# Patient Record
Sex: MALE | Race: WHITE | NOT HISPANIC OR LATINO
[De-identification: names, ages, dates, MRNs, and addresses within clinical notes are randomized per-mention and may not be internally consistent; named-entity substitution may affect disease eponyms.]

---

## 2022-08-10 PROBLEM — Z00.00 ENCOUNTER FOR PREVENTIVE HEALTH EXAMINATION: Status: ACTIVE | Noted: 2022-08-10

## 2022-08-11 ENCOUNTER — NON-APPOINTMENT (OUTPATIENT)
Age: 87
End: 2022-08-11

## 2022-08-29 ENCOUNTER — APPOINTMENT (OUTPATIENT)
Dept: CARDIOLOGY | Facility: CLINIC | Age: 87
End: 2022-08-29

## 2022-08-29 VITALS
WEIGHT: 163 LBS | OXYGEN SATURATION: 97 % | HEART RATE: 76 BPM | BODY MASS INDEX: 26.2 KG/M2 | SYSTOLIC BLOOD PRESSURE: 150 MMHG | DIASTOLIC BLOOD PRESSURE: 70 MMHG | HEIGHT: 66 IN

## 2022-08-29 DIAGNOSIS — R06.02 SHORTNESS OF BREATH: ICD-10-CM

## 2022-08-29 PROCEDURE — 93000 ELECTROCARDIOGRAM COMPLETE: CPT

## 2022-08-29 PROCEDURE — 99204 OFFICE O/P NEW MOD 45 MIN: CPT

## 2022-08-30 NOTE — DISCUSSION/SUMMARY
[EKG obtained to assist in diagnosis and management of assessed problem(s)] : EKG obtained to assist in diagnosis and management of assessed problem(s) [FreeTextEntry1] : EKG performed today revealed NSR, (+) RBBB. \par Elevated BP: BP evaluation today reveals a BP of 150/70 mmHg. At home, his blood pressures range anywhere from 140-150s/80-90s mmHg. Due to the patient's advanced age and mildly elevated BP, permissive BP of 140/50 is acceptable. Recommend salt reduction and dietary restriction. If BPs continue to be elevated, will consider a mild dose of an antihypertensive. \par Continue conservative therapy. \par Instructed to follow up in 6 months. \par Plan discussed with the patient.

## 2022-08-30 NOTE — HISTORY OF PRESENT ILLNESS
[FreeTextEntry1] : TOÑO HORNER is a 89-year-old male who presents today for new patient evaluation. The patient was noted to have elevated BP at his PCP's office and was referred for further evaluation. Denies chest pain or SOB. Otherwise: (-) visual symptoms, (-) focal weakness.\par \par

## 2022-08-31 PROBLEM — R06.02 SHORTNESS OF BREATH ON EXERTION: Status: ACTIVE | Noted: 2022-08-31

## 2022-09-01 ENCOUNTER — APPOINTMENT (OUTPATIENT)
Dept: CARDIOLOGY | Facility: CLINIC | Age: 87
End: 2022-09-01

## 2022-09-01 ENCOUNTER — RX RENEWAL (OUTPATIENT)
Age: 87
End: 2022-09-01

## 2022-09-01 PROCEDURE — 93306 TTE W/DOPPLER COMPLETE: CPT

## 2022-11-20 ENCOUNTER — EMERGENCY (EMERGENCY)
Facility: HOSPITAL | Age: 87
LOS: 0 days | Discharge: HOME | End: 2022-11-20
Attending: EMERGENCY MEDICINE | Admitting: EMERGENCY MEDICINE

## 2022-11-20 VITALS
SYSTOLIC BLOOD PRESSURE: 178 MMHG | WEIGHT: 164.91 LBS | TEMPERATURE: 96 F | DIASTOLIC BLOOD PRESSURE: 84 MMHG | OXYGEN SATURATION: 97 % | RESPIRATION RATE: 17 BRPM | HEART RATE: 77 BPM

## 2022-11-20 DIAGNOSIS — I10 ESSENTIAL (PRIMARY) HYPERTENSION: ICD-10-CM

## 2022-11-20 DIAGNOSIS — R31.9 HEMATURIA, UNSPECIFIED: ICD-10-CM

## 2022-11-20 DIAGNOSIS — K76.89 OTHER SPECIFIED DISEASES OF LIVER: ICD-10-CM

## 2022-11-20 LAB
ALBUMIN SERPL ELPH-MCNC: 4.1 G/DL — SIGNIFICANT CHANGE UP (ref 3.5–5.2)
ALP SERPL-CCNC: 62 U/L — SIGNIFICANT CHANGE UP (ref 30–115)
ALT FLD-CCNC: 11 U/L — SIGNIFICANT CHANGE UP (ref 0–41)
ANION GAP SERPL CALC-SCNC: 6 MMOL/L — LOW (ref 7–14)
APPEARANCE UR: CLEAR — SIGNIFICANT CHANGE UP
AST SERPL-CCNC: 20 U/L — SIGNIFICANT CHANGE UP (ref 0–41)
BACTERIA # UR AUTO: NEGATIVE — SIGNIFICANT CHANGE UP
BASOPHILS # BLD AUTO: 0.05 K/UL — SIGNIFICANT CHANGE UP (ref 0–0.2)
BASOPHILS NFR BLD AUTO: 0.7 % — SIGNIFICANT CHANGE UP (ref 0–1)
BILIRUB SERPL-MCNC: 0.7 MG/DL — SIGNIFICANT CHANGE UP (ref 0.2–1.2)
BILIRUB UR-MCNC: NEGATIVE — SIGNIFICANT CHANGE UP
BUN SERPL-MCNC: 14 MG/DL — SIGNIFICANT CHANGE UP (ref 10–20)
CALCIUM SERPL-MCNC: 8.4 MG/DL — SIGNIFICANT CHANGE UP (ref 8.4–10.5)
CHLORIDE SERPL-SCNC: 108 MMOL/L — SIGNIFICANT CHANGE UP (ref 98–110)
CO2 SERPL-SCNC: 26 MMOL/L — SIGNIFICANT CHANGE UP (ref 17–32)
COLOR SPEC: SIGNIFICANT CHANGE UP
CREAT SERPL-MCNC: 1 MG/DL — SIGNIFICANT CHANGE UP (ref 0.7–1.5)
DIFF PNL FLD: ABNORMAL
EGFR: 72 ML/MIN/1.73M2 — SIGNIFICANT CHANGE UP
EOSINOPHIL # BLD AUTO: 0.14 K/UL — SIGNIFICANT CHANGE UP (ref 0–0.7)
EOSINOPHIL NFR BLD AUTO: 2 % — SIGNIFICANT CHANGE UP (ref 0–8)
EPI CELLS # UR: 0 /HPF — SIGNIFICANT CHANGE UP (ref 0–5)
GLUCOSE SERPL-MCNC: 88 MG/DL — SIGNIFICANT CHANGE UP (ref 70–99)
GLUCOSE UR QL: NEGATIVE — SIGNIFICANT CHANGE UP
HCT VFR BLD CALC: 41.6 % — LOW (ref 42–52)
HGB BLD-MCNC: 13.9 G/DL — LOW (ref 14–18)
HYALINE CASTS # UR AUTO: 0 /LPF — SIGNIFICANT CHANGE UP (ref 0–7)
IMM GRANULOCYTES NFR BLD AUTO: 0.4 % — HIGH (ref 0.1–0.3)
KETONES UR-MCNC: NEGATIVE — SIGNIFICANT CHANGE UP
LEUKOCYTE ESTERASE UR-ACNC: NEGATIVE — SIGNIFICANT CHANGE UP
LYMPHOCYTES # BLD AUTO: 2.23 K/UL — SIGNIFICANT CHANGE UP (ref 1.2–3.4)
LYMPHOCYTES # BLD AUTO: 31.8 % — SIGNIFICANT CHANGE UP (ref 20.5–51.1)
MCHC RBC-ENTMCNC: 31.5 PG — HIGH (ref 27–31)
MCHC RBC-ENTMCNC: 33.4 G/DL — SIGNIFICANT CHANGE UP (ref 32–37)
MCV RBC AUTO: 94.3 FL — HIGH (ref 80–94)
MONOCYTES # BLD AUTO: 0.64 K/UL — HIGH (ref 0.1–0.6)
MONOCYTES NFR BLD AUTO: 9.1 % — SIGNIFICANT CHANGE UP (ref 1.7–9.3)
NEUTROPHILS # BLD AUTO: 3.93 K/UL — SIGNIFICANT CHANGE UP (ref 1.4–6.5)
NEUTROPHILS NFR BLD AUTO: 56 % — SIGNIFICANT CHANGE UP (ref 42.2–75.2)
NITRITE UR-MCNC: NEGATIVE — SIGNIFICANT CHANGE UP
NRBC # BLD: 0 /100 WBCS — SIGNIFICANT CHANGE UP (ref 0–0)
PH UR: 6.5 — SIGNIFICANT CHANGE UP (ref 5–8)
PLATELET # BLD AUTO: 194 K/UL — SIGNIFICANT CHANGE UP (ref 130–400)
POTASSIUM SERPL-MCNC: 4.1 MMOL/L — SIGNIFICANT CHANGE UP (ref 3.5–5)
POTASSIUM SERPL-SCNC: 4.1 MMOL/L — SIGNIFICANT CHANGE UP (ref 3.5–5)
PROT SERPL-MCNC: 6.7 G/DL — SIGNIFICANT CHANGE UP (ref 6–8)
PROT UR-MCNC: SIGNIFICANT CHANGE UP
RBC # BLD: 4.41 M/UL — LOW (ref 4.7–6.1)
RBC # FLD: 13.2 % — SIGNIFICANT CHANGE UP (ref 11.5–14.5)
RBC CASTS # UR COMP ASSIST: 63 /HPF — HIGH (ref 0–4)
SODIUM SERPL-SCNC: 140 MMOL/L — SIGNIFICANT CHANGE UP (ref 135–146)
SP GR SPEC: 1.01 — SIGNIFICANT CHANGE UP (ref 1.01–1.03)
UROBILINOGEN FLD QL: SIGNIFICANT CHANGE UP
WBC # BLD: 7.02 K/UL — SIGNIFICANT CHANGE UP (ref 4.8–10.8)
WBC # FLD AUTO: 7.02 K/UL — SIGNIFICANT CHANGE UP (ref 4.8–10.8)
WBC UR QL: 1 /HPF — SIGNIFICANT CHANGE UP (ref 0–5)

## 2022-11-20 PROCEDURE — 99284 EMERGENCY DEPT VISIT MOD MDM: CPT | Mod: GC

## 2022-11-20 PROCEDURE — 74176 CT ABD & PELVIS W/O CONTRAST: CPT | Mod: 26,MA

## 2022-11-20 RX ORDER — SODIUM CHLORIDE 9 MG/ML
1000 INJECTION INTRAMUSCULAR; INTRAVENOUS; SUBCUTANEOUS ONCE
Refills: 0 | Status: COMPLETED | OUTPATIENT
Start: 2022-11-20 | End: 2022-11-20

## 2022-11-20 RX ADMIN — SODIUM CHLORIDE 1000 MILLILITER(S): 9 INJECTION INTRAMUSCULAR; INTRAVENOUS; SUBCUTANEOUS at 11:41

## 2022-11-20 NOTE — ED PROVIDER NOTE - OBJECTIVE STATEMENT
89 year old male with hypertension presenting with complaints of blood in his urine. Patient reports that he had blood in his urine three times, progressively getting better, since Friday. Denies any pain, fever, changes in urination frequency, or any other symptoms. Son notes that his father was moving heavy furniture before the first episode occurred.

## 2022-11-20 NOTE — ED ADULT NURSE NOTE - NSIMPLEMENTINTERV_GEN_ALL_ED
Implemented All Fall with Harm Risk Interventions:  [place] to call system. Call bell, personal items and telephone within reach. Instruct patient to call for assistance. Room bathroom lighting operational. Non-slip footwear when patient is off stretcher. Physically safe environment: no spills, clutter or unnecessary equipment. Stretcher in lowest position, wheels locked, appropriate side rails in place. Provide visual cue, wrist band, yellow gown, etc. Monitor gait and stability. Monitor for mental status changes and reorient to person, place, and time. Review medications for side effects contributing to fall risk. Reinforce activity limits and safety measures with patient and family. Provide visual clues: red socks.

## 2022-11-20 NOTE — ED PROVIDER NOTE - PHYSICAL EXAMINATION
Gen: Alert, NAD, well appearing  Head: NC, AT  Pulm: Bilateral BS, normal resp effort, no wheeze/stridor/retractions  CV: RRR, no M/R/G, +dist pulses  Abd: soft, NT/ND, +BS, no hepatosplenomegaly  Mskel: no edema/erythema/cyanosis  Skin: no rash, warm/dry  Neuro: AAOx3, no sensory/motor deficits, CN 2-12 intact

## 2022-11-20 NOTE — ED PROVIDER NOTE - NSFOLLOWUPINSTRUCTIONS_ED_ALL_ED_FT
Please follow-up with PCP in 1 to 3 days for hypodense liver mass. Return precautions explained in full to patient/family.                                                                                                 Hematuria, Adult       Hematuria is blood in the urine. Blood may be visible in the urine, or it may be identified with a test. This condition can be caused by infections of the bladder, urethra, kidney, or prostate. Other possible causes include:  •Kidney stones.      •Cancer of the urinary tract.      •Too much calcium in the urine.      •Conditions that are passed from parent to child (inherited conditions).       •Exercise that requires a lot of energy.      Infections can usually be treated with medicine, and a kidney stone usually will pass through your urine. If neither of these is the cause of your hematuria, more tests may be needed to identify the cause of your symptoms.    It is very important to tell your health care provider about any blood in your urine, even if it is painless or the blood stops without treatment. Blood in the urine, when it happens and then stops and then happens again, can be a symptom of a very serious condition, including cancer. There is no pain in the initial stages of many urinary cancers.      Follow these instructions at home:    Medicines     •Take over-the-counter and prescription medicines only as told by your health care provider.      •If you were prescribed an antibiotic medicine, take it as told by your health care provider. Do not stop taking the antibiotic even if you start to feel better.      Eating and drinking     •Drink enough fluid to keep your urine pale yellow. It is recommended that you drink 3–4 quarts (2.8–3.8 L) a day. If you have been diagnosed with an infection, drinking cranberry juice in addition to large amounts of water is recommended.      •Avoid caffeine, tea, and carbonated beverages. These tend to irritate the bladder.      •Avoid alcohol because it may irritate the prostate (in males).      General instructions     •If you have been diagnosed with a kidney stone, follow your health care provider's instructions about straining your urine to catch the stone.      •Empty your bladder often. Avoid holding urine for long periods of time.    •If you are female:  •After a bowel movement, wipe from front to back and use each piece of toilet paper only once.      •Empty your bladder before and after sex.        •Pay attention to any changes in your symptoms. Tell your health care provider about any changes or any new symptoms.      •It is up to you to get the results of any tests. Ask your health care provider, or the department that is doing the test, when your results will be ready.      •Keep all follow-up visits. This is important.        Contact a health care provider if:    •You develop back pain.      •You have a fever or chills.      •You have nausea or vomiting.      •Your symptoms do not improve after 3 days.      •Your symptoms get worse.        Get help right away if:    •You develop severe vomiting and are unable to take medicine without vomiting.      •You develop severe pain in your back or abdomen even though you are taking medicine.      •You pass a large amount of blood in your urine.      •You pass blood clots in your urine.      •You feel very weak or like you might faint.      •You faint.        Summary    •Hematuria is blood in the urine. It has many possible causes.      •It is very important that you tell your health care provider about any blood in your urine, even if it is painless or the blood stops without treatment.      •Take over-the-counter and prescription medicines only as told by your health care provider.      •Drink enough fluid to keep your urine pale yellow.      This information is not intended to replace advice given to you by your health care provider. Make sure you discuss any questions you have with your health care provider.      Document Revised: 08/18/2021 Document Reviewed: 08/18/2021    Elsevier Patient Education © 2022 [employer]

## 2022-11-20 NOTE — ED PROVIDER NOTE - PATIENT PORTAL LINK FT
You can access the FollowMyHealth Patient Portal offered by [facility] by registering at the following website: [email or web address]. By joining [employer]’s FollowMyHealth portal, you will also be able to view your health information using other applications (apps) compatible with our system.

## 2022-11-20 NOTE — ED PROVIDER NOTE - CARE PROVIDER_API CALL
Subhash Zamora)  Urology  [address] [place] [address], [address]  [place] [address]  Phone: [phone]  Fax: [phone]  Follow Up Time: 1-3 Days

## 2022-11-20 NOTE — ED PROVIDER NOTE - PROGRESS NOTE DETAILS
SG: due to pt age and unclear etiology; basic labs, UA, Ucx, Imaging. Will continue to monitor and reassess.

## 2022-11-20 NOTE — ED PROVIDER NOTE - NS ED ROS FT
Constitutional:  No fever, chills, lethargy, or abnormal weight loss  Eyes:  No eye pain or visual changes  ENMT: No nasal discharge, no toothache, no sore throat. No neck pain or stiffness  Cardiac:  No chest pain or palpitations  Respiratory:  No cough or respiratory distress.   GI:  No nausea, vomiting, diarrhea or abdominal pain.  :  see HPI  MS:  No back or joint pain.  Neuro:  No headache. No numbness, weakness, or tingling.   Skin:  No skin rash  Except as documented in the HPI,  all other systems are negative

## 2022-11-20 NOTE — ED PROVIDER NOTE - ATTENDING CONTRIBUTION TO CARE
89-year-old male past medical history hypertension presents with 2 days of hematuria.  Has had intermittent episodes with small clots over the last 2 days.  No dysuria or frequency.  No abdominal pain or flank pain.  No fever nausea vomiting diarrhea.  No chest pain shortness of breath.    On exam, AFVSS, Well appearing, No acute distress, NCAT, EOMI, PERRLA, MMM, Neck supple, LCTAB, RRR nl s1s2 No mrg, Abdomen Soft NTND, no CVA tenderness, AAOx3, No Focal Deficits, No LE edema or calf TTP,    A/P; hematuria, painless, UA negative for UTI, hemoglobin stable, creatinine within normal limits, no acute surgical pathology on CT abdomen pelvis, will DC home with urology rapid follow-up as outpatient within 1 week, strict return precautions provided.  Patient informed of large renal cysts and liver lesion.

## 2022-11-21 LAB
CULTURE RESULTS: SIGNIFICANT CHANGE UP
SPECIMEN SOURCE: SIGNIFICANT CHANGE UP

## 2022-12-05 ENCOUNTER — APPOINTMENT (OUTPATIENT)
Dept: UROLOGY | Facility: CLINIC | Age: 87
End: 2022-12-05

## 2022-12-05 ENCOUNTER — LABORATORY RESULT (OUTPATIENT)
Age: 87
End: 2022-12-05

## 2022-12-05 VITALS
SYSTOLIC BLOOD PRESSURE: 172 MMHG | TEMPERATURE: 97.3 F | WEIGHT: 168 LBS | HEIGHT: 70 IN | DIASTOLIC BLOOD PRESSURE: 83 MMHG | BODY MASS INDEX: 24.05 KG/M2 | HEART RATE: 64 BPM | RESPIRATION RATE: 18 BRPM | OXYGEN SATURATION: 98 %

## 2022-12-05 DIAGNOSIS — R31.0 GROSS HEMATURIA: ICD-10-CM

## 2022-12-05 DIAGNOSIS — Z86.79 PERSONAL HISTORY OF OTHER DISEASES OF THE CIRCULATORY SYSTEM: ICD-10-CM

## 2022-12-05 DIAGNOSIS — N28.1 CYST OF KIDNEY, ACQUIRED: ICD-10-CM

## 2022-12-05 DIAGNOSIS — Z87.891 PERSONAL HISTORY OF NICOTINE DEPENDENCE: ICD-10-CM

## 2022-12-05 PROCEDURE — 99204 OFFICE O/P NEW MOD 45 MIN: CPT

## 2022-12-06 PROBLEM — R31.0 GROSS HEMATURIA: Status: ACTIVE | Noted: 2022-12-05

## 2022-12-06 PROBLEM — N28.1 SIMPLE CYST OF KIDNEY: Status: ACTIVE | Noted: 2022-12-06

## 2022-12-06 NOTE — LETTER BODY
[Dear  ___] : Dear  [unfilled], [Consult Letter:] : I had the pleasure of evaluating your patient, [unfilled]. [Please see my note below.] : Please see my note below. [Sincerely,] : Sincerely, [FreeTextEntry3] : Seema Amos MD, FACS\par

## 2022-12-06 NOTE — ASSESSMENT
[FreeTextEntry1] : 89-year-old with painless gross hematuria.\par \par CT scan was reviewed with patient and family members.  They are aware of large renal cysts, kidney stone, and hepatic lesion.\par Patient will follow-up with medical doctor for hepatic lesion.\par \par Patient is currently asymptomatic and denies any gross hematuria.\par \par Discussed possible causes of gross hematuria with patient and family.\par \par Plan\par -Follow-up for in office cystoscopy\par -UA U culture and U cytology

## 2022-12-06 NOTE — ADDENDUM
[FreeTextEntry1] : Documented by CHERRY Romo acting as a scribe for Dr. Seema Amos \par \par All medical record entries made by the Scribe were at my, Dr. Amos direction and\par personally dictated by me.  I have reviewed the chart and agree that the record\par accurately reflects my personal performance of the history, physical exam, procedure and imaging.  \par  \par \par

## 2022-12-06 NOTE — HISTORY OF PRESENT ILLNESS
[FreeTextEntry1] : Terrell is a 89-year-old male presents for initial consultation for painless gross hematuria.  He was seen in the emergency department on November 20, 2022.\par \par In emergency room patient had CT abdomen and pelvis which revealed no hydroureteronephrosis or CT evidence of obstructing renal calculus.  Patient did have 4 mm nonobstructing right renal calculus.  He had a large bilateral renal cyst measuring up to 15.5 cm at the right upper pole.  Patient also had ill-defined indeterminate right hepatic lobe hypodensity measuring up to at least 1.4 cm.  Distal right ureter and UVJ cannot be evaluated due to obscuring streak artifact secondary to right hip hardware.\par \par Urinalysis done November 20, 2022 was negative for nitrites and negative for leukocytes.  Large blood.  63 RBCs.  1 WBC.  Urine culture grew greater than 3 organisms possible contamination.\par \par CMP done November 20, 2022 revealed an EGFR 72 and a creatinine of 1.0\par \par Currently patient reports feeling well.  He reports gross hematuria has resolved.  He denies any dysuria.  Denies pain.\par \par Patient son reports that patient did have a cystoscopy years ago.  Unknown urologist and unknown indication.

## 2022-12-07 LAB
APPEARANCE: CLEAR
BILIRUBIN URINE: NEGATIVE
BLOOD URINE: NEGATIVE
COLOR: YELLOW
GLUCOSE QUALITATIVE U: NEGATIVE
KETONES URINE: NEGATIVE
LEUKOCYTE ESTERASE URINE: NEGATIVE
NITRITE URINE: NEGATIVE
PH URINE: 6
PROTEIN URINE: ABNORMAL
SPECIFIC GRAVITY URINE: 1.03
UROBILINOGEN URINE: NORMAL

## 2022-12-08 LAB — BACTERIA UR CULT: NORMAL

## 2022-12-12 LAB — URINE CYTOLOGY: NORMAL

## 2023-01-12 ENCOUNTER — APPOINTMENT (OUTPATIENT)
Dept: UROLOGY | Facility: CLINIC | Age: 88
End: 2023-01-12

## 2023-02-24 ENCOUNTER — RX RENEWAL (OUTPATIENT)
Age: 88
End: 2023-02-24

## 2023-02-27 ENCOUNTER — APPOINTMENT (OUTPATIENT)
Dept: CARDIOLOGY | Facility: CLINIC | Age: 88
End: 2023-02-27
Payer: MEDICARE

## 2023-02-27 VITALS
WEIGHT: 167 LBS | OXYGEN SATURATION: 96 % | HEIGHT: 66 IN | RESPIRATION RATE: 16 BRPM | DIASTOLIC BLOOD PRESSURE: 76 MMHG | SYSTOLIC BLOOD PRESSURE: 155 MMHG | HEART RATE: 60 BPM | BODY MASS INDEX: 26.84 KG/M2

## 2023-02-27 PROCEDURE — 99213 OFFICE O/P EST LOW 20 MIN: CPT

## 2023-02-28 NOTE — DISCUSSION/SUMMARY
[FreeTextEntry1] : HTN: The impression is hypertension. Due to advanced age, recommend conservative management. There are no changes in medication management. Continue Coreg 3.125 mg BID. Other planned treatments include an exercise regimen, weight loss, low sodium diet, and alcohol moderation.\par \par Plan was discussed with the patient.\par \par

## 2023-02-28 NOTE — HISTORY OF PRESENT ILLNESS
[FreeTextEntry1] : TOÑO HORNER is a 89-year-old male, with a PMHx significant for HTN, who presents today for a follow-up visit. BP today measures 155/76 mmHg. Patient is currently on Coreg 3.125 mg BID. Denies any other complaints. States he feels well. EF was 45-50% on last echocardiogram performed in 9/2022. Otherwise: (-) chest pain, (-) SOB.\par

## 2023-08-28 ENCOUNTER — APPOINTMENT (OUTPATIENT)
Dept: CARDIOLOGY | Facility: CLINIC | Age: 88
End: 2023-08-28

## 2023-12-11 ENCOUNTER — APPOINTMENT (OUTPATIENT)
Dept: CARDIOLOGY | Facility: CLINIC | Age: 88
End: 2023-12-11
Payer: MEDICARE

## 2023-12-11 VITALS
RESPIRATION RATE: 16 BRPM | SYSTOLIC BLOOD PRESSURE: 136 MMHG | HEART RATE: 60 BPM | OXYGEN SATURATION: 97 % | BODY MASS INDEX: 27.32 KG/M2 | DIASTOLIC BLOOD PRESSURE: 80 MMHG | HEIGHT: 66 IN | WEIGHT: 170 LBS

## 2023-12-11 PROCEDURE — 99214 OFFICE O/P EST MOD 30 MIN: CPT

## 2023-12-11 PROCEDURE — 93000 ELECTROCARDIOGRAM COMPLETE: CPT

## 2024-02-21 ENCOUNTER — RX RENEWAL (OUTPATIENT)
Age: 89
End: 2024-02-21

## 2024-02-26 RX ORDER — CARVEDILOL 3.12 MG/1
3.12 TABLET, FILM COATED ORAL
Qty: 180 | Refills: 3 | Status: ACTIVE | COMMUNITY
Start: 2022-09-01 | End: 1900-01-01

## 2024-06-10 ENCOUNTER — APPOINTMENT (OUTPATIENT)
Dept: CARDIOLOGY | Facility: CLINIC | Age: 89
End: 2024-06-10
Payer: MEDICARE

## 2024-06-10 VITALS
HEIGHT: 66 IN | OXYGEN SATURATION: 97 % | DIASTOLIC BLOOD PRESSURE: 80 MMHG | SYSTOLIC BLOOD PRESSURE: 140 MMHG | HEART RATE: 72 BPM | BODY MASS INDEX: 27.16 KG/M2 | WEIGHT: 169 LBS

## 2024-06-10 DIAGNOSIS — I10 ESSENTIAL (PRIMARY) HYPERTENSION: ICD-10-CM

## 2024-06-10 DIAGNOSIS — E66.3 OVERWEIGHT: ICD-10-CM

## 2024-06-10 DIAGNOSIS — I50.9 HEART FAILURE, UNSPECIFIED: ICD-10-CM

## 2024-06-10 PROCEDURE — 99213 OFFICE O/P EST LOW 20 MIN: CPT

## 2024-06-10 PROCEDURE — 93000 ELECTROCARDIOGRAM COMPLETE: CPT

## 2024-06-13 NOTE — REVIEW OF SYSTEMS
[Negative] : Heme/Lymph [Numbness (Hypoesthesia)] : no numbness [Tingling (Paresthesia)] : no tingling [Weakness] : no weakness [Speech Disturbance] : no speech disturbance [FreeTextEntry2] : (+) Dizziness

## 2024-06-13 NOTE — CARDIOLOGY SUMMARY
[de-identified] : 12/11/2023: SR, (+) PVC.  ======================================================== [de-identified] : TTE - 09/01/2022: SUMMARY 1. Left ventricular calculated ejection fraction is estimated at 45 to 50%. 2. Normal right ventricular size and systolic function. 3. The left atrium is normal in size. 4. The right atrium is normal in size. 5. No evidence of a patent foramen ovale by color flow Doppler. 6. Interatrial septum is aneurysmal. 7. Mild aortic regurgitation. 8. Normal mitral valve structure and function. No mitral stenosis or significant regurgitation. =======================================================

## 2024-06-13 NOTE — DISCUSSION/SUMMARY
[EKG obtained to assist in diagnosis and management of assessed problem(s)] : EKG obtained to assist in diagnosis and management of assessed problem(s) [FreeTextEntry1] : EKG performed today revealed SR, (+) PVC.  HTN: The impression is hypertension. Currently, the condition is controlled. There are no changes in medication management. Continue current medical therapy. Other planned treatments include an exercise regimen, weight loss, low sodium diet, and alcohol moderation.  Overweight: Discussed risks of being overweight with the patient. Counselled on weight loss with dietary modification and increased physical activity/exercise.  Instructed to follow up in 6 months.  Plan was discussed with the patient.

## 2024-06-13 NOTE — HISTORY OF PRESENT ILLNESS
[FreeTextEntry1] : TOÑO HORNER is a 89-year-old male, with a PMHx significant for HTN, who presents today for a follow-up visit. Denies any new complaints. Reports he is feeling well. Otherwise: (-) chest pain, (-) SOB.

## 2024-06-28 ENCOUNTER — APPOINTMENT (OUTPATIENT)
Dept: ORTHOPEDIC SURGERY | Facility: CLINIC | Age: 89
End: 2024-06-28
Payer: MEDICARE

## 2024-06-28 DIAGNOSIS — M17.0 BILATERAL PRIMARY OSTEOARTHRITIS OF KNEE: ICD-10-CM

## 2024-06-28 PROCEDURE — 99203 OFFICE O/P NEW LOW 30 MIN: CPT

## 2024-06-28 PROCEDURE — 73562 X-RAY EXAM OF KNEE 3: CPT | Mod: 50

## 2024-11-20 NOTE — ED ADULT NURSE NOTE - NSFALLRSKHARMRISK_ED_ALL_ED
"Please see \"Imaging\" tab under \"Chart Review\" for details of today's visit.    Darryl Navarrete      " Normal vision: sees adequately in most situations; can see medication labels, newsprint yes

## 2024-12-09 ENCOUNTER — APPOINTMENT (OUTPATIENT)
Dept: CARDIOLOGY | Facility: CLINIC | Age: 88
End: 2024-12-09
Payer: MEDICARE

## 2024-12-09 ENCOUNTER — NON-APPOINTMENT (OUTPATIENT)
Age: 88
End: 2024-12-09

## 2024-12-09 VITALS
HEART RATE: 82 BPM | WEIGHT: 165 LBS | BODY MASS INDEX: 26.52 KG/M2 | DIASTOLIC BLOOD PRESSURE: 90 MMHG | OXYGEN SATURATION: 97 % | SYSTOLIC BLOOD PRESSURE: 160 MMHG | HEIGHT: 66 IN

## 2024-12-09 DIAGNOSIS — E66.3 OVERWEIGHT: ICD-10-CM

## 2024-12-09 DIAGNOSIS — I10 ESSENTIAL (PRIMARY) HYPERTENSION: ICD-10-CM

## 2024-12-09 DIAGNOSIS — I50.9 HEART FAILURE, UNSPECIFIED: ICD-10-CM

## 2024-12-09 PROCEDURE — 99214 OFFICE O/P EST MOD 30 MIN: CPT

## 2024-12-09 RX ORDER — NIFEDIPINE 30 MG/1
30 TABLET, EXTENDED RELEASE ORAL
Qty: 90 | Refills: 0 | Status: ACTIVE | COMMUNITY
Start: 2024-12-09 | End: 1900-01-01

## 2025-01-01 ENCOUNTER — INPATIENT (INPATIENT)
Facility: HOSPITAL | Age: 89
LOS: 23 days | DRG: 948 | End: 2025-05-12
Attending: STUDENT IN AN ORGANIZED HEALTH CARE EDUCATION/TRAINING PROGRAM | Admitting: INTERNAL MEDICINE
Payer: MEDICARE

## 2025-01-01 VITALS
OXYGEN SATURATION: 96 % | TEMPERATURE: 98 F | SYSTOLIC BLOOD PRESSURE: 125 MMHG | HEART RATE: 101 BPM | RESPIRATION RATE: 18 BRPM | DIASTOLIC BLOOD PRESSURE: 73 MMHG

## 2025-01-01 VITALS — RESPIRATION RATE: 22 BRPM | OXYGEN SATURATION: 93 %

## 2025-01-01 DIAGNOSIS — R31.9 HEMATURIA, UNSPECIFIED: ICD-10-CM

## 2025-01-01 DIAGNOSIS — R41.0 DISORIENTATION, UNSPECIFIED: ICD-10-CM

## 2025-01-01 DIAGNOSIS — I50.9 HEART FAILURE, UNSPECIFIED: ICD-10-CM

## 2025-01-01 DIAGNOSIS — Z71.89 OTHER SPECIFIED COUNSELING: ICD-10-CM

## 2025-01-01 DIAGNOSIS — I26.99 OTHER PULMONARY EMBOLISM WITHOUT ACUTE COR PULMONALE: ICD-10-CM

## 2025-01-01 DIAGNOSIS — Z51.5 ENCOUNTER FOR PALLIATIVE CARE: ICD-10-CM

## 2025-01-01 DIAGNOSIS — J96.01 ACUTE RESPIRATORY FAILURE WITH HYPOXIA: ICD-10-CM

## 2025-01-01 LAB
ALBUMIN SERPL ELPH-MCNC: 3 G/DL — LOW (ref 3.5–5.2)
ALBUMIN SERPL ELPH-MCNC: 3.2 G/DL — LOW (ref 3.5–5.2)
ALBUMIN SERPL ELPH-MCNC: 3.3 G/DL — LOW (ref 3.5–5.2)
ALBUMIN SERPL ELPH-MCNC: 3.4 G/DL — LOW (ref 3.5–5.2)
ALBUMIN SERPL ELPH-MCNC: 3.5 G/DL — SIGNIFICANT CHANGE UP (ref 3.5–5.2)
ALBUMIN SERPL ELPH-MCNC: 3.6 G/DL — SIGNIFICANT CHANGE UP (ref 3.5–5.2)
ALBUMIN SERPL ELPH-MCNC: 3.6 G/DL — SIGNIFICANT CHANGE UP (ref 3.5–5.2)
ALBUMIN SERPL ELPH-MCNC: 3.7 G/DL — SIGNIFICANT CHANGE UP (ref 3.5–5.2)
ALBUMIN SERPL ELPH-MCNC: 3.8 G/DL — SIGNIFICANT CHANGE UP (ref 3.5–5.2)
ALP SERPL-CCNC: 75 U/L — SIGNIFICANT CHANGE UP (ref 30–115)
ALP SERPL-CCNC: 77 U/L — SIGNIFICANT CHANGE UP (ref 30–115)
ALP SERPL-CCNC: 79 U/L — SIGNIFICANT CHANGE UP (ref 30–115)
ALP SERPL-CCNC: 80 U/L — SIGNIFICANT CHANGE UP (ref 30–115)
ALP SERPL-CCNC: 84 U/L — SIGNIFICANT CHANGE UP (ref 30–115)
ALP SERPL-CCNC: 84 U/L — SIGNIFICANT CHANGE UP (ref 30–115)
ALP SERPL-CCNC: 85 U/L — SIGNIFICANT CHANGE UP (ref 30–115)
ALP SERPL-CCNC: 86 U/L — SIGNIFICANT CHANGE UP (ref 30–115)
ALP SERPL-CCNC: 92 U/L — SIGNIFICANT CHANGE UP (ref 30–115)
ALP SERPL-CCNC: 93 U/L — SIGNIFICANT CHANGE UP (ref 30–115)
ALP SERPL-CCNC: 93 U/L — SIGNIFICANT CHANGE UP (ref 30–115)
ALP SERPL-CCNC: 99 U/L — SIGNIFICANT CHANGE UP (ref 30–115)
ALT FLD-CCNC: 10 U/L — SIGNIFICANT CHANGE UP (ref 0–41)
ALT FLD-CCNC: 13 U/L — SIGNIFICANT CHANGE UP (ref 0–41)
ALT FLD-CCNC: 15 U/L — SIGNIFICANT CHANGE UP (ref 0–41)
ALT FLD-CCNC: 16 U/L — SIGNIFICANT CHANGE UP (ref 0–41)
ALT FLD-CCNC: 17 U/L — SIGNIFICANT CHANGE UP (ref 0–41)
ALT FLD-CCNC: 17 U/L — SIGNIFICANT CHANGE UP (ref 0–41)
ALT FLD-CCNC: 24 U/L — SIGNIFICANT CHANGE UP (ref 0–41)
ALT FLD-CCNC: 25 U/L — SIGNIFICANT CHANGE UP (ref 0–41)
ALT FLD-CCNC: 30 U/L — SIGNIFICANT CHANGE UP (ref 0–41)
ALT FLD-CCNC: 34 U/L — SIGNIFICANT CHANGE UP (ref 0–41)
ALT FLD-CCNC: 39 U/L — SIGNIFICANT CHANGE UP (ref 0–41)
ALT FLD-CCNC: 44 U/L — HIGH (ref 0–41)
ALT FLD-CCNC: 48 U/L — HIGH (ref 0–41)
ALT FLD-CCNC: 51 U/L — HIGH (ref 0–41)
ALT FLD-CCNC: 61 U/L — HIGH (ref 0–41)
ALT FLD-CCNC: 63 U/L — HIGH (ref 0–41)
ANION GAP SERPL CALC-SCNC: 11 MMOL/L — SIGNIFICANT CHANGE UP (ref 7–14)
ANION GAP SERPL CALC-SCNC: 11 MMOL/L — SIGNIFICANT CHANGE UP (ref 7–14)
ANION GAP SERPL CALC-SCNC: 12 MMOL/L — SIGNIFICANT CHANGE UP (ref 7–14)
ANION GAP SERPL CALC-SCNC: 13 MMOL/L — SIGNIFICANT CHANGE UP (ref 7–14)
ANION GAP SERPL CALC-SCNC: 14 MMOL/L — SIGNIFICANT CHANGE UP (ref 7–14)
ANION GAP SERPL CALC-SCNC: 15 MMOL/L — HIGH (ref 7–14)
ANION GAP SERPL CALC-SCNC: 16 MMOL/L — HIGH (ref 7–14)
ANION GAP SERPL CALC-SCNC: 16 MMOL/L — HIGH (ref 7–14)
ANION GAP SERPL CALC-SCNC: 17 MMOL/L — HIGH (ref 7–14)
ANION GAP SERPL CALC-SCNC: 8 MMOL/L — SIGNIFICANT CHANGE UP (ref 7–14)
APPEARANCE UR: ABNORMAL
APTT BLD: 26.7 SEC — LOW (ref 27–39.2)
AST SERPL-CCNC: 18 U/L — SIGNIFICANT CHANGE UP (ref 0–41)
AST SERPL-CCNC: 23 U/L — SIGNIFICANT CHANGE UP (ref 0–41)
AST SERPL-CCNC: 24 U/L — SIGNIFICANT CHANGE UP (ref 0–41)
AST SERPL-CCNC: 26 U/L — SIGNIFICANT CHANGE UP (ref 0–41)
AST SERPL-CCNC: 26 U/L — SIGNIFICANT CHANGE UP (ref 0–41)
AST SERPL-CCNC: 28 U/L — SIGNIFICANT CHANGE UP (ref 0–41)
AST SERPL-CCNC: 31 U/L — SIGNIFICANT CHANGE UP (ref 0–41)
AST SERPL-CCNC: 37 U/L — SIGNIFICANT CHANGE UP (ref 0–41)
AST SERPL-CCNC: 40 U/L — SIGNIFICANT CHANGE UP (ref 0–41)
AST SERPL-CCNC: 41 U/L — SIGNIFICANT CHANGE UP (ref 0–41)
AST SERPL-CCNC: 43 U/L — HIGH (ref 0–41)
AST SERPL-CCNC: 45 U/L — HIGH (ref 0–41)
AST SERPL-CCNC: 50 U/L — HIGH (ref 0–41)
AST SERPL-CCNC: 67 U/L — HIGH (ref 0–41)
AST SERPL-CCNC: 79 U/L — HIGH (ref 0–41)
AST SERPL-CCNC: 85 U/L — HIGH (ref 0–41)
BASE EXCESS BLDA CALC-SCNC: -0.3 MMOL/L — SIGNIFICANT CHANGE UP (ref -2–3)
BASE EXCESS BLDA CALC-SCNC: 0.3 MMOL/L — SIGNIFICANT CHANGE UP (ref -2–3)
BASE EXCESS BLDA CALC-SCNC: 2.4 MMOL/L — SIGNIFICANT CHANGE UP (ref -2–3)
BASOPHILS # BLD AUTO: 0.03 K/UL — SIGNIFICANT CHANGE UP (ref 0–0.2)
BASOPHILS # BLD AUTO: 0.04 K/UL — SIGNIFICANT CHANGE UP (ref 0–0.2)
BASOPHILS # BLD AUTO: 0.05 K/UL — SIGNIFICANT CHANGE UP (ref 0–0.2)
BASOPHILS # BLD AUTO: 0.06 K/UL — SIGNIFICANT CHANGE UP (ref 0–0.2)
BASOPHILS # BLD AUTO: 0.08 K/UL — SIGNIFICANT CHANGE UP (ref 0–0.2)
BASOPHILS NFR BLD AUTO: 0.3 % — SIGNIFICANT CHANGE UP (ref 0–1)
BASOPHILS NFR BLD AUTO: 0.4 % — SIGNIFICANT CHANGE UP (ref 0–1)
BASOPHILS NFR BLD AUTO: 0.4 % — SIGNIFICANT CHANGE UP (ref 0–1)
BASOPHILS NFR BLD AUTO: 0.5 % — SIGNIFICANT CHANGE UP (ref 0–1)
BASOPHILS NFR BLD AUTO: 0.6 % — SIGNIFICANT CHANGE UP (ref 0–1)
BASOPHILS NFR BLD AUTO: 0.7 % — SIGNIFICANT CHANGE UP (ref 0–1)
BASOPHILS NFR BLD AUTO: 0.8 % — SIGNIFICANT CHANGE UP (ref 0–1)
BASOPHILS NFR BLD AUTO: 0.9 % — SIGNIFICANT CHANGE UP (ref 0–1)
BASOPHILS NFR BLD AUTO: 1 % — SIGNIFICANT CHANGE UP (ref 0–1)
BASOPHILS NFR BLD AUTO: 1.1 % — HIGH (ref 0–1)
BILIRUB SERPL-MCNC: 0.6 MG/DL — SIGNIFICANT CHANGE UP (ref 0.2–1.2)
BILIRUB SERPL-MCNC: 0.6 MG/DL — SIGNIFICANT CHANGE UP (ref 0.2–1.2)
BILIRUB SERPL-MCNC: 0.7 MG/DL — SIGNIFICANT CHANGE UP (ref 0.2–1.2)
BILIRUB SERPL-MCNC: 0.7 MG/DL — SIGNIFICANT CHANGE UP (ref 0.2–1.2)
BILIRUB SERPL-MCNC: 0.8 MG/DL — SIGNIFICANT CHANGE UP (ref 0.2–1.2)
BILIRUB SERPL-MCNC: 0.8 MG/DL — SIGNIFICANT CHANGE UP (ref 0.2–1.2)
BILIRUB SERPL-MCNC: 0.9 MG/DL — SIGNIFICANT CHANGE UP (ref 0.2–1.2)
BILIRUB SERPL-MCNC: 1 MG/DL — SIGNIFICANT CHANGE UP (ref 0.2–1.2)
BILIRUB SERPL-MCNC: 1.1 MG/DL — SIGNIFICANT CHANGE UP (ref 0.2–1.2)
BILIRUB SERPL-MCNC: 1.2 MG/DL — SIGNIFICANT CHANGE UP (ref 0.2–1.2)
BILIRUB SERPL-MCNC: 1.5 MG/DL — HIGH (ref 0.2–1.2)
BILIRUB SERPL-MCNC: 1.5 MG/DL — HIGH (ref 0.2–1.2)
BILIRUB UR-MCNC: NEGATIVE — SIGNIFICANT CHANGE UP
BUN SERPL-MCNC: 10 MG/DL — SIGNIFICANT CHANGE UP (ref 10–20)
BUN SERPL-MCNC: 11 MG/DL — SIGNIFICANT CHANGE UP (ref 10–20)
BUN SERPL-MCNC: 11 MG/DL — SIGNIFICANT CHANGE UP (ref 10–20)
BUN SERPL-MCNC: 12 MG/DL — SIGNIFICANT CHANGE UP (ref 10–20)
BUN SERPL-MCNC: 13 MG/DL — SIGNIFICANT CHANGE UP (ref 10–20)
BUN SERPL-MCNC: 15 MG/DL — SIGNIFICANT CHANGE UP (ref 10–20)
BUN SERPL-MCNC: 15 MG/DL — SIGNIFICANT CHANGE UP (ref 10–20)
BUN SERPL-MCNC: 16 MG/DL — SIGNIFICANT CHANGE UP (ref 10–20)
BUN SERPL-MCNC: 16 MG/DL — SIGNIFICANT CHANGE UP (ref 10–20)
BUN SERPL-MCNC: 17 MG/DL — SIGNIFICANT CHANGE UP (ref 10–20)
BUN SERPL-MCNC: 17 MG/DL — SIGNIFICANT CHANGE UP (ref 10–20)
BUN SERPL-MCNC: 19 MG/DL — SIGNIFICANT CHANGE UP (ref 10–20)
BUN SERPL-MCNC: 22 MG/DL — HIGH (ref 10–20)
BUN SERPL-MCNC: 9 MG/DL — LOW (ref 10–20)
CALCIUM SERPL-MCNC: 8.4 MG/DL — SIGNIFICANT CHANGE UP (ref 8.4–10.5)
CALCIUM SERPL-MCNC: 8.5 MG/DL — SIGNIFICANT CHANGE UP (ref 8.4–10.5)
CALCIUM SERPL-MCNC: 8.5 MG/DL — SIGNIFICANT CHANGE UP (ref 8.4–10.5)
CALCIUM SERPL-MCNC: 8.6 MG/DL — SIGNIFICANT CHANGE UP (ref 8.4–10.5)
CALCIUM SERPL-MCNC: 8.6 MG/DL — SIGNIFICANT CHANGE UP (ref 8.4–10.5)
CALCIUM SERPL-MCNC: 8.7 MG/DL — SIGNIFICANT CHANGE UP (ref 8.4–10.5)
CALCIUM SERPL-MCNC: 8.7 MG/DL — SIGNIFICANT CHANGE UP (ref 8.4–10.5)
CALCIUM SERPL-MCNC: 8.8 MG/DL — SIGNIFICANT CHANGE UP (ref 8.4–10.5)
CALCIUM SERPL-MCNC: 8.9 MG/DL — SIGNIFICANT CHANGE UP (ref 8.4–10.5)
CALCIUM SERPL-MCNC: 9 MG/DL — SIGNIFICANT CHANGE UP (ref 8.4–10.5)
CALCIUM SERPL-MCNC: 9.2 MG/DL — SIGNIFICANT CHANGE UP (ref 8.4–10.5)
CALCIUM SERPL-MCNC: 9.3 MG/DL — SIGNIFICANT CHANGE UP (ref 8.4–10.5)
CHLORIDE SERPL-SCNC: 104 MMOL/L — SIGNIFICANT CHANGE UP (ref 98–110)
CHLORIDE SERPL-SCNC: 105 MMOL/L — SIGNIFICANT CHANGE UP (ref 98–110)
CHLORIDE SERPL-SCNC: 105 MMOL/L — SIGNIFICANT CHANGE UP (ref 98–110)
CHLORIDE SERPL-SCNC: 106 MMOL/L — SIGNIFICANT CHANGE UP (ref 98–110)
CHLORIDE SERPL-SCNC: 107 MMOL/L — SIGNIFICANT CHANGE UP (ref 98–110)
CHLORIDE SERPL-SCNC: 108 MMOL/L — SIGNIFICANT CHANGE UP (ref 98–110)
CHLORIDE SERPL-SCNC: 109 MMOL/L — SIGNIFICANT CHANGE UP (ref 98–110)
CHLORIDE SERPL-SCNC: 109 MMOL/L — SIGNIFICANT CHANGE UP (ref 98–110)
CHLORIDE SERPL-SCNC: 110 MMOL/L — SIGNIFICANT CHANGE UP (ref 98–110)
CHLORIDE SERPL-SCNC: 112 MMOL/L — HIGH (ref 98–110)
CO2 SERPL-SCNC: 19 MMOL/L — SIGNIFICANT CHANGE UP (ref 17–32)
CO2 SERPL-SCNC: 20 MMOL/L — SIGNIFICANT CHANGE UP (ref 17–32)
CO2 SERPL-SCNC: 21 MMOL/L — SIGNIFICANT CHANGE UP (ref 17–32)
CO2 SERPL-SCNC: 22 MMOL/L — SIGNIFICANT CHANGE UP (ref 17–32)
CO2 SERPL-SCNC: 26 MMOL/L — SIGNIFICANT CHANGE UP (ref 17–32)
COLOR SPEC: ABNORMAL
CREAT SERPL-MCNC: 0.8 MG/DL — SIGNIFICANT CHANGE UP (ref 0.7–1.5)
CREAT SERPL-MCNC: 0.9 MG/DL — SIGNIFICANT CHANGE UP (ref 0.7–1.5)
CREAT SERPL-MCNC: 1 MG/DL — SIGNIFICANT CHANGE UP (ref 0.7–1.5)
CREAT SERPL-MCNC: 1.1 MG/DL — SIGNIFICANT CHANGE UP (ref 0.7–1.5)
CREAT SERPL-MCNC: 1.2 MG/DL — SIGNIFICANT CHANGE UP (ref 0.7–1.5)
D DIMER BLD IA.RAPID-MCNC: 1012 NG/ML DDU — HIGH
DIFF PNL FLD: ABNORMAL
EGFR: 57 ML/MIN/1.73M2 — LOW
EGFR: 57 ML/MIN/1.73M2 — LOW
EGFR: 63 ML/MIN/1.73M2 — SIGNIFICANT CHANGE UP
EGFR: 71 ML/MIN/1.73M2 — SIGNIFICANT CHANGE UP
EGFR: 80 ML/MIN/1.73M2 — SIGNIFICANT CHANGE UP
EGFR: 83 ML/MIN/1.73M2 — SIGNIFICANT CHANGE UP
EGFR: 83 ML/MIN/1.73M2 — SIGNIFICANT CHANGE UP
EOSINOPHIL # BLD AUTO: 0.01 K/UL — SIGNIFICANT CHANGE UP (ref 0–0.7)
EOSINOPHIL # BLD AUTO: 0.04 K/UL — SIGNIFICANT CHANGE UP (ref 0–0.7)
EOSINOPHIL # BLD AUTO: 0.09 K/UL — SIGNIFICANT CHANGE UP (ref 0–0.7)
EOSINOPHIL # BLD AUTO: 0.12 K/UL — SIGNIFICANT CHANGE UP (ref 0–0.7)
EOSINOPHIL # BLD AUTO: 0.13 K/UL — SIGNIFICANT CHANGE UP (ref 0–0.7)
EOSINOPHIL # BLD AUTO: 0.24 K/UL — SIGNIFICANT CHANGE UP (ref 0–0.7)
EOSINOPHIL # BLD AUTO: 0.26 K/UL — SIGNIFICANT CHANGE UP (ref 0–0.7)
EOSINOPHIL # BLD AUTO: 0.28 K/UL — SIGNIFICANT CHANGE UP (ref 0–0.7)
EOSINOPHIL # BLD AUTO: 0.29 K/UL — SIGNIFICANT CHANGE UP (ref 0–0.7)
EOSINOPHIL # BLD AUTO: 0.31 K/UL — SIGNIFICANT CHANGE UP (ref 0–0.7)
EOSINOPHIL # BLD AUTO: 0.31 K/UL — SIGNIFICANT CHANGE UP (ref 0–0.7)
EOSINOPHIL # BLD AUTO: 0.34 K/UL — SIGNIFICANT CHANGE UP (ref 0–0.7)
EOSINOPHIL # BLD AUTO: 0.35 K/UL — SIGNIFICANT CHANGE UP (ref 0–0.7)
EOSINOPHIL # BLD AUTO: 0.39 K/UL — SIGNIFICANT CHANGE UP (ref 0–0.7)
EOSINOPHIL # BLD AUTO: 0.42 K/UL — SIGNIFICANT CHANGE UP (ref 0–0.7)
EOSINOPHIL # BLD AUTO: 0.53 K/UL — SIGNIFICANT CHANGE UP (ref 0–0.7)
EOSINOPHIL NFR BLD AUTO: 0.1 % — SIGNIFICANT CHANGE UP (ref 0–8)
EOSINOPHIL NFR BLD AUTO: 0.3 % — SIGNIFICANT CHANGE UP (ref 0–8)
EOSINOPHIL NFR BLD AUTO: 0.8 % — SIGNIFICANT CHANGE UP (ref 0–8)
EOSINOPHIL NFR BLD AUTO: 1 % — SIGNIFICANT CHANGE UP (ref 0–8)
EOSINOPHIL NFR BLD AUTO: 2.1 % — SIGNIFICANT CHANGE UP (ref 0–8)
EOSINOPHIL NFR BLD AUTO: 2.5 % — SIGNIFICANT CHANGE UP (ref 0–8)
EOSINOPHIL NFR BLD AUTO: 2.6 % — SIGNIFICANT CHANGE UP (ref 0–8)
EOSINOPHIL NFR BLD AUTO: 3.1 % — SIGNIFICANT CHANGE UP (ref 0–8)
EOSINOPHIL NFR BLD AUTO: 3.8 % — SIGNIFICANT CHANGE UP (ref 0–8)
EOSINOPHIL NFR BLD AUTO: 3.8 % — SIGNIFICANT CHANGE UP (ref 0–8)
EOSINOPHIL NFR BLD AUTO: 4.8 % — SIGNIFICANT CHANGE UP (ref 0–8)
EOSINOPHIL NFR BLD AUTO: 5 % — SIGNIFICANT CHANGE UP (ref 0–8)
EOSINOPHIL NFR BLD AUTO: 5.1 % — SIGNIFICANT CHANGE UP (ref 0–8)
EOSINOPHIL NFR BLD AUTO: 5.4 % — SIGNIFICANT CHANGE UP (ref 0–8)
EOSINOPHIL NFR BLD AUTO: 5.7 % — SIGNIFICANT CHANGE UP (ref 0–8)
EOSINOPHIL NFR BLD AUTO: 6.6 % — SIGNIFICANT CHANGE UP (ref 0–8)
ETHANOL SERPL-MCNC: <10 MG/DL — SIGNIFICANT CHANGE UP
GLUCOSE SERPL-MCNC: 102 MG/DL — HIGH (ref 70–99)
GLUCOSE SERPL-MCNC: 103 MG/DL — HIGH (ref 70–99)
GLUCOSE SERPL-MCNC: 105 MG/DL — HIGH (ref 70–99)
GLUCOSE SERPL-MCNC: 108 MG/DL — HIGH (ref 70–99)
GLUCOSE SERPL-MCNC: 74 MG/DL — SIGNIFICANT CHANGE UP (ref 70–99)
GLUCOSE SERPL-MCNC: 75 MG/DL — SIGNIFICANT CHANGE UP (ref 70–99)
GLUCOSE SERPL-MCNC: 79 MG/DL — SIGNIFICANT CHANGE UP (ref 70–99)
GLUCOSE SERPL-MCNC: 81 MG/DL — SIGNIFICANT CHANGE UP (ref 70–99)
GLUCOSE SERPL-MCNC: 81 MG/DL — SIGNIFICANT CHANGE UP (ref 70–99)
GLUCOSE SERPL-MCNC: 82 MG/DL — SIGNIFICANT CHANGE UP (ref 70–99)
GLUCOSE SERPL-MCNC: 84 MG/DL — SIGNIFICANT CHANGE UP (ref 70–99)
GLUCOSE SERPL-MCNC: 84 MG/DL — SIGNIFICANT CHANGE UP (ref 70–99)
GLUCOSE SERPL-MCNC: 87 MG/DL — SIGNIFICANT CHANGE UP (ref 70–99)
GLUCOSE SERPL-MCNC: 91 MG/DL — SIGNIFICANT CHANGE UP (ref 70–99)
GLUCOSE SERPL-MCNC: 95 MG/DL — SIGNIFICANT CHANGE UP (ref 70–99)
GLUCOSE SERPL-MCNC: 97 MG/DL — SIGNIFICANT CHANGE UP (ref 70–99)
GLUCOSE SERPL-MCNC: 99 MG/DL — SIGNIFICANT CHANGE UP (ref 70–99)
GLUCOSE UR QL: NEGATIVE MG/DL — SIGNIFICANT CHANGE UP
HCO3 BLDA-SCNC: 21 MMOL/L — SIGNIFICANT CHANGE UP (ref 21–28)
HCO3 BLDA-SCNC: 21 MMOL/L — SIGNIFICANT CHANGE UP (ref 21–28)
HCO3 BLDA-SCNC: 24 MMOL/L — SIGNIFICANT CHANGE UP (ref 21–28)
HCT VFR BLD CALC: 38 % — LOW (ref 42–52)
HCT VFR BLD CALC: 38.3 % — LOW (ref 42–52)
HCT VFR BLD CALC: 38.6 % — LOW (ref 42–52)
HCT VFR BLD CALC: 39 % — LOW (ref 42–52)
HCT VFR BLD CALC: 39.8 % — LOW (ref 42–52)
HCT VFR BLD CALC: 39.9 % — LOW (ref 42–52)
HCT VFR BLD CALC: 40.5 % — LOW (ref 42–52)
HCT VFR BLD CALC: 40.6 % — LOW (ref 42–52)
HCT VFR BLD CALC: 40.9 % — LOW (ref 42–52)
HCT VFR BLD CALC: 40.9 % — LOW (ref 42–52)
HCT VFR BLD CALC: 41 % — LOW (ref 42–52)
HCT VFR BLD CALC: 41.8 % — LOW (ref 42–52)
HCT VFR BLD CALC: 42.4 % — SIGNIFICANT CHANGE UP (ref 42–52)
HCT VFR BLD CALC: 42.8 % — SIGNIFICANT CHANGE UP (ref 42–52)
HCT VFR BLD CALC: 44.5 % — SIGNIFICANT CHANGE UP (ref 42–52)
HCT VFR BLD CALC: 44.8 % — SIGNIFICANT CHANGE UP (ref 42–52)
HGB BLD-MCNC: 13.1 G/DL — LOW (ref 14–18)
HGB BLD-MCNC: 13.2 G/DL — LOW (ref 14–18)
HGB BLD-MCNC: 13.3 G/DL — LOW (ref 14–18)
HGB BLD-MCNC: 13.3 G/DL — LOW (ref 14–18)
HGB BLD-MCNC: 13.4 G/DL — LOW (ref 14–18)
HGB BLD-MCNC: 13.6 G/DL — LOW (ref 14–18)
HGB BLD-MCNC: 13.8 G/DL — LOW (ref 14–18)
HGB BLD-MCNC: 13.9 G/DL — LOW (ref 14–18)
HGB BLD-MCNC: 13.9 G/DL — LOW (ref 14–18)
HGB BLD-MCNC: 14 G/DL — SIGNIFICANT CHANGE UP (ref 14–18)
HGB BLD-MCNC: 14 G/DL — SIGNIFICANT CHANGE UP (ref 14–18)
HGB BLD-MCNC: 14.4 G/DL — SIGNIFICANT CHANGE UP (ref 14–18)
HGB BLD-MCNC: 14.7 G/DL — SIGNIFICANT CHANGE UP (ref 14–18)
HGB BLD-MCNC: 14.9 G/DL — SIGNIFICANT CHANGE UP (ref 14–18)
HGB BLD-MCNC: 15.1 G/DL — SIGNIFICANT CHANGE UP (ref 14–18)
HGB BLD-MCNC: 15.6 G/DL — SIGNIFICANT CHANGE UP (ref 14–18)
HIV 1+2 AB+HIV1 P24 AG SERPL QL IA: SIGNIFICANT CHANGE UP
HOROWITZ INDEX BLDA+IHG-RTO: 100 — SIGNIFICANT CHANGE UP
HOROWITZ INDEX BLDA+IHG-RTO: 40 — SIGNIFICANT CHANGE UP
IMM GRANULOCYTES NFR BLD AUTO: 0.2 % — SIGNIFICANT CHANGE UP (ref 0.1–0.3)
IMM GRANULOCYTES NFR BLD AUTO: 0.5 % — HIGH (ref 0.1–0.3)
IMM GRANULOCYTES NFR BLD AUTO: 0.6 % — HIGH (ref 0.1–0.3)
IMM GRANULOCYTES NFR BLD AUTO: 0.7 % — HIGH (ref 0.1–0.3)
IMM GRANULOCYTES NFR BLD AUTO: 0.8 % — HIGH (ref 0.1–0.3)
IMM GRANULOCYTES NFR BLD AUTO: 0.8 % — HIGH (ref 0.1–0.3)
IMM GRANULOCYTES NFR BLD AUTO: 0.9 % — HIGH (ref 0.1–0.3)
IMM GRANULOCYTES NFR BLD AUTO: 1 % — HIGH (ref 0.1–0.3)
IMM GRANULOCYTES NFR BLD AUTO: 1.1 % — HIGH (ref 0.1–0.3)
INR BLD: 1.13 RATIO — SIGNIFICANT CHANGE UP (ref 0.65–1.3)
KETONES UR-MCNC: NEGATIVE MG/DL — SIGNIFICANT CHANGE UP
LACTATE SERPL-SCNC: 1.7 MMOL/L — SIGNIFICANT CHANGE UP (ref 0.7–2)
LEUKOCYTE ESTERASE UR-ACNC: ABNORMAL
LIDOCAIN IGE QN: 32 U/L — SIGNIFICANT CHANGE UP (ref 7–60)
LYMPHOCYTES # BLD AUTO: 1.4 K/UL — SIGNIFICANT CHANGE UP (ref 1.2–3.4)
LYMPHOCYTES # BLD AUTO: 1.53 K/UL — SIGNIFICANT CHANGE UP (ref 1.2–3.4)
LYMPHOCYTES # BLD AUTO: 1.56 K/UL — SIGNIFICANT CHANGE UP (ref 1.2–3.4)
LYMPHOCYTES # BLD AUTO: 1.6 K/UL — SIGNIFICANT CHANGE UP (ref 1.2–3.4)
LYMPHOCYTES # BLD AUTO: 1.68 K/UL — SIGNIFICANT CHANGE UP (ref 1.2–3.4)
LYMPHOCYTES # BLD AUTO: 1.86 K/UL — SIGNIFICANT CHANGE UP (ref 1.2–3.4)
LYMPHOCYTES # BLD AUTO: 1.87 K/UL — SIGNIFICANT CHANGE UP (ref 1.2–3.4)
LYMPHOCYTES # BLD AUTO: 1.94 K/UL — SIGNIFICANT CHANGE UP (ref 1.2–3.4)
LYMPHOCYTES # BLD AUTO: 1.94 K/UL — SIGNIFICANT CHANGE UP (ref 1.2–3.4)
LYMPHOCYTES # BLD AUTO: 1.95 K/UL — SIGNIFICANT CHANGE UP (ref 1.2–3.4)
LYMPHOCYTES # BLD AUTO: 1.97 K/UL — SIGNIFICANT CHANGE UP (ref 1.2–3.4)
LYMPHOCYTES # BLD AUTO: 1.97 K/UL — SIGNIFICANT CHANGE UP (ref 1.2–3.4)
LYMPHOCYTES # BLD AUTO: 11 % — LOW (ref 20.5–51.1)
LYMPHOCYTES # BLD AUTO: 14 % — LOW (ref 20.5–51.1)
LYMPHOCYTES # BLD AUTO: 15.4 % — LOW (ref 20.5–51.1)
LYMPHOCYTES # BLD AUTO: 16.2 % — LOW (ref 20.5–51.1)
LYMPHOCYTES # BLD AUTO: 16.2 % — LOW (ref 20.5–51.1)
LYMPHOCYTES # BLD AUTO: 17.1 % — LOW (ref 20.5–51.1)
LYMPHOCYTES # BLD AUTO: 2.02 K/UL — SIGNIFICANT CHANGE UP (ref 1.2–3.4)
LYMPHOCYTES # BLD AUTO: 2.1 K/UL — SIGNIFICANT CHANGE UP (ref 1.2–3.4)
LYMPHOCYTES # BLD AUTO: 2.21 K/UL — SIGNIFICANT CHANGE UP (ref 1.2–3.4)
LYMPHOCYTES # BLD AUTO: 2.29 K/UL — SIGNIFICANT CHANGE UP (ref 1.2–3.4)
LYMPHOCYTES # BLD AUTO: 20.8 % — SIGNIFICANT CHANGE UP (ref 20.5–51.1)
LYMPHOCYTES # BLD AUTO: 22.5 % — SIGNIFICANT CHANGE UP (ref 20.5–51.1)
LYMPHOCYTES # BLD AUTO: 25.3 % — SIGNIFICANT CHANGE UP (ref 20.5–51.1)
LYMPHOCYTES # BLD AUTO: 25.6 % — SIGNIFICANT CHANGE UP (ref 20.5–51.1)
LYMPHOCYTES # BLD AUTO: 26.8 % — SIGNIFICANT CHANGE UP (ref 20.5–51.1)
LYMPHOCYTES # BLD AUTO: 27.6 % — SIGNIFICANT CHANGE UP (ref 20.5–51.1)
LYMPHOCYTES # BLD AUTO: 29.2 % — SIGNIFICANT CHANGE UP (ref 20.5–51.1)
LYMPHOCYTES # BLD AUTO: 32.1 % — SIGNIFICANT CHANGE UP (ref 20.5–51.1)
LYMPHOCYTES # BLD AUTO: 34.1 % — SIGNIFICANT CHANGE UP (ref 20.5–51.1)
LYMPHOCYTES # BLD AUTO: 35.5 % — SIGNIFICANT CHANGE UP (ref 20.5–51.1)
MAGNESIUM SERPL-MCNC: 2 MG/DL — SIGNIFICANT CHANGE UP (ref 1.8–2.4)
MAGNESIUM SERPL-MCNC: 2 MG/DL — SIGNIFICANT CHANGE UP (ref 1.8–2.4)
MAGNESIUM SERPL-MCNC: 2.1 MG/DL — SIGNIFICANT CHANGE UP (ref 1.8–2.4)
MAGNESIUM SERPL-MCNC: 2.2 MG/DL — SIGNIFICANT CHANGE UP (ref 1.8–2.4)
MAGNESIUM SERPL-MCNC: 2.2 MG/DL — SIGNIFICANT CHANGE UP (ref 1.8–2.4)
MAGNESIUM SERPL-MCNC: 2.3 MG/DL — SIGNIFICANT CHANGE UP (ref 1.8–2.4)
MCHC RBC-ENTMCNC: 31.4 PG — HIGH (ref 27–31)
MCHC RBC-ENTMCNC: 31.5 PG — HIGH (ref 27–31)
MCHC RBC-ENTMCNC: 31.6 PG — HIGH (ref 27–31)
MCHC RBC-ENTMCNC: 31.7 PG — HIGH (ref 27–31)
MCHC RBC-ENTMCNC: 31.8 PG — HIGH (ref 27–31)
MCHC RBC-ENTMCNC: 31.9 PG — HIGH (ref 27–31)
MCHC RBC-ENTMCNC: 31.9 PG — HIGH (ref 27–31)
MCHC RBC-ENTMCNC: 32 PG — HIGH (ref 27–31)
MCHC RBC-ENTMCNC: 32.1 PG — HIGH (ref 27–31)
MCHC RBC-ENTMCNC: 32.3 PG — HIGH (ref 27–31)
MCHC RBC-ENTMCNC: 33.3 G/DL — SIGNIFICANT CHANGE UP (ref 32–37)
MCHC RBC-ENTMCNC: 33.9 G/DL — SIGNIFICANT CHANGE UP (ref 32–37)
MCHC RBC-ENTMCNC: 33.9 G/DL — SIGNIFICANT CHANGE UP (ref 32–37)
MCHC RBC-ENTMCNC: 34 G/DL — SIGNIFICANT CHANGE UP (ref 32–37)
MCHC RBC-ENTMCNC: 34 G/DL — SIGNIFICANT CHANGE UP (ref 32–37)
MCHC RBC-ENTMCNC: 34.1 G/DL — SIGNIFICANT CHANGE UP (ref 32–37)
MCHC RBC-ENTMCNC: 34.2 G/DL — SIGNIFICANT CHANGE UP (ref 32–37)
MCHC RBC-ENTMCNC: 34.2 G/DL — SIGNIFICANT CHANGE UP (ref 32–37)
MCHC RBC-ENTMCNC: 34.4 G/DL — SIGNIFICANT CHANGE UP (ref 32–37)
MCHC RBC-ENTMCNC: 34.5 G/DL — SIGNIFICANT CHANGE UP (ref 32–37)
MCHC RBC-ENTMCNC: 34.5 G/DL — SIGNIFICANT CHANGE UP (ref 32–37)
MCHC RBC-ENTMCNC: 34.6 G/DL — SIGNIFICANT CHANGE UP (ref 32–37)
MCHC RBC-ENTMCNC: 34.7 G/DL — SIGNIFICANT CHANGE UP (ref 32–37)
MCHC RBC-ENTMCNC: 34.7 G/DL — SIGNIFICANT CHANGE UP (ref 32–37)
MCHC RBC-ENTMCNC: 34.8 G/DL — SIGNIFICANT CHANGE UP (ref 32–37)
MCHC RBC-ENTMCNC: 34.8 G/DL — SIGNIFICANT CHANGE UP (ref 32–37)
MCV RBC AUTO: 90.3 FL — SIGNIFICANT CHANGE UP (ref 80–94)
MCV RBC AUTO: 90.9 FL — SIGNIFICANT CHANGE UP (ref 80–94)
MCV RBC AUTO: 91.4 FL — SIGNIFICANT CHANGE UP (ref 80–94)
MCV RBC AUTO: 91.4 FL — SIGNIFICANT CHANGE UP (ref 80–94)
MCV RBC AUTO: 92 FL — SIGNIFICANT CHANGE UP (ref 80–94)
MCV RBC AUTO: 92.1 FL — SIGNIFICANT CHANGE UP (ref 80–94)
MCV RBC AUTO: 92.3 FL — SIGNIFICANT CHANGE UP (ref 80–94)
MCV RBC AUTO: 92.3 FL — SIGNIFICANT CHANGE UP (ref 80–94)
MCV RBC AUTO: 92.9 FL — SIGNIFICANT CHANGE UP (ref 80–94)
MCV RBC AUTO: 93 FL — SIGNIFICANT CHANGE UP (ref 80–94)
MCV RBC AUTO: 93.1 FL — SIGNIFICANT CHANGE UP (ref 80–94)
MCV RBC AUTO: 93.1 FL — SIGNIFICANT CHANGE UP (ref 80–94)
MCV RBC AUTO: 93.4 FL — SIGNIFICANT CHANGE UP (ref 80–94)
MCV RBC AUTO: 94 FL — SIGNIFICANT CHANGE UP (ref 80–94)
MCV RBC AUTO: 94.8 FL — HIGH (ref 80–94)
MCV RBC AUTO: 95.3 FL — HIGH (ref 80–94)
MONOCYTES # BLD AUTO: 0.56 K/UL — SIGNIFICANT CHANGE UP (ref 0.1–0.6)
MONOCYTES # BLD AUTO: 0.62 K/UL — HIGH (ref 0.1–0.6)
MONOCYTES # BLD AUTO: 0.64 K/UL — HIGH (ref 0.1–0.6)
MONOCYTES # BLD AUTO: 0.64 K/UL — HIGH (ref 0.1–0.6)
MONOCYTES # BLD AUTO: 0.65 K/UL — HIGH (ref 0.1–0.6)
MONOCYTES # BLD AUTO: 0.74 K/UL — HIGH (ref 0.1–0.6)
MONOCYTES # BLD AUTO: 0.74 K/UL — HIGH (ref 0.1–0.6)
MONOCYTES # BLD AUTO: 0.75 K/UL — HIGH (ref 0.1–0.6)
MONOCYTES # BLD AUTO: 0.81 K/UL — HIGH (ref 0.1–0.6)
MONOCYTES # BLD AUTO: 0.86 K/UL — HIGH (ref 0.1–0.6)
MONOCYTES # BLD AUTO: 0.96 K/UL — HIGH (ref 0.1–0.6)
MONOCYTES # BLD AUTO: 1.14 K/UL — HIGH (ref 0.1–0.6)
MONOCYTES # BLD AUTO: 1.17 K/UL — HIGH (ref 0.1–0.6)
MONOCYTES # BLD AUTO: 1.2 K/UL — HIGH (ref 0.1–0.6)
MONOCYTES # BLD AUTO: 1.21 K/UL — HIGH (ref 0.1–0.6)
MONOCYTES # BLD AUTO: 1.41 K/UL — HIGH (ref 0.1–0.6)
MONOCYTES NFR BLD AUTO: 10.1 % — HIGH (ref 1.7–9.3)
MONOCYTES NFR BLD AUTO: 10.3 % — HIGH (ref 1.7–9.3)
MONOCYTES NFR BLD AUTO: 10.3 % — HIGH (ref 1.7–9.3)
MONOCYTES NFR BLD AUTO: 10.5 % — HIGH (ref 1.7–9.3)
MONOCYTES NFR BLD AUTO: 10.6 % — HIGH (ref 1.7–9.3)
MONOCYTES NFR BLD AUTO: 11.3 % — HIGH (ref 1.7–9.3)
MONOCYTES NFR BLD AUTO: 12.4 % — HIGH (ref 1.7–9.3)
MONOCYTES NFR BLD AUTO: 8.3 % — SIGNIFICANT CHANGE UP (ref 1.7–9.3)
MONOCYTES NFR BLD AUTO: 8.7 % — SIGNIFICANT CHANGE UP (ref 1.7–9.3)
MONOCYTES NFR BLD AUTO: 8.7 % — SIGNIFICANT CHANGE UP (ref 1.7–9.3)
MONOCYTES NFR BLD AUTO: 9.2 % — SIGNIFICANT CHANGE UP (ref 1.7–9.3)
MONOCYTES NFR BLD AUTO: 9.5 % — HIGH (ref 1.7–9.3)
MONOCYTES NFR BLD AUTO: 9.6 % — HIGH (ref 1.7–9.3)
MONOCYTES NFR BLD AUTO: 9.9 % — HIGH (ref 1.7–9.3)
NEUTROPHILS # BLD AUTO: 10.96 K/UL — HIGH (ref 1.4–6.5)
NEUTROPHILS # BLD AUTO: 2.38 K/UL — SIGNIFICANT CHANGE UP (ref 1.4–6.5)
NEUTROPHILS # BLD AUTO: 2.76 K/UL — SIGNIFICANT CHANGE UP (ref 1.4–6.5)
NEUTROPHILS # BLD AUTO: 3.07 K/UL — SIGNIFICANT CHANGE UP (ref 1.4–6.5)
NEUTROPHILS # BLD AUTO: 3.69 K/UL — SIGNIFICANT CHANGE UP (ref 1.4–6.5)
NEUTROPHILS # BLD AUTO: 3.7 K/UL — SIGNIFICANT CHANGE UP (ref 1.4–6.5)
NEUTROPHILS # BLD AUTO: 4.05 K/UL — SIGNIFICANT CHANGE UP (ref 1.4–6.5)
NEUTROPHILS # BLD AUTO: 4.58 K/UL — SIGNIFICANT CHANGE UP (ref 1.4–6.5)
NEUTROPHILS # BLD AUTO: 4.61 K/UL — SIGNIFICANT CHANGE UP (ref 1.4–6.5)
NEUTROPHILS # BLD AUTO: 6.39 K/UL — SIGNIFICANT CHANGE UP (ref 1.4–6.5)
NEUTROPHILS # BLD AUTO: 6.87 K/UL — HIGH (ref 1.4–6.5)
NEUTROPHILS # BLD AUTO: 6.89 K/UL — HIGH (ref 1.4–6.5)
NEUTROPHILS # BLD AUTO: 7.26 K/UL — HIGH (ref 1.4–6.5)
NEUTROPHILS # BLD AUTO: 7.5 K/UL — HIGH (ref 1.4–6.5)
NEUTROPHILS # BLD AUTO: 8.22 K/UL — HIGH (ref 1.4–6.5)
NEUTROPHILS # BLD AUTO: 8.42 K/UL — HIGH (ref 1.4–6.5)
NEUTROPHILS NFR BLD AUTO: 45.3 % — SIGNIFICANT CHANGE UP (ref 42.2–75.2)
NEUTROPHILS NFR BLD AUTO: 47.8 % — SIGNIFICANT CHANGE UP (ref 42.2–75.2)
NEUTROPHILS NFR BLD AUTO: 50.8 % — SIGNIFICANT CHANGE UP (ref 42.2–75.2)
NEUTROPHILS NFR BLD AUTO: 54.7 % — SIGNIFICANT CHANGE UP (ref 42.2–75.2)
NEUTROPHILS NFR BLD AUTO: 55.2 % — SIGNIFICANT CHANGE UP (ref 42.2–75.2)
NEUTROPHILS NFR BLD AUTO: 58.2 % — SIGNIFICANT CHANGE UP (ref 42.2–75.2)
NEUTROPHILS NFR BLD AUTO: 59.8 % — SIGNIFICANT CHANGE UP (ref 42.2–75.2)
NEUTROPHILS NFR BLD AUTO: 60.6 % — SIGNIFICANT CHANGE UP (ref 42.2–75.2)
NEUTROPHILS NFR BLD AUTO: 62.7 % — SIGNIFICANT CHANGE UP (ref 42.2–75.2)
NEUTROPHILS NFR BLD AUTO: 65.1 % — SIGNIFICANT CHANGE UP (ref 42.2–75.2)
NEUTROPHILS NFR BLD AUTO: 66.2 % — SIGNIFICANT CHANGE UP (ref 42.2–75.2)
NEUTROPHILS NFR BLD AUTO: 69.7 % — SIGNIFICANT CHANGE UP (ref 42.2–75.2)
NEUTROPHILS NFR BLD AUTO: 71.8 % — SIGNIFICANT CHANGE UP (ref 42.2–75.2)
NEUTROPHILS NFR BLD AUTO: 73.5 % — SIGNIFICANT CHANGE UP (ref 42.2–75.2)
NEUTROPHILS NFR BLD AUTO: 75.7 % — HIGH (ref 42.2–75.2)
NEUTROPHILS NFR BLD AUTO: 79 % — HIGH (ref 42.2–75.2)
NITRITE UR-MCNC: NEGATIVE — SIGNIFICANT CHANGE UP
NRBC BLD AUTO-RTO: 0 /100 WBCS — SIGNIFICANT CHANGE UP (ref 0–0)
PCO2 BLDA: 25 MMHG — LOW (ref 35–48)
PCO2 BLDA: 26 MMHG — LOW (ref 35–48)
PCO2 BLDA: 29 MMHG — LOW (ref 35–48)
PH BLDA: 7.52 — HIGH (ref 7.35–7.45)
PH BLDA: 7.53 — HIGH (ref 7.35–7.45)
PH BLDA: 7.54 — HIGH (ref 7.35–7.45)
PH UR: 7 — SIGNIFICANT CHANGE UP (ref 5–8)
PLATELET # BLD AUTO: 203 K/UL — SIGNIFICANT CHANGE UP (ref 130–400)
PLATELET # BLD AUTO: 213 K/UL — SIGNIFICANT CHANGE UP (ref 130–400)
PLATELET # BLD AUTO: 222 K/UL — SIGNIFICANT CHANGE UP (ref 130–400)
PLATELET # BLD AUTO: 222 K/UL — SIGNIFICANT CHANGE UP (ref 130–400)
PLATELET # BLD AUTO: 232 K/UL — SIGNIFICANT CHANGE UP (ref 130–400)
PLATELET # BLD AUTO: 242 K/UL — SIGNIFICANT CHANGE UP (ref 130–400)
PLATELET # BLD AUTO: 242 K/UL — SIGNIFICANT CHANGE UP (ref 130–400)
PLATELET # BLD AUTO: 245 K/UL — SIGNIFICANT CHANGE UP (ref 130–400)
PLATELET # BLD AUTO: 247 K/UL — SIGNIFICANT CHANGE UP (ref 130–400)
PLATELET # BLD AUTO: 261 K/UL — SIGNIFICANT CHANGE UP (ref 130–400)
PLATELET # BLD AUTO: 267 K/UL — SIGNIFICANT CHANGE UP (ref 130–400)
PLATELET # BLD AUTO: 302 K/UL — SIGNIFICANT CHANGE UP (ref 130–400)
PLATELET # BLD AUTO: 304 K/UL — SIGNIFICANT CHANGE UP (ref 130–400)
PLATELET # BLD AUTO: 313 K/UL — SIGNIFICANT CHANGE UP (ref 130–400)
PMV BLD: 10.2 FL — SIGNIFICANT CHANGE UP (ref 7.4–10.4)
PMV BLD: 10.3 FL — SIGNIFICANT CHANGE UP (ref 7.4–10.4)
PMV BLD: 10.4 FL — SIGNIFICANT CHANGE UP (ref 7.4–10.4)
PMV BLD: 10.5 FL — HIGH (ref 7.4–10.4)
PMV BLD: 10.5 FL — HIGH (ref 7.4–10.4)
PMV BLD: 10.6 FL — HIGH (ref 7.4–10.4)
PMV BLD: 9.9 FL — SIGNIFICANT CHANGE UP (ref 7.4–10.4)
PO2 BLDA: 42 MMHG — CRITICAL LOW (ref 83–108)
PO2 BLDA: 48 MMHG — CRITICAL LOW (ref 83–108)
PO2 BLDA: 54 MMHG — LOW (ref 83–108)
POTASSIUM SERPL-MCNC: 3.1 MMOL/L — LOW (ref 3.5–5)
POTASSIUM SERPL-MCNC: 3.4 MMOL/L — LOW (ref 3.5–5)
POTASSIUM SERPL-MCNC: 3.4 MMOL/L — LOW (ref 3.5–5)
POTASSIUM SERPL-MCNC: 3.5 MMOL/L — SIGNIFICANT CHANGE UP (ref 3.5–5)
POTASSIUM SERPL-MCNC: 3.6 MMOL/L — SIGNIFICANT CHANGE UP (ref 3.5–5)
POTASSIUM SERPL-MCNC: 3.7 MMOL/L — SIGNIFICANT CHANGE UP (ref 3.5–5)
POTASSIUM SERPL-MCNC: 3.8 MMOL/L — SIGNIFICANT CHANGE UP (ref 3.5–5)
POTASSIUM SERPL-MCNC: 3.9 MMOL/L — SIGNIFICANT CHANGE UP (ref 3.5–5)
POTASSIUM SERPL-MCNC: 4 MMOL/L — SIGNIFICANT CHANGE UP (ref 3.5–5)
POTASSIUM SERPL-MCNC: 4.1 MMOL/L — SIGNIFICANT CHANGE UP (ref 3.5–5)
POTASSIUM SERPL-SCNC: 3.1 MMOL/L — LOW (ref 3.5–5)
POTASSIUM SERPL-SCNC: 3.4 MMOL/L — LOW (ref 3.5–5)
POTASSIUM SERPL-SCNC: 3.4 MMOL/L — LOW (ref 3.5–5)
POTASSIUM SERPL-SCNC: 3.5 MMOL/L — SIGNIFICANT CHANGE UP (ref 3.5–5)
POTASSIUM SERPL-SCNC: 3.6 MMOL/L — SIGNIFICANT CHANGE UP (ref 3.5–5)
POTASSIUM SERPL-SCNC: 3.7 MMOL/L — SIGNIFICANT CHANGE UP (ref 3.5–5)
POTASSIUM SERPL-SCNC: 3.8 MMOL/L — SIGNIFICANT CHANGE UP (ref 3.5–5)
POTASSIUM SERPL-SCNC: 3.9 MMOL/L — SIGNIFICANT CHANGE UP (ref 3.5–5)
POTASSIUM SERPL-SCNC: 4 MMOL/L — SIGNIFICANT CHANGE UP (ref 3.5–5)
POTASSIUM SERPL-SCNC: 4.1 MMOL/L — SIGNIFICANT CHANGE UP (ref 3.5–5)
PROT SERPL-MCNC: 6 G/DL — SIGNIFICANT CHANGE UP (ref 6–8)
PROT SERPL-MCNC: 6 G/DL — SIGNIFICANT CHANGE UP (ref 6–8)
PROT SERPL-MCNC: 6.1 G/DL — SIGNIFICANT CHANGE UP (ref 6–8)
PROT SERPL-MCNC: 6.2 G/DL — SIGNIFICANT CHANGE UP (ref 6–8)
PROT SERPL-MCNC: 6.2 G/DL — SIGNIFICANT CHANGE UP (ref 6–8)
PROT SERPL-MCNC: 6.3 G/DL — SIGNIFICANT CHANGE UP (ref 6–8)
PROT SERPL-MCNC: 6.4 G/DL — SIGNIFICANT CHANGE UP (ref 6–8)
PROT SERPL-MCNC: 6.4 G/DL — SIGNIFICANT CHANGE UP (ref 6–8)
PROT SERPL-MCNC: 6.5 G/DL — SIGNIFICANT CHANGE UP (ref 6–8)
PROT SERPL-MCNC: 6.5 G/DL — SIGNIFICANT CHANGE UP (ref 6–8)
PROT SERPL-MCNC: 6.6 G/DL — SIGNIFICANT CHANGE UP (ref 6–8)
PROT SERPL-MCNC: 6.8 G/DL — SIGNIFICANT CHANGE UP (ref 6–8)
PROT SERPL-MCNC: 7.3 G/DL — SIGNIFICANT CHANGE UP (ref 6–8)
PROT UR-MCNC: 100 MG/DL
PROTHROM AB SERPL-ACNC: 13.4 SEC — HIGH (ref 9.95–12.87)
RBC # BLD: 4.13 M/UL — LOW (ref 4.7–6.1)
RBC # BLD: 4.19 M/UL — LOW (ref 4.7–6.1)
RBC # BLD: 4.19 M/UL — LOW (ref 4.7–6.1)
RBC # BLD: 4.2 M/UL — LOW (ref 4.7–6.1)
RBC # BLD: 4.21 M/UL — LOW (ref 4.7–6.1)
RBC # BLD: 4.31 M/UL — LOW (ref 4.7–6.1)
RBC # BLD: 4.36 M/UL — LOW (ref 4.7–6.1)
RBC # BLD: 4.36 M/UL — LOW (ref 4.7–6.1)
RBC # BLD: 4.38 M/UL — LOW (ref 4.7–6.1)
RBC # BLD: 4.39 M/UL — LOW (ref 4.7–6.1)
RBC # BLD: 4.4 M/UL — LOW (ref 4.7–6.1)
RBC # BLD: 4.49 M/UL — LOW (ref 4.7–6.1)
RBC # BLD: 4.64 M/UL — LOW (ref 4.7–6.1)
RBC # BLD: 4.67 M/UL — LOW (ref 4.7–6.1)
RBC # BLD: 4.74 M/UL — SIGNIFICANT CHANGE UP (ref 4.7–6.1)
RBC # BLD: 4.93 M/UL — SIGNIFICANT CHANGE UP (ref 4.7–6.1)
RBC # FLD: 13.5 % — SIGNIFICANT CHANGE UP (ref 11.5–14.5)
RBC # FLD: 13.6 % — SIGNIFICANT CHANGE UP (ref 11.5–14.5)
RBC # FLD: 13.7 % — SIGNIFICANT CHANGE UP (ref 11.5–14.5)
RBC # FLD: 13.8 % — SIGNIFICANT CHANGE UP (ref 11.5–14.5)
RBC # FLD: 13.8 % — SIGNIFICANT CHANGE UP (ref 11.5–14.5)
RBC # FLD: 13.9 % — SIGNIFICANT CHANGE UP (ref 11.5–14.5)
RBC # FLD: 14 % — SIGNIFICANT CHANGE UP (ref 11.5–14.5)
RBC # FLD: 14.2 % — SIGNIFICANT CHANGE UP (ref 11.5–14.5)
RBC # FLD: 14.3 % — SIGNIFICANT CHANGE UP (ref 11.5–14.5)
RBC # FLD: 14.4 % — SIGNIFICANT CHANGE UP (ref 11.5–14.5)
SAO2 % BLDA: 77.2 % — LOW (ref 94–98)
SAO2 % BLDA: 83.7 % — LOW (ref 94–98)
SAO2 % BLDA: 89.4 % — LOW (ref 94–98)
SODIUM SERPL-SCNC: 138 MMOL/L — SIGNIFICANT CHANGE UP (ref 135–146)
SODIUM SERPL-SCNC: 139 MMOL/L — SIGNIFICANT CHANGE UP (ref 135–146)
SODIUM SERPL-SCNC: 140 MMOL/L — SIGNIFICANT CHANGE UP (ref 135–146)
SODIUM SERPL-SCNC: 141 MMOL/L — SIGNIFICANT CHANGE UP (ref 135–146)
SODIUM SERPL-SCNC: 141 MMOL/L — SIGNIFICANT CHANGE UP (ref 135–146)
SODIUM SERPL-SCNC: 142 MMOL/L — SIGNIFICANT CHANGE UP (ref 135–146)
SODIUM SERPL-SCNC: 143 MMOL/L — SIGNIFICANT CHANGE UP (ref 135–146)
SODIUM SERPL-SCNC: 143 MMOL/L — SIGNIFICANT CHANGE UP (ref 135–146)
SODIUM SERPL-SCNC: 144 MMOL/L — SIGNIFICANT CHANGE UP (ref 135–146)
SODIUM SERPL-SCNC: 148 MMOL/L — HIGH (ref 135–146)
SP GR SPEC: >1.03 — HIGH (ref 1–1.03)
T PALLIDUM AB TITR SER: NEGATIVE — SIGNIFICANT CHANGE UP
TROPONIN T, HIGH SENSITIVITY RESULT: 50 NG/L — HIGH (ref 6–21)
TROPONIN T, HIGH SENSITIVITY RESULT: 56 NG/L — CRITICAL HIGH (ref 6–21)
TSH SERPL-MCNC: 0.69 UIU/ML — SIGNIFICANT CHANGE UP (ref 0.27–4.2)
UROBILINOGEN FLD QL: 1 MG/DL — SIGNIFICANT CHANGE UP (ref 0.2–1)
VALPROATE SERPL-MCNC: 72 UG/ML — SIGNIFICANT CHANGE UP (ref 50–100)
VIT B1 SERPL-MCNC: 288.2 NMOL/L — HIGH (ref 66.5–200)
VIT B12 SERPL-MCNC: 1174 PG/ML — SIGNIFICANT CHANGE UP (ref 232–1245)
WBC # BLD: 10.4 K/UL — SIGNIFICANT CHANGE UP (ref 4.8–10.8)
WBC # BLD: 11.01 K/UL — HIGH (ref 4.8–10.8)
WBC # BLD: 11.33 K/UL — HIGH (ref 4.8–10.8)
WBC # BLD: 11.46 K/UL — HIGH (ref 4.8–10.8)
WBC # BLD: 11.46 K/UL — HIGH (ref 4.8–10.8)
WBC # BLD: 13.86 K/UL — HIGH (ref 4.8–10.8)
WBC # BLD: 5.27 K/UL — SIGNIFICANT CHANGE UP (ref 4.8–10.8)
WBC # BLD: 5.77 K/UL — SIGNIFICANT CHANGE UP (ref 4.8–10.8)
WBC # BLD: 6.04 K/UL — SIGNIFICANT CHANGE UP (ref 4.8–10.8)
WBC # BLD: 6.1 K/UL — SIGNIFICANT CHANGE UP (ref 4.8–10.8)
WBC # BLD: 6.75 K/UL — SIGNIFICANT CHANGE UP (ref 4.8–10.8)
WBC # BLD: 7.33 K/UL — SIGNIFICANT CHANGE UP (ref 4.8–10.8)
WBC # BLD: 7.71 K/UL — SIGNIFICANT CHANGE UP (ref 4.8–10.8)
WBC # BLD: 7.85 K/UL — SIGNIFICANT CHANGE UP (ref 4.8–10.8)
WBC # BLD: 9.08 K/UL — SIGNIFICANT CHANGE UP (ref 4.8–10.8)
WBC # BLD: 9.83 K/UL — SIGNIFICANT CHANGE UP (ref 4.8–10.8)
WBC # FLD AUTO: 10.4 K/UL — SIGNIFICANT CHANGE UP (ref 4.8–10.8)
WBC # FLD AUTO: 11.01 K/UL — HIGH (ref 4.8–10.8)
WBC # FLD AUTO: 11.33 K/UL — HIGH (ref 4.8–10.8)
WBC # FLD AUTO: 11.46 K/UL — HIGH (ref 4.8–10.8)
WBC # FLD AUTO: 11.46 K/UL — HIGH (ref 4.8–10.8)
WBC # FLD AUTO: 13.86 K/UL — HIGH (ref 4.8–10.8)
WBC # FLD AUTO: 5.27 K/UL — SIGNIFICANT CHANGE UP (ref 4.8–10.8)
WBC # FLD AUTO: 5.77 K/UL — SIGNIFICANT CHANGE UP (ref 4.8–10.8)
WBC # FLD AUTO: 6.04 K/UL — SIGNIFICANT CHANGE UP (ref 4.8–10.8)
WBC # FLD AUTO: 6.1 K/UL — SIGNIFICANT CHANGE UP (ref 4.8–10.8)
WBC # FLD AUTO: 6.75 K/UL — SIGNIFICANT CHANGE UP (ref 4.8–10.8)
WBC # FLD AUTO: 7.33 K/UL — SIGNIFICANT CHANGE UP (ref 4.8–10.8)
WBC # FLD AUTO: 7.71 K/UL — SIGNIFICANT CHANGE UP (ref 4.8–10.8)
WBC # FLD AUTO: 7.85 K/UL — SIGNIFICANT CHANGE UP (ref 4.8–10.8)
WBC # FLD AUTO: 9.08 K/UL — SIGNIFICANT CHANGE UP (ref 4.8–10.8)
WBC # FLD AUTO: 9.83 K/UL — SIGNIFICANT CHANGE UP (ref 4.8–10.8)

## 2025-01-01 PROCEDURE — 99232 SBSQ HOSP IP/OBS MODERATE 35: CPT | Mod: FS

## 2025-01-01 PROCEDURE — 71045 X-RAY EXAM CHEST 1 VIEW: CPT

## 2025-01-01 PROCEDURE — 99232 SBSQ HOSP IP/OBS MODERATE 35: CPT

## 2025-01-01 PROCEDURE — 99222 1ST HOSP IP/OBS MODERATE 55: CPT

## 2025-01-01 PROCEDURE — 82803 BLOOD GASES ANY COMBINATION: CPT

## 2025-01-01 PROCEDURE — 97162 PT EVAL MOD COMPLEX 30 MIN: CPT | Mod: GP

## 2025-01-01 PROCEDURE — 83605 ASSAY OF LACTIC ACID: CPT

## 2025-01-01 PROCEDURE — 71045 X-RAY EXAM CHEST 1 VIEW: CPT | Mod: 26,77

## 2025-01-01 PROCEDURE — 86780 TREPONEMA PALLIDUM: CPT

## 2025-01-01 PROCEDURE — 84443 ASSAY THYROID STIM HORMONE: CPT

## 2025-01-01 PROCEDURE — 84484 ASSAY OF TROPONIN QUANT: CPT

## 2025-01-01 PROCEDURE — 83735 ASSAY OF MAGNESIUM: CPT

## 2025-01-01 PROCEDURE — 80048 BASIC METABOLIC PNL TOTAL CA: CPT

## 2025-01-01 PROCEDURE — 85379 FIBRIN DEGRADATION QUANT: CPT

## 2025-01-01 PROCEDURE — 70450 CT HEAD/BRAIN W/O DYE: CPT | Mod: 26

## 2025-01-01 PROCEDURE — 87389 HIV-1 AG W/HIV-1&-2 AB AG IA: CPT

## 2025-01-01 PROCEDURE — 76770 US EXAM ABDO BACK WALL COMP: CPT

## 2025-01-01 PROCEDURE — 72170 X-RAY EXAM OF PELVIS: CPT | Mod: 26

## 2025-01-01 PROCEDURE — 90792 PSYCH DIAG EVAL W/MED SRVCS: CPT

## 2025-01-01 PROCEDURE — 97110 THERAPEUTIC EXERCISES: CPT | Mod: GP

## 2025-01-01 PROCEDURE — 71045 X-RAY EXAM CHEST 1 VIEW: CPT | Mod: 26

## 2025-01-01 PROCEDURE — 74018 RADEX ABDOMEN 1 VIEW: CPT

## 2025-01-01 PROCEDURE — 99233 SBSQ HOSP IP/OBS HIGH 50: CPT

## 2025-01-01 PROCEDURE — 99233 SBSQ HOSP IP/OBS HIGH 50: CPT | Mod: GC

## 2025-01-01 PROCEDURE — 93005 ELECTROCARDIOGRAM TRACING: CPT

## 2025-01-01 PROCEDURE — 74018 RADEX ABDOMEN 1 VIEW: CPT | Mod: 26

## 2025-01-01 PROCEDURE — 71275 CT ANGIOGRAPHY CHEST: CPT | Mod: 26

## 2025-01-01 PROCEDURE — 95816 EEG AWAKE AND DROWSY: CPT | Mod: 26

## 2025-01-01 PROCEDURE — 93010 ELECTROCARDIOGRAM REPORT: CPT

## 2025-01-01 PROCEDURE — 82607 VITAMIN B-12: CPT

## 2025-01-01 PROCEDURE — 85025 COMPLETE CBC W/AUTO DIFF WBC: CPT

## 2025-01-01 PROCEDURE — 80053 COMPREHEN METABOLIC PANEL: CPT

## 2025-01-01 PROCEDURE — 76770 US EXAM ABDO BACK WALL COMP: CPT | Mod: 26

## 2025-01-01 PROCEDURE — 99231 SBSQ HOSP IP/OBS SF/LOW 25: CPT

## 2025-01-01 PROCEDURE — 93306 TTE W/DOPPLER COMPLETE: CPT

## 2025-01-01 PROCEDURE — 71275 CT ANGIOGRAPHY CHEST: CPT | Mod: MC

## 2025-01-01 PROCEDURE — 71260 CT THORAX DX C+: CPT | Mod: 26

## 2025-01-01 PROCEDURE — 97530 THERAPEUTIC ACTIVITIES: CPT | Mod: GP

## 2025-01-01 PROCEDURE — 94760 N-INVAS EAR/PLS OXIMETRY 1: CPT

## 2025-01-01 PROCEDURE — 82962 GLUCOSE BLOOD TEST: CPT

## 2025-01-01 PROCEDURE — 74177 CT ABD & PELVIS W/CONTRAST: CPT | Mod: 26

## 2025-01-01 PROCEDURE — 99497 ADVNCD CARE PLAN 30 MIN: CPT | Mod: 25

## 2025-01-01 PROCEDURE — 93970 EXTREMITY STUDY: CPT

## 2025-01-01 PROCEDURE — 99232 SBSQ HOSP IP/OBS MODERATE 35: CPT | Mod: GC

## 2025-01-01 PROCEDURE — 84425 ASSAY OF VITAMIN B-1: CPT

## 2025-01-01 PROCEDURE — 99285 EMERGENCY DEPT VISIT HI MDM: CPT | Mod: FS

## 2025-01-01 PROCEDURE — 72125 CT NECK SPINE W/O DYE: CPT | Mod: 26

## 2025-01-01 PROCEDURE — 99239 HOSP IP/OBS DSCHRG MGMT >30: CPT

## 2025-01-01 PROCEDURE — 93970 EXTREMITY STUDY: CPT | Mod: 26

## 2025-01-01 PROCEDURE — 99223 1ST HOSP IP/OBS HIGH 75: CPT

## 2025-01-01 PROCEDURE — 80164 ASSAY DIPROPYLACETIC ACD TOT: CPT

## 2025-01-01 PROCEDURE — 93306 TTE W/DOPPLER COMPLETE: CPT | Mod: 26

## 2025-01-01 PROCEDURE — 97116 GAIT TRAINING THERAPY: CPT | Mod: GP

## 2025-01-01 PROCEDURE — 95819 EEG AWAKE AND ASLEEP: CPT

## 2025-01-01 PROCEDURE — 99231 SBSQ HOSP IP/OBS SF/LOW 25: CPT | Mod: FS

## 2025-01-01 PROCEDURE — 36415 COLL VENOUS BLD VENIPUNCTURE: CPT

## 2025-01-01 RX ORDER — QUETIAPINE FUMARATE 25 MG/1
25 TABLET ORAL ONCE
Refills: 0 | Status: COMPLETED | OUTPATIENT
Start: 2025-01-01 | End: 2025-01-01

## 2025-01-01 RX ORDER — TAMSULOSIN HYDROCHLORIDE 0.4 MG/1
1 CAPSULE ORAL
Qty: 0 | Refills: 0 | DISCHARGE
Start: 2025-01-01

## 2025-01-01 RX ORDER — LACTULOSE 10 G/15ML
20 SOLUTION ORAL
Refills: 0 | Status: COMPLETED | OUTPATIENT
Start: 2025-01-01 | End: 2025-01-01

## 2025-01-01 RX ORDER — QUETIAPINE FUMARATE 25 MG/1
25 TABLET ORAL
Refills: 0 | Status: DISCONTINUED | OUTPATIENT
Start: 2025-01-01 | End: 2025-01-01

## 2025-01-01 RX ORDER — LORAZEPAM 4 MG/ML
0.5 VIAL (ML) INJECTION ONCE
Refills: 0 | Status: DISCONTINUED | OUTPATIENT
Start: 2025-01-01 | End: 2025-01-01

## 2025-01-01 RX ORDER — TIMOLOL MALEATE 6.8 MG/ML
1 SOLUTION OPHTHALMIC
Qty: 0 | Refills: 0 | DISCHARGE
Start: 2025-01-01

## 2025-01-01 RX ORDER — HALOPERIDOL 10 MG/1
0.5 TABLET ORAL ONCE
Refills: 0 | Status: COMPLETED | OUTPATIENT
Start: 2025-01-01 | End: 2025-01-01

## 2025-01-01 RX ORDER — LACTULOSE 10 G/15ML
10 SOLUTION ORAL
Refills: 0 | Status: DISCONTINUED | OUTPATIENT
Start: 2025-01-01 | End: 2025-01-01

## 2025-01-01 RX ORDER — ONDANSETRON HCL/PF 4 MG/2 ML
4 VIAL (ML) INJECTION EVERY 8 HOURS
Refills: 0 | Status: DISCONTINUED | OUTPATIENT
Start: 2025-01-01 | End: 2025-01-01

## 2025-01-01 RX ORDER — NIFEDIPINE 30 MG
1 TABLET, EXTENDED RELEASE 24 HR ORAL
Qty: 0 | Refills: 0 | DISCHARGE
Start: 2025-01-01

## 2025-01-01 RX ORDER — DRONABINOL 10 MG/1
2.5 CAPSULE ORAL EVERY 8 HOURS
Refills: 0 | Status: DISCONTINUED | OUTPATIENT
Start: 2025-01-01 | End: 2025-01-01

## 2025-01-01 RX ORDER — OLANZAPINE 10 MG/1
2.5 TABLET ORAL
Refills: 0 | Status: DISCONTINUED | OUTPATIENT
Start: 2025-01-01 | End: 2025-01-01

## 2025-01-01 RX ORDER — MAGNESIUM, ALUMINUM HYDROXIDE 200-200 MG
30 TABLET,CHEWABLE ORAL EVERY 4 HOURS
Refills: 0 | Status: DISCONTINUED | OUTPATIENT
Start: 2025-01-01 | End: 2025-01-01

## 2025-01-01 RX ORDER — HALOPERIDOL 10 MG/1
0.5 TABLET ORAL AT BEDTIME
Refills: 0 | Status: DISCONTINUED | OUTPATIENT
Start: 2025-01-01 | End: 2025-01-01

## 2025-01-01 RX ORDER — ENOXAPARIN SODIUM 100 MG/ML
40 INJECTION SUBCUTANEOUS
Qty: 0 | Refills: 0 | DISCHARGE
Start: 2025-01-01

## 2025-01-01 RX ORDER — NIFEDIPINE 30 MG
1 TABLET, EXTENDED RELEASE 24 HR ORAL
Refills: 0 | DISCHARGE

## 2025-01-01 RX ORDER — LORAZEPAM 4 MG/ML
1 VIAL (ML) INJECTION ONCE
Refills: 0 | Status: DISCONTINUED | OUTPATIENT
Start: 2025-01-01 | End: 2025-01-01

## 2025-01-01 RX ORDER — ENOXAPARIN SODIUM 100 MG/ML
80 INJECTION SUBCUTANEOUS EVERY 12 HOURS
Refills: 0 | Status: DISCONTINUED | OUTPATIENT
Start: 2025-01-01 | End: 2025-01-01

## 2025-01-01 RX ORDER — QUETIAPINE FUMARATE 25 MG/1
25 TABLET ORAL AT BEDTIME
Refills: 0 | Status: DISCONTINUED | OUTPATIENT
Start: 2025-01-01 | End: 2025-01-01

## 2025-01-01 RX ORDER — ENOXAPARIN SODIUM 100 MG/ML
40 INJECTION SUBCUTANEOUS EVERY 24 HOURS
Refills: 0 | Status: DISCONTINUED | OUTPATIENT
Start: 2025-01-01 | End: 2025-01-01

## 2025-01-01 RX ORDER — CEFTRIAXONE 500 MG/1
1000 INJECTION, POWDER, FOR SOLUTION INTRAMUSCULAR; INTRAVENOUS ONCE
Refills: 0 | Status: COMPLETED | OUTPATIENT
Start: 2025-01-01 | End: 2025-01-01

## 2025-01-01 RX ORDER — NIFEDIPINE 30 MG
30 TABLET, EXTENDED RELEASE 24 HR ORAL DAILY
Refills: 0 | Status: DISCONTINUED | OUTPATIENT
Start: 2025-01-01 | End: 2025-01-01

## 2025-01-01 RX ORDER — LORAZEPAM 4 MG/ML
0.5 VIAL (ML) INJECTION EVERY 8 HOURS
Refills: 0 | Status: DISCONTINUED | OUTPATIENT
Start: 2025-01-01 | End: 2025-01-01

## 2025-01-01 RX ORDER — SENNA 187 MG
2 TABLET ORAL AT BEDTIME
Refills: 0 | Status: DISCONTINUED | OUTPATIENT
Start: 2025-01-01 | End: 2025-01-01

## 2025-01-01 RX ORDER — FUROSEMIDE 10 MG/ML
40 INJECTION INTRAMUSCULAR; INTRAVENOUS ONCE
Refills: 0 | Status: DISCONTINUED | OUTPATIENT
Start: 2025-01-01 | End: 2025-01-01

## 2025-01-01 RX ORDER — LATANOPROST PF 0.05 MG/ML
1 SOLUTION/ DROPS OPHTHALMIC
Qty: 0 | Refills: 0 | DISCHARGE
Start: 2025-01-01

## 2025-01-01 RX ORDER — LISINOPRIL 5 MG/1
10 TABLET ORAL DAILY
Refills: 0 | Status: DISCONTINUED | OUTPATIENT
Start: 2025-01-01 | End: 2025-01-01

## 2025-01-01 RX ORDER — DRONABINOL 10 MG/1
2.5 CAPSULE ORAL
Refills: 0 | Status: DISCONTINUED | OUTPATIENT
Start: 2025-01-01 | End: 2025-01-01

## 2025-01-01 RX ORDER — GABAPENTIN 400 MG/1
100 CAPSULE ORAL THREE TIMES A DAY
Refills: 0 | Status: DISCONTINUED | OUTPATIENT
Start: 2025-01-01 | End: 2025-01-01

## 2025-01-01 RX ORDER — POLYETHYLENE GLYCOL 3350 17 G/17G
17 POWDER, FOR SOLUTION ORAL
Refills: 0 | Status: DISCONTINUED | OUTPATIENT
Start: 2025-01-01 | End: 2025-01-01

## 2025-01-01 RX ORDER — TAMSULOSIN HYDROCHLORIDE 0.4 MG/1
0.8 CAPSULE ORAL AT BEDTIME
Refills: 0 | Status: DISCONTINUED | OUTPATIENT
Start: 2025-01-01 | End: 2025-01-01

## 2025-01-01 RX ORDER — METOPROLOL SUCCINATE 50 MG/1
50 TABLET, EXTENDED RELEASE ORAL DAILY
Refills: 0 | Status: DISCONTINUED | OUTPATIENT
Start: 2025-01-01 | End: 2025-01-01

## 2025-01-01 RX ORDER — LACTULOSE 10 G/15ML
20 SOLUTION ORAL ONCE
Refills: 0 | Status: COMPLETED | OUTPATIENT
Start: 2025-01-01 | End: 2025-01-01

## 2025-01-01 RX ORDER — METOPROLOL SUCCINATE 50 MG/1
25 TABLET, EXTENDED RELEASE ORAL DAILY
Refills: 0 | Status: DISCONTINUED | OUTPATIENT
Start: 2025-01-01 | End: 2025-01-01

## 2025-01-01 RX ORDER — LORAZEPAM 4 MG/ML
1 VIAL (ML) INJECTION EVERY 4 HOURS
Refills: 0 | Status: DISCONTINUED | OUTPATIENT
Start: 2025-01-01 | End: 2025-01-01

## 2025-01-01 RX ORDER — MELATONIN 5 MG
10 TABLET ORAL AT BEDTIME
Refills: 0 | Status: DISCONTINUED | OUTPATIENT
Start: 2025-01-01 | End: 2025-01-01

## 2025-01-01 RX ORDER — BRIMONIDINE TARTRATE 1.5 MG/ML
1 SOLUTION/ DROPS OPHTHALMIC
Qty: 0 | Refills: 0 | DISCHARGE
Start: 2025-01-01

## 2025-01-01 RX ORDER — LATANOPROST PF 0.05 MG/ML
1 SOLUTION/ DROPS OPHTHALMIC AT BEDTIME
Refills: 0 | Status: DISCONTINUED | OUTPATIENT
Start: 2025-01-01 | End: 2025-01-01

## 2025-01-01 RX ORDER — ACETAMINOPHEN 500 MG/5ML
650 LIQUID (ML) ORAL EVERY 6 HOURS
Refills: 0 | Status: DISCONTINUED | OUTPATIENT
Start: 2025-01-01 | End: 2025-01-01

## 2025-01-01 RX ORDER — TAMSULOSIN HYDROCHLORIDE 0.4 MG/1
0.4 CAPSULE ORAL ONCE
Refills: 0 | Status: COMPLETED | OUTPATIENT
Start: 2025-01-01 | End: 2025-01-01

## 2025-01-01 RX ORDER — B1/B2/B3/B5/B6/B12/VIT C/FOLIC 500-0.5 MG
1 TABLET ORAL DAILY
Refills: 0 | Status: COMPLETED | OUTPATIENT
Start: 2025-01-01 | End: 2025-01-01

## 2025-01-01 RX ORDER — FUROSEMIDE 10 MG/ML
40 INJECTION INTRAMUSCULAR; INTRAVENOUS ONCE
Refills: 0 | Status: COMPLETED | OUTPATIENT
Start: 2025-01-01 | End: 2025-01-01

## 2025-01-01 RX ORDER — MIDAZOLAM IN 0.9 % SOD.CHLORID 1 MG/ML
1 PLASTIC BAG, INJECTION (ML) INTRAVENOUS EVERY 8 HOURS
Refills: 0 | Status: DISCONTINUED | OUTPATIENT
Start: 2025-01-01 | End: 2025-01-01

## 2025-01-01 RX ORDER — BRIMONIDINE TARTRATE 1.5 MG/ML
1 SOLUTION/ DROPS OPHTHALMIC
Refills: 0 | Status: DISCONTINUED | OUTPATIENT
Start: 2025-01-01 | End: 2025-01-01

## 2025-01-01 RX ORDER — TIMOLOL MALEATE 6.8 MG/ML
1 SOLUTION OPHTHALMIC
Refills: 0 | Status: DISCONTINUED | OUTPATIENT
Start: 2025-01-01 | End: 2025-01-01

## 2025-01-01 RX ORDER — ARIPIPRAZOLE 2 MG/1
2.5 TABLET ORAL
Refills: 0 | Status: DISCONTINUED | OUTPATIENT
Start: 2025-01-01 | End: 2025-01-01

## 2025-01-01 RX ORDER — GLYCOPYRROLATE 0.2 MG/ML
0.2 INJECTION INTRAMUSCULAR; INTRAVENOUS EVERY 6 HOURS
Refills: 0 | Status: DISCONTINUED | OUTPATIENT
Start: 2025-01-01 | End: 2025-01-01

## 2025-01-01 RX ORDER — TAMSULOSIN HYDROCHLORIDE 0.4 MG/1
0.4 CAPSULE ORAL AT BEDTIME
Refills: 0 | Status: DISCONTINUED | OUTPATIENT
Start: 2025-01-01 | End: 2025-01-01

## 2025-01-01 RX ORDER — LORAZEPAM 4 MG/ML
2 VIAL (ML) INJECTION EVERY 4 HOURS
Refills: 0 | Status: DISCONTINUED | OUTPATIENT
Start: 2025-01-01 | End: 2025-01-01

## 2025-01-01 RX ORDER — FUROSEMIDE 10 MG/ML
10 INJECTION INTRAMUSCULAR; INTRAVENOUS DAILY
Refills: 0 | Status: DISCONTINUED | OUTPATIENT
Start: 2025-01-01 | End: 2025-01-01

## 2025-01-01 RX ADMIN — Medication 500 MILLIGRAM(S): at 17:31

## 2025-01-01 RX ADMIN — TAMSULOSIN HYDROCHLORIDE 0.4 MILLIGRAM(S): 0.4 CAPSULE ORAL at 21:23

## 2025-01-01 RX ADMIN — TIMOLOL MALEATE 1 DROP(S): 6.8 SOLUTION OPHTHALMIC at 06:02

## 2025-01-01 RX ADMIN — Medication 1 APPLICATION(S): at 11:19

## 2025-01-01 RX ADMIN — Medication 1 APPLICATION(S): at 12:20

## 2025-01-01 RX ADMIN — ARIPIPRAZOLE 2.5 MILLIGRAM(S): 2 TABLET ORAL at 22:01

## 2025-01-01 RX ADMIN — Medication 4 MILLIGRAM(S): at 18:57

## 2025-01-01 RX ADMIN — POLYETHYLENE GLYCOL 3350 17 GRAM(S): 17 POWDER, FOR SOLUTION ORAL at 18:44

## 2025-01-01 RX ADMIN — Medication 30 MILLIGRAM(S): at 05:33

## 2025-01-01 RX ADMIN — METOPROLOL SUCCINATE 25 MILLIGRAM(S): 50 TABLET, EXTENDED RELEASE ORAL at 05:20

## 2025-01-01 RX ADMIN — POLYETHYLENE GLYCOL 3350 17 GRAM(S): 17 POWDER, FOR SOLUTION ORAL at 06:08

## 2025-01-01 RX ADMIN — Medication 1 APPLICATION(S): at 11:46

## 2025-01-01 RX ADMIN — Medication 4 MILLIGRAM(S): at 05:42

## 2025-01-01 RX ADMIN — QUETIAPINE FUMARATE 25 MILLIGRAM(S): 25 TABLET ORAL at 18:12

## 2025-01-01 RX ADMIN — HALOPERIDOL 0.5 MILLIGRAM(S): 10 TABLET ORAL at 01:20

## 2025-01-01 RX ADMIN — GLYCOPYRROLATE 0.2 MILLIGRAM(S): 0.2 INJECTION INTRAMUSCULAR; INTRAVENOUS at 05:40

## 2025-01-01 RX ADMIN — QUETIAPINE FUMARATE 25 MILLIGRAM(S): 25 TABLET ORAL at 19:04

## 2025-01-01 RX ADMIN — TIMOLOL MALEATE 1 DROP(S): 6.8 SOLUTION OPHTHALMIC at 05:52

## 2025-01-01 RX ADMIN — BRIMONIDINE TARTRATE 1 DROP(S): 1.5 SOLUTION/ DROPS OPHTHALMIC at 18:44

## 2025-01-01 RX ADMIN — Medication 250 MILLIGRAM(S): at 05:16

## 2025-01-01 RX ADMIN — Medication 2 TABLET(S): at 21:36

## 2025-01-01 RX ADMIN — TAMSULOSIN HYDROCHLORIDE 0.8 MILLIGRAM(S): 0.4 CAPSULE ORAL at 21:36

## 2025-01-01 RX ADMIN — Medication 30 MILLIGRAM(S): at 05:39

## 2025-01-01 RX ADMIN — BRIMONIDINE TARTRATE 1 DROP(S): 1.5 SOLUTION/ DROPS OPHTHALMIC at 17:56

## 2025-01-01 RX ADMIN — QUETIAPINE FUMARATE 25 MILLIGRAM(S): 25 TABLET ORAL at 21:15

## 2025-01-01 RX ADMIN — Medication 30 MILLIGRAM(S): at 05:29

## 2025-01-01 RX ADMIN — LACTULOSE 20 GRAM(S): 10 SOLUTION ORAL at 14:50

## 2025-01-01 RX ADMIN — GLYCOPYRROLATE 0.2 MILLIGRAM(S): 0.2 INJECTION INTRAMUSCULAR; INTRAVENOUS at 18:57

## 2025-01-01 RX ADMIN — POLYETHYLENE GLYCOL 3350 17 GRAM(S): 17 POWDER, FOR SOLUTION ORAL at 05:47

## 2025-01-01 RX ADMIN — LISINOPRIL 10 MILLIGRAM(S): 5 TABLET ORAL at 05:17

## 2025-01-01 RX ADMIN — GLYCOPYRROLATE 0.2 MILLIGRAM(S): 0.2 INJECTION INTRAMUSCULAR; INTRAVENOUS at 17:39

## 2025-01-01 RX ADMIN — Medication 2 TABLET(S): at 21:25

## 2025-01-01 RX ADMIN — Medication 250 MILLIGRAM(S): at 05:51

## 2025-01-01 RX ADMIN — TIMOLOL MALEATE 1 DROP(S): 6.8 SOLUTION OPHTHALMIC at 05:14

## 2025-01-01 RX ADMIN — TIMOLOL MALEATE 1 DROP(S): 6.8 SOLUTION OPHTHALMIC at 05:58

## 2025-01-01 RX ADMIN — METOPROLOL SUCCINATE 25 MILLIGRAM(S): 50 TABLET, EXTENDED RELEASE ORAL at 06:13

## 2025-01-01 RX ADMIN — Medication 4 MILLIGRAM(S): at 22:50

## 2025-01-01 RX ADMIN — Medication 1 TABLET(S): at 11:09

## 2025-01-01 RX ADMIN — BRIMONIDINE TARTRATE 1 DROP(S): 1.5 SOLUTION/ DROPS OPHTHALMIC at 06:01

## 2025-01-01 RX ADMIN — Medication 2 MILLIGRAM(S): at 16:45

## 2025-01-01 RX ADMIN — BRIMONIDINE TARTRATE 1 DROP(S): 1.5 SOLUTION/ DROPS OPHTHALMIC at 17:48

## 2025-01-01 RX ADMIN — Medication 4 MILLIGRAM(S): at 17:38

## 2025-01-01 RX ADMIN — ENOXAPARIN SODIUM 80 MILLIGRAM(S): 100 INJECTION SUBCUTANEOUS at 17:38

## 2025-01-01 RX ADMIN — Medication 250 MILLIGRAM(S): at 05:29

## 2025-01-01 RX ADMIN — QUETIAPINE FUMARATE 25 MILLIGRAM(S): 25 TABLET ORAL at 00:01

## 2025-01-01 RX ADMIN — Medication 1 APPLICATION(S): at 12:31

## 2025-01-01 RX ADMIN — ENOXAPARIN SODIUM 40 MILLIGRAM(S): 100 INJECTION SUBCUTANEOUS at 17:56

## 2025-01-01 RX ADMIN — LACTULOSE 20 GRAM(S): 10 SOLUTION ORAL at 12:31

## 2025-01-01 RX ADMIN — TIMOLOL MALEATE 1 DROP(S): 6.8 SOLUTION OPHTHALMIC at 17:40

## 2025-01-01 RX ADMIN — ENOXAPARIN SODIUM 80 MILLIGRAM(S): 100 INJECTION SUBCUTANEOUS at 05:56

## 2025-01-01 RX ADMIN — ARIPIPRAZOLE 2.5 MILLIGRAM(S): 2 TABLET ORAL at 22:43

## 2025-01-01 RX ADMIN — Medication 1 APPLICATION(S): at 11:59

## 2025-01-01 RX ADMIN — POLYETHYLENE GLYCOL 3350 17 GRAM(S): 17 POWDER, FOR SOLUTION ORAL at 17:30

## 2025-01-01 RX ADMIN — POLYETHYLENE GLYCOL 3350 17 GRAM(S): 17 POWDER, FOR SOLUTION ORAL at 17:56

## 2025-01-01 RX ADMIN — METOPROLOL SUCCINATE 50 MILLIGRAM(S): 50 TABLET, EXTENDED RELEASE ORAL at 22:14

## 2025-01-01 RX ADMIN — Medication 4 MILLIGRAM(S): at 10:38

## 2025-01-01 RX ADMIN — Medication 10 MILLIGRAM(S): at 21:24

## 2025-01-01 RX ADMIN — BRIMONIDINE TARTRATE 1 DROP(S): 1.5 SOLUTION/ DROPS OPHTHALMIC at 05:20

## 2025-01-01 RX ADMIN — LATANOPROST PF 1 DROP(S): 0.05 SOLUTION/ DROPS OPHTHALMIC at 22:35

## 2025-01-01 RX ADMIN — TIMOLOL MALEATE 1 DROP(S): 6.8 SOLUTION OPHTHALMIC at 05:08

## 2025-01-01 RX ADMIN — Medication 2 TABLET(S): at 21:20

## 2025-01-01 RX ADMIN — Medication 500 MILLIGRAM(S): at 17:34

## 2025-01-01 RX ADMIN — TIMOLOL MALEATE 1 DROP(S): 6.8 SOLUTION OPHTHALMIC at 05:48

## 2025-01-01 RX ADMIN — Medication 250 MILLIGRAM(S): at 05:14

## 2025-01-01 RX ADMIN — ENOXAPARIN SODIUM 80 MILLIGRAM(S): 100 INJECTION SUBCUTANEOUS at 05:54

## 2025-01-01 RX ADMIN — Medication 1 APPLICATION(S): at 11:27

## 2025-01-01 RX ADMIN — POLYETHYLENE GLYCOL 3350 17 GRAM(S): 17 POWDER, FOR SOLUTION ORAL at 17:08

## 2025-01-01 RX ADMIN — Medication 1 APPLICATION(S): at 12:12

## 2025-01-01 RX ADMIN — Medication 1 TABLET(S): at 11:24

## 2025-01-01 RX ADMIN — TIMOLOL MALEATE 1 DROP(S): 6.8 SOLUTION OPHTHALMIC at 05:53

## 2025-01-01 RX ADMIN — Medication 4 MILLIGRAM(S): at 02:40

## 2025-01-01 RX ADMIN — Medication 250 MILLIGRAM(S): at 05:19

## 2025-01-01 RX ADMIN — LATANOPROST PF 1 DROP(S): 0.05 SOLUTION/ DROPS OPHTHALMIC at 22:07

## 2025-01-01 RX ADMIN — Medication 30 MILLIGRAM(S): at 05:58

## 2025-01-01 RX ADMIN — Medication 250 MILLIGRAM(S): at 05:31

## 2025-01-01 RX ADMIN — QUETIAPINE FUMARATE 25 MILLIGRAM(S): 25 TABLET ORAL at 17:22

## 2025-01-01 RX ADMIN — Medication 1 TABLET(S): at 11:48

## 2025-01-01 RX ADMIN — TIMOLOL MALEATE 1 DROP(S): 6.8 SOLUTION OPHTHALMIC at 18:05

## 2025-01-01 RX ADMIN — ENOXAPARIN SODIUM 80 MILLIGRAM(S): 100 INJECTION SUBCUTANEOUS at 17:41

## 2025-01-01 RX ADMIN — POLYETHYLENE GLYCOL 3350 17 GRAM(S): 17 POWDER, FOR SOLUTION ORAL at 18:05

## 2025-01-01 RX ADMIN — Medication 500 MILLIGRAM(S): at 17:11

## 2025-01-01 RX ADMIN — TIMOLOL MALEATE 1 DROP(S): 6.8 SOLUTION OPHTHALMIC at 18:12

## 2025-01-01 RX ADMIN — TAMSULOSIN HYDROCHLORIDE 0.8 MILLIGRAM(S): 0.4 CAPSULE ORAL at 21:24

## 2025-01-01 RX ADMIN — LATANOPROST PF 1 DROP(S): 0.05 SOLUTION/ DROPS OPHTHALMIC at 21:37

## 2025-01-01 RX ADMIN — POLYETHYLENE GLYCOL 3350 17 GRAM(S): 17 POWDER, FOR SOLUTION ORAL at 17:40

## 2025-01-01 RX ADMIN — TIMOLOL MALEATE 1 DROP(S): 6.8 SOLUTION OPHTHALMIC at 17:56

## 2025-01-01 RX ADMIN — Medication 200 MILLIGRAM(S): at 11:58

## 2025-01-01 RX ADMIN — POLYETHYLENE GLYCOL 3350 17 GRAM(S): 17 POWDER, FOR SOLUTION ORAL at 05:31

## 2025-01-01 RX ADMIN — Medication 500 MILLIGRAM(S): at 17:09

## 2025-01-01 RX ADMIN — Medication 500 MILLIGRAM(S): at 17:42

## 2025-01-01 RX ADMIN — Medication 4 MILLIGRAM(S): at 06:38

## 2025-01-01 RX ADMIN — TAMSULOSIN HYDROCHLORIDE 0.4 MILLIGRAM(S): 0.4 CAPSULE ORAL at 11:42

## 2025-01-01 RX ADMIN — Medication 200 MILLIGRAM(S): at 11:24

## 2025-01-01 RX ADMIN — LATANOPROST PF 1 DROP(S): 0.05 SOLUTION/ DROPS OPHTHALMIC at 21:14

## 2025-01-01 RX ADMIN — TAMSULOSIN HYDROCHLORIDE 0.8 MILLIGRAM(S): 0.4 CAPSULE ORAL at 21:25

## 2025-01-01 RX ADMIN — GLYCOPYRROLATE 0.2 MILLIGRAM(S): 0.2 INJECTION INTRAMUSCULAR; INTRAVENOUS at 05:34

## 2025-01-01 RX ADMIN — Medication 2 TABLET(S): at 21:08

## 2025-01-01 RX ADMIN — TAMSULOSIN HYDROCHLORIDE 0.8 MILLIGRAM(S): 0.4 CAPSULE ORAL at 21:16

## 2025-01-01 RX ADMIN — TIMOLOL MALEATE 1 DROP(S): 6.8 SOLUTION OPHTHALMIC at 17:44

## 2025-01-01 RX ADMIN — TIMOLOL MALEATE 1 DROP(S): 6.8 SOLUTION OPHTHALMIC at 05:50

## 2025-01-01 RX ADMIN — LISINOPRIL 10 MILLIGRAM(S): 5 TABLET ORAL at 05:30

## 2025-01-01 RX ADMIN — TAMSULOSIN HYDROCHLORIDE 0.8 MILLIGRAM(S): 0.4 CAPSULE ORAL at 22:09

## 2025-01-01 RX ADMIN — QUETIAPINE FUMARATE 25 MILLIGRAM(S): 25 TABLET ORAL at 18:44

## 2025-01-01 RX ADMIN — Medication 500 MILLIGRAM(S): at 17:40

## 2025-01-01 RX ADMIN — TIMOLOL MALEATE 1 DROP(S): 6.8 SOLUTION OPHTHALMIC at 17:48

## 2025-01-01 RX ADMIN — Medication 4 MILLIGRAM(S): at 11:34

## 2025-01-01 RX ADMIN — POLYETHYLENE GLYCOL 3350 17 GRAM(S): 17 POWDER, FOR SOLUTION ORAL at 17:41

## 2025-01-01 RX ADMIN — BRIMONIDINE TARTRATE 1 DROP(S): 1.5 SOLUTION/ DROPS OPHTHALMIC at 05:53

## 2025-01-01 RX ADMIN — BRIMONIDINE TARTRATE 1 DROP(S): 1.5 SOLUTION/ DROPS OPHTHALMIC at 06:00

## 2025-01-01 RX ADMIN — POLYETHYLENE GLYCOL 3350 17 GRAM(S): 17 POWDER, FOR SOLUTION ORAL at 05:17

## 2025-01-01 RX ADMIN — Medication 10 MILLIGRAM(S): at 22:00

## 2025-01-01 RX ADMIN — TIMOLOL MALEATE 1 DROP(S): 6.8 SOLUTION OPHTHALMIC at 17:57

## 2025-01-01 RX ADMIN — METOPROLOL SUCCINATE 25 MILLIGRAM(S): 50 TABLET, EXTENDED RELEASE ORAL at 05:57

## 2025-01-01 RX ADMIN — POLYETHYLENE GLYCOL 3350 17 GRAM(S): 17 POWDER, FOR SOLUTION ORAL at 06:20

## 2025-01-01 RX ADMIN — BRIMONIDINE TARTRATE 1 DROP(S): 1.5 SOLUTION/ DROPS OPHTHALMIC at 06:21

## 2025-01-01 RX ADMIN — LISINOPRIL 10 MILLIGRAM(S): 5 TABLET ORAL at 06:08

## 2025-01-01 RX ADMIN — LATANOPROST PF 1 DROP(S): 0.05 SOLUTION/ DROPS OPHTHALMIC at 05:33

## 2025-01-01 RX ADMIN — TAMSULOSIN HYDROCHLORIDE 0.8 MILLIGRAM(S): 0.4 CAPSULE ORAL at 22:02

## 2025-01-01 RX ADMIN — Medication 1 APPLICATION(S): at 13:02

## 2025-01-01 RX ADMIN — Medication 4 MILLIGRAM(S): at 00:11

## 2025-01-01 RX ADMIN — TAMSULOSIN HYDROCHLORIDE 0.8 MILLIGRAM(S): 0.4 CAPSULE ORAL at 21:08

## 2025-01-01 RX ADMIN — Medication 2 TABLET(S): at 22:09

## 2025-01-01 RX ADMIN — BRIMONIDINE TARTRATE 1 DROP(S): 1.5 SOLUTION/ DROPS OPHTHALMIC at 05:48

## 2025-01-01 RX ADMIN — GLYCOPYRROLATE 0.2 MILLIGRAM(S): 0.2 INJECTION INTRAMUSCULAR; INTRAVENOUS at 00:42

## 2025-01-01 RX ADMIN — Medication 10 MILLIGRAM(S): at 21:36

## 2025-01-01 RX ADMIN — TAMSULOSIN HYDROCHLORIDE 0.8 MILLIGRAM(S): 0.4 CAPSULE ORAL at 21:15

## 2025-01-01 RX ADMIN — GABAPENTIN 100 MILLIGRAM(S): 400 CAPSULE ORAL at 13:52

## 2025-01-01 RX ADMIN — TIMOLOL MALEATE 1 DROP(S): 6.8 SOLUTION OPHTHALMIC at 17:33

## 2025-01-01 RX ADMIN — ENOXAPARIN SODIUM 80 MILLIGRAM(S): 100 INJECTION SUBCUTANEOUS at 05:51

## 2025-01-01 RX ADMIN — ARIPIPRAZOLE 2.5 MILLIGRAM(S): 2 TABLET ORAL at 05:51

## 2025-01-01 RX ADMIN — Medication 1 APPLICATION(S): at 12:44

## 2025-01-01 RX ADMIN — POLYETHYLENE GLYCOL 3350 17 GRAM(S): 17 POWDER, FOR SOLUTION ORAL at 17:48

## 2025-01-01 RX ADMIN — Medication 1 TABLET(S): at 12:18

## 2025-01-01 RX ADMIN — TIMOLOL MALEATE 1 DROP(S): 6.8 SOLUTION OPHTHALMIC at 19:02

## 2025-01-01 RX ADMIN — TIMOLOL MALEATE 1 DROP(S): 6.8 SOLUTION OPHTHALMIC at 17:11

## 2025-01-01 RX ADMIN — POLYETHYLENE GLYCOL 3350 17 GRAM(S): 17 POWDER, FOR SOLUTION ORAL at 13:53

## 2025-01-01 RX ADMIN — LATANOPROST PF 1 DROP(S): 0.05 SOLUTION/ DROPS OPHTHALMIC at 21:02

## 2025-01-01 RX ADMIN — Medication 4 MILLIGRAM(S): at 05:34

## 2025-01-01 RX ADMIN — LISINOPRIL 10 MILLIGRAM(S): 5 TABLET ORAL at 06:20

## 2025-01-01 RX ADMIN — Medication 10 MILLIGRAM(S): at 21:23

## 2025-01-01 RX ADMIN — ARIPIPRAZOLE 2 MILLIGRAM(S): 2 TABLET ORAL at 21:34

## 2025-01-01 RX ADMIN — METOPROLOL SUCCINATE 25 MILLIGRAM(S): 50 TABLET, EXTENDED RELEASE ORAL at 05:16

## 2025-01-01 RX ADMIN — Medication 1 TABLET(S): at 13:06

## 2025-01-01 RX ADMIN — ENOXAPARIN SODIUM 80 MILLIGRAM(S): 100 INJECTION SUBCUTANEOUS at 19:04

## 2025-01-01 RX ADMIN — POLYETHYLENE GLYCOL 3350 17 GRAM(S): 17 POWDER, FOR SOLUTION ORAL at 06:27

## 2025-01-01 RX ADMIN — Medication 1 APPLICATION(S): at 12:17

## 2025-01-01 RX ADMIN — BRIMONIDINE TARTRATE 1 DROP(S): 1.5 SOLUTION/ DROPS OPHTHALMIC at 17:45

## 2025-01-01 RX ADMIN — POLYETHYLENE GLYCOL 3350 17 GRAM(S): 17 POWDER, FOR SOLUTION ORAL at 05:52

## 2025-01-01 RX ADMIN — TIMOLOL MALEATE 1 DROP(S): 6.8 SOLUTION OPHTHALMIC at 05:30

## 2025-01-01 RX ADMIN — ENOXAPARIN SODIUM 80 MILLIGRAM(S): 100 INJECTION SUBCUTANEOUS at 05:59

## 2025-01-01 RX ADMIN — Medication 10 MILLIGRAM(S): at 22:02

## 2025-01-01 RX ADMIN — Medication 250 MILLIGRAM(S): at 06:20

## 2025-01-01 RX ADMIN — METOPROLOL SUCCINATE 25 MILLIGRAM(S): 50 TABLET, EXTENDED RELEASE ORAL at 06:20

## 2025-01-01 RX ADMIN — Medication 250 MILLIGRAM(S): at 14:44

## 2025-01-01 RX ADMIN — BRIMONIDINE TARTRATE 1 DROP(S): 1.5 SOLUTION/ DROPS OPHTHALMIC at 18:05

## 2025-01-01 RX ADMIN — GLYCOPYRROLATE 0.2 MILLIGRAM(S): 0.2 INJECTION INTRAMUSCULAR; INTRAVENOUS at 18:38

## 2025-01-01 RX ADMIN — Medication 30 MILLIGRAM(S): at 05:51

## 2025-01-01 RX ADMIN — BRIMONIDINE TARTRATE 1 DROP(S): 1.5 SOLUTION/ DROPS OPHTHALMIC at 18:00

## 2025-01-01 RX ADMIN — Medication 4 MILLIGRAM(S): at 07:38

## 2025-01-01 RX ADMIN — Medication 4 MILLIGRAM(S): at 18:38

## 2025-01-01 RX ADMIN — Medication 4 MILLIGRAM(S): at 21:22

## 2025-01-01 RX ADMIN — BRIMONIDINE TARTRATE 1 DROP(S): 1.5 SOLUTION/ DROPS OPHTHALMIC at 05:52

## 2025-01-01 RX ADMIN — BRIMONIDINE TARTRATE 1 DROP(S): 1.5 SOLUTION/ DROPS OPHTHALMIC at 05:34

## 2025-01-01 RX ADMIN — Medication 4 MILLIGRAM(S): at 19:07

## 2025-01-01 RX ADMIN — BRIMONIDINE TARTRATE 1 DROP(S): 1.5 SOLUTION/ DROPS OPHTHALMIC at 17:42

## 2025-01-01 RX ADMIN — Medication 1 APPLICATION(S): at 11:29

## 2025-01-01 RX ADMIN — BRIMONIDINE TARTRATE 1 DROP(S): 1.5 SOLUTION/ DROPS OPHTHALMIC at 05:32

## 2025-01-01 RX ADMIN — GLYCOPYRROLATE 0.2 MILLIGRAM(S): 0.2 INJECTION INTRAMUSCULAR; INTRAVENOUS at 06:38

## 2025-01-01 RX ADMIN — BRIMONIDINE TARTRATE 1 DROP(S): 1.5 SOLUTION/ DROPS OPHTHALMIC at 17:33

## 2025-01-01 RX ADMIN — GLYCOPYRROLATE 0.2 MILLIGRAM(S): 0.2 INJECTION INTRAMUSCULAR; INTRAVENOUS at 13:01

## 2025-01-01 RX ADMIN — ENOXAPARIN SODIUM 80 MILLIGRAM(S): 100 INJECTION SUBCUTANEOUS at 17:40

## 2025-01-01 RX ADMIN — BRIMONIDINE TARTRATE 1 DROP(S): 1.5 SOLUTION/ DROPS OPHTHALMIC at 17:31

## 2025-01-01 RX ADMIN — Medication 500 MILLIGRAM(S): at 19:04

## 2025-01-01 RX ADMIN — POLYETHYLENE GLYCOL 3350 17 GRAM(S): 17 POWDER, FOR SOLUTION ORAL at 05:35

## 2025-01-01 RX ADMIN — METOPROLOL SUCCINATE 25 MILLIGRAM(S): 50 TABLET, EXTENDED RELEASE ORAL at 06:08

## 2025-01-01 RX ADMIN — Medication 1 APPLICATION(S): at 12:36

## 2025-01-01 RX ADMIN — TIMOLOL MALEATE 1 DROP(S): 6.8 SOLUTION OPHTHALMIC at 05:18

## 2025-01-01 RX ADMIN — TIMOLOL MALEATE 1 DROP(S): 6.8 SOLUTION OPHTHALMIC at 06:00

## 2025-01-01 RX ADMIN — LATANOPROST PF 1 DROP(S): 0.05 SOLUTION/ DROPS OPHTHALMIC at 21:48

## 2025-01-01 RX ADMIN — Medication 500 MILLIGRAM(S): at 17:41

## 2025-01-01 RX ADMIN — POLYETHYLENE GLYCOL 3350 17 GRAM(S): 17 POWDER, FOR SOLUTION ORAL at 05:29

## 2025-01-01 RX ADMIN — QUETIAPINE FUMARATE 25 MILLIGRAM(S): 25 TABLET ORAL at 17:44

## 2025-01-01 RX ADMIN — GABAPENTIN 100 MILLIGRAM(S): 400 CAPSULE ORAL at 22:06

## 2025-01-01 RX ADMIN — ENOXAPARIN SODIUM 80 MILLIGRAM(S): 100 INJECTION SUBCUTANEOUS at 17:48

## 2025-01-01 RX ADMIN — Medication 1 APPLICATION(S): at 11:44

## 2025-01-01 RX ADMIN — Medication 1 APPLICATION(S): at 11:02

## 2025-01-01 RX ADMIN — BRIMONIDINE TARTRATE 1 DROP(S): 1.5 SOLUTION/ DROPS OPHTHALMIC at 05:30

## 2025-01-01 RX ADMIN — LISINOPRIL 10 MILLIGRAM(S): 5 TABLET ORAL at 05:50

## 2025-01-01 RX ADMIN — TIMOLOL MALEATE 1 DROP(S): 6.8 SOLUTION OPHTHALMIC at 17:42

## 2025-01-01 RX ADMIN — LATANOPROST PF 1 DROP(S): 0.05 SOLUTION/ DROPS OPHTHALMIC at 21:27

## 2025-01-01 RX ADMIN — ENOXAPARIN SODIUM 40 MILLIGRAM(S): 100 INJECTION SUBCUTANEOUS at 17:44

## 2025-01-01 RX ADMIN — QUETIAPINE FUMARATE 25 MILLIGRAM(S): 25 TABLET ORAL at 17:34

## 2025-01-01 RX ADMIN — BRIMONIDINE TARTRATE 1 DROP(S): 1.5 SOLUTION/ DROPS OPHTHALMIC at 17:11

## 2025-01-01 RX ADMIN — Medication 2 TABLET(S): at 21:24

## 2025-01-01 RX ADMIN — Medication 250 MILLIGRAM(S): at 06:10

## 2025-01-01 RX ADMIN — BRIMONIDINE TARTRATE 1 DROP(S): 1.5 SOLUTION/ DROPS OPHTHALMIC at 17:41

## 2025-01-01 RX ADMIN — TIMOLOL MALEATE 1 DROP(S): 6.8 SOLUTION OPHTHALMIC at 18:16

## 2025-01-01 RX ADMIN — ENOXAPARIN SODIUM 80 MILLIGRAM(S): 100 INJECTION SUBCUTANEOUS at 17:09

## 2025-01-01 RX ADMIN — Medication 1 MILLIGRAM(S): at 05:51

## 2025-01-01 RX ADMIN — ENOXAPARIN SODIUM 80 MILLIGRAM(S): 100 INJECTION SUBCUTANEOUS at 17:30

## 2025-01-01 RX ADMIN — BRIMONIDINE TARTRATE 1 DROP(S): 1.5 SOLUTION/ DROPS OPHTHALMIC at 05:28

## 2025-01-01 RX ADMIN — ENOXAPARIN SODIUM 80 MILLIGRAM(S): 100 INJECTION SUBCUTANEOUS at 05:33

## 2025-01-01 RX ADMIN — FUROSEMIDE 10 MILLIGRAM(S): 10 INJECTION INTRAMUSCULAR; INTRAVENOUS at 06:26

## 2025-01-01 RX ADMIN — Medication 40 MILLIEQUIVALENT(S): at 13:21

## 2025-01-01 RX ADMIN — Medication 4 MILLIGRAM(S): at 22:28

## 2025-01-01 RX ADMIN — LATANOPROST PF 1 DROP(S): 0.05 SOLUTION/ DROPS OPHTHALMIC at 22:00

## 2025-01-01 RX ADMIN — ENOXAPARIN SODIUM 80 MILLIGRAM(S): 100 INJECTION SUBCUTANEOUS at 06:08

## 2025-01-01 RX ADMIN — LISINOPRIL 10 MILLIGRAM(S): 5 TABLET ORAL at 05:31

## 2025-01-01 RX ADMIN — Medication 200 MILLIGRAM(S): at 11:09

## 2025-01-01 RX ADMIN — ENOXAPARIN SODIUM 80 MILLIGRAM(S): 100 INJECTION SUBCUTANEOUS at 05:50

## 2025-01-01 RX ADMIN — ENOXAPARIN SODIUM 80 MILLIGRAM(S): 100 INJECTION SUBCUTANEOUS at 06:20

## 2025-01-01 RX ADMIN — ENOXAPARIN SODIUM 80 MILLIGRAM(S): 100 INJECTION SUBCUTANEOUS at 17:34

## 2025-01-01 RX ADMIN — Medication 2 TABLET(S): at 21:16

## 2025-01-01 RX ADMIN — BRIMONIDINE TARTRATE 1 DROP(S): 1.5 SOLUTION/ DROPS OPHTHALMIC at 19:01

## 2025-01-01 RX ADMIN — GABAPENTIN 100 MILLIGRAM(S): 400 CAPSULE ORAL at 05:47

## 2025-01-01 RX ADMIN — Medication 500 MILLIGRAM(S): at 18:12

## 2025-01-01 RX ADMIN — TIMOLOL MALEATE 1 DROP(S): 6.8 SOLUTION OPHTHALMIC at 05:34

## 2025-01-01 RX ADMIN — BRIMONIDINE TARTRATE 1 DROP(S): 1.5 SOLUTION/ DROPS OPHTHALMIC at 06:27

## 2025-01-01 RX ADMIN — Medication 30 MILLIGRAM(S): at 05:14

## 2025-01-01 RX ADMIN — TIMOLOL MALEATE 1 DROP(S): 6.8 SOLUTION OPHTHALMIC at 17:31

## 2025-01-01 RX ADMIN — BRIMONIDINE TARTRATE 1 DROP(S): 1.5 SOLUTION/ DROPS OPHTHALMIC at 18:13

## 2025-01-01 RX ADMIN — Medication 4 MILLIGRAM(S): at 11:29

## 2025-01-01 RX ADMIN — POLYETHYLENE GLYCOL 3350 17 GRAM(S): 17 POWDER, FOR SOLUTION ORAL at 05:49

## 2025-01-01 RX ADMIN — Medication 250 MILLIGRAM(S): at 05:50

## 2025-01-01 RX ADMIN — CEFTRIAXONE 100 MILLIGRAM(S): 500 INJECTION, POWDER, FOR SOLUTION INTRAMUSCULAR; INTRAVENOUS at 02:40

## 2025-01-01 RX ADMIN — BRIMONIDINE TARTRATE 1 DROP(S): 1.5 SOLUTION/ DROPS OPHTHALMIC at 17:57

## 2025-01-01 RX ADMIN — Medication 250 MILLIGRAM(S): at 05:56

## 2025-01-01 RX ADMIN — Medication 500 MILLIGRAM(S): at 17:44

## 2025-01-01 RX ADMIN — LATANOPROST PF 1 DROP(S): 0.05 SOLUTION/ DROPS OPHTHALMIC at 22:19

## 2025-01-01 RX ADMIN — LATANOPROST PF 1 DROP(S): 0.05 SOLUTION/ DROPS OPHTHALMIC at 21:36

## 2025-01-01 RX ADMIN — ENOXAPARIN SODIUM 80 MILLIGRAM(S): 100 INJECTION SUBCUTANEOUS at 05:14

## 2025-01-01 RX ADMIN — Medication 10 MILLIGRAM(S): at 21:09

## 2025-01-01 RX ADMIN — Medication 2 TABLET(S): at 21:15

## 2025-01-01 RX ADMIN — TIMOLOL MALEATE 1 DROP(S): 6.8 SOLUTION OPHTHALMIC at 05:28

## 2025-01-01 RX ADMIN — ENOXAPARIN SODIUM 80 MILLIGRAM(S): 100 INJECTION SUBCUTANEOUS at 17:11

## 2025-01-01 RX ADMIN — Medication 200 MILLIGRAM(S): at 13:06

## 2025-01-01 RX ADMIN — Medication 1 APPLICATION(S): at 11:39

## 2025-01-01 RX ADMIN — TIMOLOL MALEATE 1 DROP(S): 6.8 SOLUTION OPHTHALMIC at 06:21

## 2025-01-01 RX ADMIN — HALOPERIDOL 0.5 MILLIGRAM(S): 10 TABLET ORAL at 21:03

## 2025-01-01 RX ADMIN — Medication 1 APPLICATION(S): at 12:30

## 2025-01-01 RX ADMIN — ENOXAPARIN SODIUM 80 MILLIGRAM(S): 100 INJECTION SUBCUTANEOUS at 18:12

## 2025-01-01 RX ADMIN — ENOXAPARIN SODIUM 80 MILLIGRAM(S): 100 INJECTION SUBCUTANEOUS at 18:05

## 2025-01-01 RX ADMIN — BRIMONIDINE TARTRATE 1 DROP(S): 1.5 SOLUTION/ DROPS OPHTHALMIC at 05:18

## 2025-01-01 RX ADMIN — Medication 4 MILLIGRAM(S): at 02:22

## 2025-01-01 RX ADMIN — POLYETHYLENE GLYCOL 3350 17 GRAM(S): 17 POWDER, FOR SOLUTION ORAL at 19:02

## 2025-01-01 RX ADMIN — LACTULOSE 20 GRAM(S): 10 SOLUTION ORAL at 10:32

## 2025-01-01 RX ADMIN — Medication 4 MILLIGRAM(S): at 02:14

## 2025-01-01 RX ADMIN — METOPROLOL SUCCINATE 25 MILLIGRAM(S): 50 TABLET, EXTENDED RELEASE ORAL at 05:30

## 2025-01-01 RX ADMIN — Medication 2 TABLET(S): at 22:08

## 2025-01-01 RX ADMIN — Medication 1 TABLET(S): at 13:02

## 2025-01-01 RX ADMIN — Medication 500 MILLIGRAM(S): at 17:22

## 2025-01-01 RX ADMIN — Medication 4 MILLIGRAM(S): at 11:15

## 2025-01-01 RX ADMIN — Medication 1 APPLICATION(S): at 12:25

## 2025-01-01 RX ADMIN — Medication 250 MILLIGRAM(S): at 06:28

## 2025-01-01 RX ADMIN — Medication 1 APPLICATION(S): at 12:00

## 2025-01-01 RX ADMIN — ENOXAPARIN SODIUM 80 MILLIGRAM(S): 100 INJECTION SUBCUTANEOUS at 08:20

## 2025-01-01 RX ADMIN — BRIMONIDINE TARTRATE 1 DROP(S): 1.5 SOLUTION/ DROPS OPHTHALMIC at 18:15

## 2025-01-01 RX ADMIN — Medication 200 MILLIGRAM(S): at 11:48

## 2025-01-01 RX ADMIN — Medication 250 MILLIGRAM(S): at 05:28

## 2025-01-01 RX ADMIN — GABAPENTIN 100 MILLIGRAM(S): 400 CAPSULE ORAL at 11:42

## 2025-01-01 RX ADMIN — TIMOLOL MALEATE 1 DROP(S): 6.8 SOLUTION OPHTHALMIC at 05:39

## 2025-01-01 RX ADMIN — BRIMONIDINE TARTRATE 1 DROP(S): 1.5 SOLUTION/ DROPS OPHTHALMIC at 05:09

## 2025-01-01 RX ADMIN — TIMOLOL MALEATE 1 DROP(S): 6.8 SOLUTION OPHTHALMIC at 05:32

## 2025-01-01 RX ADMIN — TIMOLOL MALEATE 1 DROP(S): 6.8 SOLUTION OPHTHALMIC at 18:44

## 2025-01-01 RX ADMIN — BRIMONIDINE TARTRATE 1 DROP(S): 1.5 SOLUTION/ DROPS OPHTHALMIC at 17:40

## 2025-01-01 RX ADMIN — Medication 4 MILLIGRAM(S): at 06:10

## 2025-01-01 RX ADMIN — LISINOPRIL 10 MILLIGRAM(S): 5 TABLET ORAL at 05:59

## 2025-01-01 RX ADMIN — GLYCOPYRROLATE 0.2 MILLIGRAM(S): 0.2 INJECTION INTRAMUSCULAR; INTRAVENOUS at 11:28

## 2025-01-01 RX ADMIN — LATANOPROST PF 1 DROP(S): 0.05 SOLUTION/ DROPS OPHTHALMIC at 21:24

## 2025-01-01 RX ADMIN — Medication 500 MILLIGRAM(S): at 18:04

## 2025-01-01 RX ADMIN — Medication 10 MILLIGRAM(S): at 21:25

## 2025-01-01 RX ADMIN — TAMSULOSIN HYDROCHLORIDE 0.8 MILLIGRAM(S): 0.4 CAPSULE ORAL at 22:08

## 2025-01-01 RX ADMIN — BRIMONIDINE TARTRATE 1 DROP(S): 1.5 SOLUTION/ DROPS OPHTHALMIC at 06:06

## 2025-01-01 RX ADMIN — Medication 1 APPLICATION(S): at 11:33

## 2025-01-01 RX ADMIN — ENOXAPARIN SODIUM 80 MILLIGRAM(S): 100 INJECTION SUBCUTANEOUS at 05:49

## 2025-01-01 RX ADMIN — ARIPIPRAZOLE 2.5 MILLIGRAM(S): 2 TABLET ORAL at 01:31

## 2025-01-01 RX ADMIN — Medication 40 MILLIEQUIVALENT(S): at 13:31

## 2025-01-01 RX ADMIN — ENOXAPARIN SODIUM 80 MILLIGRAM(S): 100 INJECTION SUBCUTANEOUS at 05:08

## 2025-01-01 RX ADMIN — ENOXAPARIN SODIUM 80 MILLIGRAM(S): 100 INJECTION SUBCUTANEOUS at 06:27

## 2025-01-01 RX ADMIN — Medication 2 MILLIGRAM(S): at 12:42

## 2025-01-01 RX ADMIN — LATANOPROST PF 1 DROP(S): 0.05 SOLUTION/ DROPS OPHTHALMIC at 21:16

## 2025-01-01 RX ADMIN — TAMSULOSIN HYDROCHLORIDE 0.8 MILLIGRAM(S): 0.4 CAPSULE ORAL at 21:47

## 2025-01-01 RX ADMIN — QUETIAPINE FUMARATE 25 MILLIGRAM(S): 25 TABLET ORAL at 17:42

## 2025-01-01 RX ADMIN — Medication 10 MILLIGRAM(S): at 22:08

## 2025-01-01 RX ADMIN — Medication 2 TABLET(S): at 22:07

## 2025-01-01 RX ADMIN — POLYETHYLENE GLYCOL 3350 17 GRAM(S): 17 POWDER, FOR SOLUTION ORAL at 17:34

## 2025-01-01 RX ADMIN — TIMOLOL MALEATE 1 DROP(S): 6.8 SOLUTION OPHTHALMIC at 05:20

## 2025-01-01 RX ADMIN — Medication 0.5 MILLIGRAM(S): at 00:23

## 2025-01-01 RX ADMIN — QUETIAPINE FUMARATE 25 MILLIGRAM(S): 25 TABLET ORAL at 17:40

## 2025-01-01 RX ADMIN — METOPROLOL SUCCINATE 25 MILLIGRAM(S): 50 TABLET, EXTENDED RELEASE ORAL at 05:52

## 2025-01-01 RX ADMIN — POLYETHYLENE GLYCOL 3350 17 GRAM(S): 17 POWDER, FOR SOLUTION ORAL at 17:44

## 2025-01-01 RX ADMIN — Medication 500 MILLIGRAM(S): at 17:50

## 2025-01-01 RX ADMIN — Medication 4 MILLIGRAM(S): at 01:11

## 2025-01-01 RX ADMIN — POLYETHYLENE GLYCOL 3350 17 GRAM(S): 17 POWDER, FOR SOLUTION ORAL at 05:19

## 2025-01-01 RX ADMIN — Medication 200 MILLIGRAM(S): at 12:18

## 2025-01-01 RX ADMIN — FUROSEMIDE 40 MILLIGRAM(S): 10 INJECTION INTRAMUSCULAR; INTRAVENOUS at 17:44

## 2025-01-01 RX ADMIN — Medication 10 MILLIGRAM(S): at 21:15

## 2025-01-01 RX ADMIN — LISINOPRIL 10 MILLIGRAM(S): 5 TABLET ORAL at 05:20

## 2025-01-01 RX ADMIN — Medication 250 MILLIGRAM(S): at 05:07

## 2025-01-01 RX ADMIN — BRIMONIDINE TARTRATE 1 DROP(S): 1.5 SOLUTION/ DROPS OPHTHALMIC at 17:09

## 2025-01-01 RX ADMIN — Medication 4 MILLIGRAM(S): at 14:33

## 2025-01-01 RX ADMIN — ENOXAPARIN SODIUM 40 MILLIGRAM(S): 100 INJECTION SUBCUTANEOUS at 18:07

## 2025-01-01 RX ADMIN — POLYETHYLENE GLYCOL 3350 17 GRAM(S): 17 POWDER, FOR SOLUTION ORAL at 17:11

## 2025-01-01 RX ADMIN — POLYETHYLENE GLYCOL 3350 17 GRAM(S): 17 POWDER, FOR SOLUTION ORAL at 17:45

## 2025-01-01 RX ADMIN — GLYCOPYRROLATE 0.2 MILLIGRAM(S): 0.2 INJECTION INTRAMUSCULAR; INTRAVENOUS at 23:17

## 2025-01-01 RX ADMIN — FUROSEMIDE 10 MILLIGRAM(S): 10 INJECTION INTRAMUSCULAR; INTRAVENOUS at 05:56

## 2025-01-01 RX ADMIN — ENOXAPARIN SODIUM 80 MILLIGRAM(S): 100 INJECTION SUBCUTANEOUS at 17:22

## 2025-01-01 RX ADMIN — BRIMONIDINE TARTRATE 1 DROP(S): 1.5 SOLUTION/ DROPS OPHTHALMIC at 05:50

## 2025-01-01 RX ADMIN — TIMOLOL MALEATE 1 DROP(S): 6.8 SOLUTION OPHTHALMIC at 17:45

## 2025-01-01 RX ADMIN — BRIMONIDINE TARTRATE 1 DROP(S): 1.5 SOLUTION/ DROPS OPHTHALMIC at 05:40

## 2025-01-01 RX ADMIN — TAMSULOSIN HYDROCHLORIDE 0.8 MILLIGRAM(S): 0.4 CAPSULE ORAL at 22:07

## 2025-01-01 RX ADMIN — Medication 2 MILLIGRAM(S): at 14:12

## 2025-01-01 RX ADMIN — METOPROLOL SUCCINATE 25 MILLIGRAM(S): 50 TABLET, EXTENDED RELEASE ORAL at 05:31

## 2025-01-01 RX ADMIN — Medication 500 MILLILITER(S): at 01:15

## 2025-01-01 RX ADMIN — LISINOPRIL 10 MILLIGRAM(S): 5 TABLET ORAL at 05:57

## 2025-01-01 RX ADMIN — HALOPERIDOL 0.5 MILLIGRAM(S): 10 TABLET ORAL at 16:55

## 2025-01-01 RX ADMIN — BRIMONIDINE TARTRATE 1 DROP(S): 1.5 SOLUTION/ DROPS OPHTHALMIC at 05:14

## 2025-01-01 RX ADMIN — GLYCOPYRROLATE 0.2 MILLIGRAM(S): 0.2 INJECTION INTRAMUSCULAR; INTRAVENOUS at 12:12

## 2025-01-01 RX ADMIN — GLYCOPYRROLATE 0.2 MILLIGRAM(S): 0.2 INJECTION INTRAMUSCULAR; INTRAVENOUS at 00:09

## 2025-01-01 RX ADMIN — TIMOLOL MALEATE 1 DROP(S): 6.8 SOLUTION OPHTHALMIC at 06:06

## 2025-01-01 RX ADMIN — Medication 1 APPLICATION(S): at 13:09

## 2025-01-01 RX ADMIN — Medication 1 MILLIGRAM(S): at 17:39

## 2025-01-01 RX ADMIN — ENOXAPARIN SODIUM 80 MILLIGRAM(S): 100 INJECTION SUBCUTANEOUS at 05:19

## 2025-01-01 RX ADMIN — TIMOLOL MALEATE 1 DROP(S): 6.8 SOLUTION OPHTHALMIC at 17:10

## 2025-01-01 RX ADMIN — Medication 1 APPLICATION(S): at 13:01

## 2025-01-01 RX ADMIN — TIMOLOL MALEATE 1 DROP(S): 6.8 SOLUTION OPHTHALMIC at 06:28

## 2025-01-01 RX ADMIN — Medication 40 MILLIEQUIVALENT(S): at 14:42

## 2025-01-01 RX ADMIN — QUETIAPINE FUMARATE 25 MILLIGRAM(S): 25 TABLET ORAL at 17:50

## 2025-01-01 RX ADMIN — TIMOLOL MALEATE 1 DROP(S): 6.8 SOLUTION OPHTHALMIC at 17:39

## 2025-01-01 RX ADMIN — Medication 250 MILLIGRAM(S): at 05:59

## 2025-01-01 RX ADMIN — Medication 10 MILLIGRAM(S): at 21:16

## 2025-01-01 RX ADMIN — Medication 500 MILLIGRAM(S): at 18:44

## 2025-01-01 RX ADMIN — Medication 1 TABLET(S): at 11:59

## 2025-01-01 RX ADMIN — Medication 30 MILLIGRAM(S): at 05:47

## 2025-01-01 RX ADMIN — LATANOPROST PF 1 DROP(S): 0.05 SOLUTION/ DROPS OPHTHALMIC at 22:15

## 2025-01-01 RX ADMIN — LATANOPROST PF 1 DROP(S): 0.05 SOLUTION/ DROPS OPHTHALMIC at 22:03

## 2025-01-01 RX ADMIN — Medication 2 TABLET(S): at 22:02

## 2025-01-01 RX ADMIN — Medication 10 MILLIGRAM(S): at 22:10

## 2025-01-01 RX ADMIN — TAMSULOSIN HYDROCHLORIDE 0.4 MILLIGRAM(S): 0.4 CAPSULE ORAL at 22:22

## 2025-04-07 ENCOUNTER — OUTPATIENT (OUTPATIENT)
Dept: OUTPATIENT SERVICES | Facility: HOSPITAL | Age: 89
LOS: 1 days | End: 2025-04-07
Payer: MEDICARE

## 2025-04-07 DIAGNOSIS — R07.82 INTERCOSTAL PAIN: ICD-10-CM

## 2025-04-07 PROCEDURE — 71110 X-RAY EXAM RIBS BIL 3 VIEWS: CPT

## 2025-04-07 PROCEDURE — 71110 X-RAY EXAM RIBS BIL 3 VIEWS: CPT | Mod: 26

## 2025-04-08 DIAGNOSIS — R07.82 INTERCOSTAL PAIN: ICD-10-CM

## 2025-04-18 ENCOUNTER — EMERGENCY (EMERGENCY)
Facility: HOSPITAL | Age: 89
LOS: 0 days | Discharge: HOME | End: 2025-04-18
Attending: STUDENT IN AN ORGANIZED HEALTH CARE EDUCATION/TRAINING PROGRAM
Payer: MEDICARE

## 2025-04-18 VITALS
SYSTOLIC BLOOD PRESSURE: 154 MMHG | HEART RATE: 92 BPM | TEMPERATURE: 98 F | DIASTOLIC BLOOD PRESSURE: 85 MMHG | OXYGEN SATURATION: 99 % | RESPIRATION RATE: 24 BRPM
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
## 2025-04-18 DIAGNOSIS — M19.90 UNSPECIFIED OSTEOARTHRITIS, UNSPECIFIED SITE: ICD-10-CM

## 2025-04-18 DIAGNOSIS — R33.9 RETENTION OF URINE, UNSPECIFIED: ICD-10-CM
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
## 2025-04-18 DIAGNOSIS — Z87.891 PERSONAL HISTORY OF NICOTINE DEPENDENCE: ICD-10-CM

## 2025-04-18 DIAGNOSIS — I45.10 UNSPECIFIED RIGHT BUNDLE-BRANCH BLOCK: ICD-10-CM

## 2025-04-18 DIAGNOSIS — R53.1 WEAKNESS: ICD-10-CM

## 2025-04-18 DIAGNOSIS — R06.89 OTHER ABNORMALITIES OF BREATHING: ICD-10-CM

## 2025-04-18 DIAGNOSIS — I10 ESSENTIAL (PRIMARY) HYPERTENSION: ICD-10-CM

## 2025-04-18 LAB
ALBUMIN SERPL ELPH-MCNC: 3.8 G/DL — SIGNIFICANT CHANGE UP (ref 3.5–5.2)
ALBUMIN SERPL ELPH-MCNC: 3.9 G/DL — SIGNIFICANT CHANGE UP (ref 3.5–5.2)
ALP SERPL-CCNC: 93 U/L — SIGNIFICANT CHANGE UP (ref 30–115)
ALP SERPL-CCNC: 94 U/L — SIGNIFICANT CHANGE UP (ref 30–115)
ALT FLD-CCNC: 10 U/L — SIGNIFICANT CHANGE UP (ref 0–41)
ALT FLD-CCNC: 10 U/L — SIGNIFICANT CHANGE UP (ref 0–41)
ANION GAP SERPL CALC-SCNC: 13 MMOL/L — SIGNIFICANT CHANGE UP (ref 7–14)
ANION GAP SERPL CALC-SCNC: 15 MMOL/L — HIGH (ref 7–14)
APPEARANCE UR: CLEAR — SIGNIFICANT CHANGE UP
AST SERPL-CCNC: 18 U/L — SIGNIFICANT CHANGE UP (ref 0–41)
AST SERPL-CCNC: 18 U/L — SIGNIFICANT CHANGE UP (ref 0–41)
BASOPHILS # BLD AUTO: 0.05 K/UL — SIGNIFICANT CHANGE UP (ref 0–0.2)
BASOPHILS NFR BLD AUTO: 0.4 % — SIGNIFICANT CHANGE UP (ref 0–1)
BILIRUB SERPL-MCNC: 1.1 MG/DL — SIGNIFICANT CHANGE UP (ref 0.2–1.2)
BILIRUB SERPL-MCNC: 1.3 MG/DL — HIGH (ref 0.2–1.2)
BILIRUB UR-MCNC: NEGATIVE — SIGNIFICANT CHANGE UP
BUN SERPL-MCNC: 14 MG/DL — SIGNIFICANT CHANGE UP (ref 10–20)
BUN SERPL-MCNC: 15 MG/DL — SIGNIFICANT CHANGE UP (ref 10–20)
CALCIUM SERPL-MCNC: 9 MG/DL — SIGNIFICANT CHANGE UP (ref 8.4–10.5)
CALCIUM SERPL-MCNC: 9.4 MG/DL — SIGNIFICANT CHANGE UP (ref 8.4–10.5)
CHLORIDE SERPL-SCNC: 108 MMOL/L — SIGNIFICANT CHANGE UP (ref 98–110)
CHLORIDE SERPL-SCNC: 108 MMOL/L — SIGNIFICANT CHANGE UP (ref 98–110)
CO2 SERPL-SCNC: 19 MMOL/L — SIGNIFICANT CHANGE UP (ref 17–32)
CO2 SERPL-SCNC: 21 MMOL/L — SIGNIFICANT CHANGE UP (ref 17–32)
COLOR SPEC: SIGNIFICANT CHANGE UP
CREAT SERPL-MCNC: 1.1 MG/DL — SIGNIFICANT CHANGE UP (ref 0.7–1.5)
CREAT SERPL-MCNC: 1.2 MG/DL — SIGNIFICANT CHANGE UP (ref 0.7–1.5)
DIFF PNL FLD: NEGATIVE — SIGNIFICANT CHANGE UP
EGFR: 57 ML/MIN/1.73M2 — LOW
EGFR: 57 ML/MIN/1.73M2 — LOW
EGFR: 63 ML/MIN/1.73M2 — SIGNIFICANT CHANGE UP
EGFR: 63 ML/MIN/1.73M2 — SIGNIFICANT CHANGE UP
EOSINOPHIL # BLD AUTO: 0.14 K/UL — SIGNIFICANT CHANGE UP (ref 0–0.7)
EOSINOPHIL NFR BLD AUTO: 1.2 % — SIGNIFICANT CHANGE UP (ref 0–8)
FLUAV AG NPH QL: SIGNIFICANT CHANGE UP
FLUBV AG NPH QL: SIGNIFICANT CHANGE UP
GAS PNL BLDV: SIGNIFICANT CHANGE UP
GLUCOSE SERPL-MCNC: 100 MG/DL — HIGH (ref 70–99)
GLUCOSE SERPL-MCNC: 146 MG/DL — HIGH (ref 70–99)
GLUCOSE UR QL: NEGATIVE MG/DL — SIGNIFICANT CHANGE UP
HCT VFR BLD CALC: 44.7 % — SIGNIFICANT CHANGE UP (ref 42–52)
HGB BLD-MCNC: 15.2 G/DL — SIGNIFICANT CHANGE UP (ref 14–18)
IMM GRANULOCYTES NFR BLD AUTO: 0.6 % — HIGH (ref 0.1–0.3)
KETONES UR-MCNC: ABNORMAL MG/DL
LEUKOCYTE ESTERASE UR-ACNC: ABNORMAL
LYMPHOCYTES # BLD AUTO: 1.97 K/UL — SIGNIFICANT CHANGE UP (ref 1.2–3.4)
LYMPHOCYTES # BLD AUTO: 16.2 % — LOW (ref 20.5–51.1)
MAGNESIUM SERPL-MCNC: 2.5 MG/DL — HIGH (ref 1.8–2.4)
MAGNESIUM SERPL-MCNC: 3.1 MG/DL — CRITICAL HIGH (ref 1.8–2.4)
MCHC RBC-ENTMCNC: 31.1 PG — HIGH (ref 27–31)
MCHC RBC-ENTMCNC: 34 G/DL — SIGNIFICANT CHANGE UP (ref 32–37)
MCV RBC AUTO: 91.6 FL — SIGNIFICANT CHANGE UP (ref 80–94)
MONOCYTES # BLD AUTO: 1.06 K/UL — HIGH (ref 0.1–0.6)
MONOCYTES NFR BLD AUTO: 8.7 % — SIGNIFICANT CHANGE UP (ref 1.7–9.3)
NEUTROPHILS # BLD AUTO: 8.86 K/UL — HIGH (ref 1.4–6.5)
NEUTROPHILS NFR BLD AUTO: 72.9 % — SIGNIFICANT CHANGE UP (ref 42.2–75.2)
NITRITE UR-MCNC: NEGATIVE — SIGNIFICANT CHANGE UP
NRBC BLD AUTO-RTO: 0 /100 WBCS — SIGNIFICANT CHANGE UP (ref 0–0)
NT-PROBNP SERPL-SCNC: 708 PG/ML — HIGH (ref 0–300)
PH UR: 6 — SIGNIFICANT CHANGE UP (ref 5–8)
PLATELET # BLD AUTO: 244 K/UL — SIGNIFICANT CHANGE UP (ref 130–400)
PMV BLD: 10 FL — SIGNIFICANT CHANGE UP (ref 7.4–10.4)
POTASSIUM SERPL-MCNC: 3.5 MMOL/L — SIGNIFICANT CHANGE UP (ref 3.5–5)
POTASSIUM SERPL-MCNC: 3.7 MMOL/L — SIGNIFICANT CHANGE UP (ref 3.5–5)
POTASSIUM SERPL-SCNC: 3.5 MMOL/L — SIGNIFICANT CHANGE UP (ref 3.5–5)
POTASSIUM SERPL-SCNC: 3.7 MMOL/L — SIGNIFICANT CHANGE UP (ref 3.5–5)
PROT SERPL-MCNC: 7.4 G/DL — SIGNIFICANT CHANGE UP (ref 6–8)
PROT SERPL-MCNC: 7.7 G/DL — SIGNIFICANT CHANGE UP (ref 6–8)
PROT UR-MCNC: SIGNIFICANT CHANGE UP MG/DL
RBC # BLD: 4.88 M/UL — SIGNIFICANT CHANGE UP (ref 4.7–6.1)
RBC # FLD: 13.7 % — SIGNIFICANT CHANGE UP (ref 11.5–14.5)
RSV RNA NPH QL NAA+NON-PROBE: SIGNIFICANT CHANGE UP
SARS-COV-2 RNA SPEC QL NAA+PROBE: SIGNIFICANT CHANGE UP
SODIUM SERPL-SCNC: 142 MMOL/L — SIGNIFICANT CHANGE UP (ref 135–146)
SODIUM SERPL-SCNC: 142 MMOL/L — SIGNIFICANT CHANGE UP (ref 135–146)
SOURCE RESPIRATORY: SIGNIFICANT CHANGE UP
SP GR SPEC: 1.02 — SIGNIFICANT CHANGE UP (ref 1–1.03)
TROPONIN T, HIGH SENSITIVITY RESULT: 40 NG/L — HIGH (ref 6–21)
TROPONIN T, HIGH SENSITIVITY RESULT: 48 NG/L — HIGH (ref 6–21)
UROBILINOGEN FLD QL: 1 MG/DL — SIGNIFICANT CHANGE UP (ref 0.2–1)
WBC # BLD: 12.15 K/UL — HIGH (ref 4.8–10.8)
WBC # FLD AUTO: 12.15 K/UL — HIGH (ref 4.8–10.8)

## 2025-04-18 PROCEDURE — 81001 URINALYSIS AUTO W/SCOPE: CPT

## 2025-04-18 PROCEDURE — 0241U: CPT

## 2025-04-18 PROCEDURE — 84295 ASSAY OF SERUM SODIUM: CPT

## 2025-04-18 PROCEDURE — 36000 PLACE NEEDLE IN VEIN: CPT

## 2025-04-18 PROCEDURE — 71045 X-RAY EXAM CHEST 1 VIEW: CPT

## 2025-04-18 PROCEDURE — 51702 INSERT TEMP BLADDER CATH: CPT

## 2025-04-18 PROCEDURE — 84132 ASSAY OF SERUM POTASSIUM: CPT

## 2025-04-18 PROCEDURE — 84484 ASSAY OF TROPONIN QUANT: CPT

## 2025-04-18 PROCEDURE — 85014 HEMATOCRIT: CPT

## 2025-04-18 PROCEDURE — 85025 COMPLETE CBC W/AUTO DIFF WBC: CPT

## 2025-04-18 PROCEDURE — 36415 COLL VENOUS BLD VENIPUNCTURE: CPT

## 2025-04-18 PROCEDURE — 93308 TTE F-UP OR LMTD: CPT | Mod: 26

## 2025-04-18 PROCEDURE — 93010 ELECTROCARDIOGRAM REPORT: CPT

## 2025-04-18 PROCEDURE — 71045 X-RAY EXAM CHEST 1 VIEW: CPT | Mod: 26

## 2025-04-18 PROCEDURE — 93308 TTE F-UP OR LMTD: CPT

## 2025-04-18 PROCEDURE — 82803 BLOOD GASES ANY COMBINATION: CPT

## 2025-04-18 PROCEDURE — 83605 ASSAY OF LACTIC ACID: CPT

## 2025-04-18 PROCEDURE — 85018 HEMOGLOBIN: CPT

## 2025-04-18 PROCEDURE — 82330 ASSAY OF CALCIUM: CPT

## 2025-04-18 PROCEDURE — 83735 ASSAY OF MAGNESIUM: CPT

## 2025-04-18 PROCEDURE — 76705 ECHO EXAM OF ABDOMEN: CPT | Mod: 26

## 2025-04-18 PROCEDURE — 76705 ECHO EXAM OF ABDOMEN: CPT

## 2025-04-18 PROCEDURE — 93005 ELECTROCARDIOGRAM TRACING: CPT

## 2025-04-18 PROCEDURE — 80053 COMPREHEN METABOLIC PANEL: CPT

## 2025-04-18 PROCEDURE — 83880 ASSAY OF NATRIURETIC PEPTIDE: CPT

## 2025-04-18 PROCEDURE — 99285 EMERGENCY DEPT VISIT HI MDM: CPT | Mod: 25

## 2025-04-18 PROCEDURE — 99291 CRITICAL CARE FIRST HOUR: CPT

## 2025-04-18 NOTE — ED ADULT TRIAGE NOTE - CCCP TRG CHIEF CMPLNT
Problem: Discharge Planning  Goal: Discharge to home or other facility with appropriate resources  Outcome: Progressing     Problem: Pain  Goal: Verbalizes/displays adequate comfort level or baseline comfort level  Outcome: Progressing urinary catheter complications

## 2025-04-18 NOTE — ED PROVIDER NOTE - OBJECTIVE STATEMENT
89M with PMH of HTN, OA, former smoker, denies other PMH, BIBEMS for difficulty breathing. Pt is [place]/poor historian. Per son at bedside pt was in his usual state of health last night but woke up this morning weak, c/o difficulty breathing and urinary retention. Pt currently has no complaints at this time, poor historian. Denies any complaints of SOB or pain. Was given IV magnesium and 4mg decadron PTA    Per son pt sustained fall 2 weeks ago where he tripped and hit his R sided chest wall on the couch. Was seen by PMD, [facility] radiology and noted to have 4 rib frx. Has been doing well until this morning. No fever, cough, CP, SOB, abdominal pain, n/v/d, extremity swelling/numbness/weakness/paresthesias. Son denies hx of COPD/chf or other cardiac issues other than htn. States pt is ambulatory on own and independent of most ADLs    PMD Saloni Rolon

## 2025-04-18 NOTE — ED PROVIDER NOTE - CLINICAL SUMMARY MEDICAL DECISION MAKING FREE TEXT BOX
89M with PMH of HTN, OA, former smoker, denies other PMH, BIBEMS for difficulty breathing. pt is poor historian and Additional history obtained from son at bedside as well as EMS.  Son states approximately 2 weeks ago had a mechanical fall was found to have nondisplaced fracture on the read.  Was discharged home.  Son endorses this morning patient has been having some difficulty breathing they called ambulance which gave IV magnesium as well as corticosteroid and brought him here son also stated for a while patient has difficulty urinating.  Patient states he is trying to urinate but hide unable to pass urine. Labs unremarkable however ultrasound showed patient is in urinary retention.  Flores catheter placed at discharge rapid urology follow-up.

## 2025-04-18 NOTE — ED ADULT NURSE NOTE - OBJECTIVE STATEMENT
BIBA from home c/o sob, placed on CPAP by EMS due to increase work of breathing. Received mag and dex en route. Right AC 18g noted upon arrival.

## 2025-04-18 NOTE — ED PROVIDER NOTE - NSFOLLOWUPCLINICS_GEN_ALL_ED_FT
[facility]  Urology  .  [place]   Phone: [phone]  Fax:   Follow Up Time: 1-3 Days

## 2025-04-18 NOTE — ED ADULT NURSE NOTE - NSFALLHARMRISKINTERV_ED_ALL_ED

## 2025-04-18 NOTE — ED PROVIDER NOTE - PATIENT PORTAL LINK FT
You can access the FollowMyHealth Patient Portal offered by [facility] by registering at the following website: [email or web address]. By joining [employer]’s FollowMyHealth portal, you will also be able to view your health information using other applications (apps) compatible with our system.

## 2025-04-18 NOTE — ED PROVIDER NOTE - PHYSICAL EXAMINATION
VITAL SIGNS: I have reviewed nursing notes and confirm.  CONSTITUTIONAL: Well-developed; well-nourished; in no acute distress.  SKIN: Skin exam is warm and dry, no acute rash.  EYES: PERRL,  conjunctiva and sclera clear.  ENT: mmm  CARD: S1, S2 normal; no murmurs, gallops, or rubs. Regular rate and rhythm.  RESP: tachypneic but without retractions or distress. Lungs CTAB  ABD: soft, NT/ND.  EXT: Normal ROM. No clubbing, cyanosis or edema  NEURO: Alert, oriented. Grossly unremarkable. No focal deficits.  PSYCH: Cooperative, appropriate. VITAL SIGNS: I have reviewed nursing notes and confirm.  CONSTITUTIONAL: Well-developed; well-nourished; in no acute distress.  SKIN: Skin exam is warm and dry, no acute rash.  EYES: PERRL,  conjunctiva and sclera clear.  ENT: mmm  CARD: S1, S2 normal; no murmurs, gallops, or rubs. Regular rate and rhythm.  RESP: slightly tachypneic but without retractions or distress. Lungs CTAB  ABD: soft, NT/ND.  EXT: Normal ROM. No clubbing, cyanosis or edema  NEURO: Alert, oriented. Grossly unremarkable. No focal deficits.  PSYCH: Cooperative, appropriate.

## 2025-04-18 NOTE — ED PROVIDER NOTE - ATTENDING CONTRIBUTION TO CARE
89M with PMH of HTN, OA, former smoker, denies other PMH, BIBEMS for difficulty breathing. pt is poor historian and Additional history obtained from son at bedside as well as EMS.  Son states approximately 2 weeks ago had a mechanical fall was found to have nondisplaced fracture on the read.  Was discharged home.  Son endorses this morning patient has been having some difficulty breathing they called ambulance which gave IV magnesium as well as corticosteroid and brought him here son also stated for a while patient has difficulty urinating.  Patient states he is trying to urinate but hide unable to pass urine.  CONSTITUTIONAL: Well developed; in no acute distress  HEAD: Normocephalic; atraumatic  EYES: No conjunctival injection, no scleral icterus  ENT:No nasal discharge; airway clear.  NECK: Supple; non tender.  CARD: Warm and well perfused, not tachycardic  RESP: Breathing comfortably on RA, speaking in full sentences w/o distress No wheezing bilateral equal sounds.  Abdomen: Soft, non-tender, non-distended   EXT: No gross deformities, normal ROM.  SKIN: Warm and dry  NEURO: Alert, oriented, motor and sensory grossly intact  PSYCH: Calm, cooperative

## 2025-04-18 NOTE — ED PROVIDER NOTE - PROGRESS NOTE DETAILS
LAITH: Patient taken off of CPAP immediately on arrival to ED his work of breathing was normal.  He remains with O2 sat above 97% and normal work of breathing throughout ED stay.  Has no complaints.  Bedside sono shows urinary retention, Flores placed.  Labs show Trope of 48 then 40 but no new ischemic changes on EKG and patient has no complaints of chest pain.  Son at bedside states he lives with patient and will care for him at home.  Copy of all results provided, will give rapid referral for urology and discharged home with leg bag.  Supportive care and strict return precautions advised.  Patient and son are comfortable with plan    Patient to be discharged from ED. Any available test results were discussed with patient and/or family. Verbal instructions given, including instructions to return to ED immediately for any new, worsening, or concerning symptoms. Patient endorsed understanding. Written discharge instructions additionally given, including follow-up plan.

## 2025-04-18 NOTE — ED PROVIDER NOTE - NSFOLLOWUPINSTRUCTIONS_ED_ALL_ED_FT
Our Emergency Department Referral Coordinators will be reaching out to you in the next 24-48 hours from 9:00am to 5:00pm with a follow up appointment. Please expect a phone call from the hospital in that time frame. If you do not receive a call or if you have any questions or concerns, you can reach them at   [phone] ( urology)        Indwelling Urinary Catheter Bag Care, Adult  Lower part of the body, showing a catheter going from the bladder to a drainage bag outside the body.  An indwelling urinary catheter is a soft tube that is placed into the bladder to help drain pee (urine) out of the body. The catheter is put in through the urethra and is held inside your bladder by a balloon filled with water. The urethra is the part of the body that drains pee from the bladder.    Pee drains from the catheter into a drainage bag outside of the body. Taking good care of your catheter, catheter tubing, and the drainage bag will keep your catheter working well. It will also help prevent problems like urinary tract infections (UTIs).    What are the risks?  Germs (bacteria) may get into your bladder and cause a UTI.  The flow of pee can become blocked. This can happen if:  The catheter is not connected to the drainage bag correctly.  You have sediment or a blood clot in your bladder or catheter.  How to wear the catheter and drainage bag  Supplies needed:    Adhesive tape or a leg strap.  If you use tape you will need:  Alcohol wipes or soap and water.  A clean towel.  Overnight drainage bag.  Smaller drainage bag (leg bag).  Wearing your catheter and bag    A drainage bag attached to a person's leg. A close-up shows the catheter going from the bladder to the leg bag.  Use tape or a leg strap to attach the catheter and tubing to your leg.  Make sure the catheter is not pulled tight.  If a leg strap gets wet, replace it with a dry one.  If you use tape:  Use an alcohol wipe or soap and water to wash off any stickiness on your skin where you had tape before.  Use a clean towel to pat-dry the area.  Apply the new tape.  You should have received a large overnight drainage bag and a smaller leg bag that fits under clothing.  You may wear the overnight bag at any time, but you should not wear the leg bag at night.  Make sure the overnight drainage bag is always lower than the level of your bladder. But do not let the bag touch the floor.  Before you go to sleep, hang the bag on the side of a wastebasket that is covered by a clean plastic bag.  Secure the leg bag according to the 's instructions. This may be above or below the knee, depending on the length of the tubing. Make sure that:  The leg bag is below your bladder.  The tubing does not have loops or too much tension.  How to empty the drainage bag  Supplies needed:    Rubbing alcohol or alcohol wipes.  Gauze pad or cotton ball.  Toilet or a clean container.  Emptying the bag    Empty your drainage bag when it is ?–½ full, or at least 2–3 times a day. Clean the bag according to the 's instructions or as told by your health care provider.  Wash your hands with soap and water for at least 20 seconds. If soap and water are not available, use hand .  Hold the drainage bag over the toilet or the clean container used with the drainage bag. Make sure the drainage bag is lower than your hips and bladder. This stops pee from going back into the tubing and into your bladder.  Open the pour spout at the bottom of the bag.  Empty the pee into the toilet or container. Do not let the pour spout touch any surface. This prevents germs from getting in the bag and causing infection.  Use the gauze pad or cotton ball soaked with rubbing alcohol or an alcohol wipe to clean the pour spout.  Close the pour spout.  Wash your hands again with soap and water for at least 20 seconds.  How to change the drainage bag  Supplies needed:    Alcohol wipes.  A new drainage bag.  Adhesive tape or a leg strap.  Changing the bag    Change your drainage bag twice a day. Also replace your drainage bag with a new bag if it leaks, starts to smell bad, or looks dirty. Be sure to empty your drainage bag before you change bags.  Wash your hands with soap and water for at least 20 seconds. If soap and water are not available, use hand .  Detach the tubing or drainage bag from your skin.  Pinch the catheter with your fingers so that pee does not spill out.  Disconnect the catheter from the drainage bag tubing at the connection valve. Do not let the end of the catheter touch any surface.  Use an alcohol wipe to clean the end of the catheter and the end of the drainage bag tubing being connected.  Connect the catheter to the drainage bag tubing.  Attach the bag and tubing to your leg with tape or a leg strap. Avoid attaching the bag too tightly.  Wash your hands again with soap and water for at least 20 seconds.  General instructions  A person washing hands with soap and water.  Always wash your hands for at least 20 seconds before and after you handle your catheter or drainage bag. Use a mild, fragrance-free soap.  Never pull on your catheter or try to remove it. Pulling can damage your internal tissues.  Always make sure there are no leaks around the catheter or from the drainage bag and tubing.  Always make sure there are no twists, bends, or kinks in the catheter or the tubing.  Drink enough fluid to keep your pee pale yellow.  Do not take baths, swim, or use a hot tub.  If you are female, wipe from front to back after having a bowel movement.  Contact a health care provider if:  Your catheter starts to leak.  Your bladder feels full.  You have tiredness (lethargy), excessive sleepiness, or confusion.  You have signs of a UTI, such as:  A fever or chills.  Pain in your abdomen, legs, lower back, or bladder.  Get help right away if:  Your pee is pink or red or you see blood in the catheter.  Your pee is not draining into the bag.  Your catheter gets pulled out.  This information is not intended to replace advice given to you by your health care provider. Make sure you discuss any questions you have with your health care provider.

## 2025-04-18 NOTE — ED ADULT TRIAGE NOTE - CHIEF COMPLAINT QUOTE
Pt milan, was discharged from hospital today with anaya catheter. Pt pulled out anaya at home. Pt baseline is A&Ox4, pt is confused now. As per granddaughter pt is speaking gibberish and very confused. Pt A&O x1.

## 2025-04-19 NOTE — H&P ADULT - HISTORY OF PRESENT ILLNESS
89 yo M pmhx htn presenting to the ED for evaluation. pt son at bedside endorsing he has been more confused and speaking "gibberish". pt fell 2 weeks ago, diagnosed with 4 R sided rib fractures outpatient. pt also with urinary retention, seen in the ED earlier today and had anaya placed. Denies fever, chills, abd pain, chest pain, calf pain, nausea, vomiting, headache.      · BP Systolic	125 mm Hg  · BP Diastolic	73 mm Hg  · Heart Rate	 101 /min  · Respiration Rate (breaths/min)	18 /min  · Temp (F)	97.7 Degrees F  · Temp (C)	36.5 Degrees C  · Temp site	oral  · SpO2 (%)	96 %  · O2 Delivery/Oxygen Delivery Method	room air  · Temp at ED Arrival (C)	36.5 Degrees C    Labs: Hb 15.2> 13.2,  Troponin 48>40, UA : bloody (traumatic )     CT head:    Bones are diffusely osteopenic.    No evidence of acute compression deformity or facet subluxation.    Dens is intact. No thickening of the prevertebral soft tissues.    Multilevel degenerative changes.    Small scattered slightly ill-defined lucencies throughout the vertebral   column. For example posterior aspect of the dens left aspect of C2.    Heterogeneous left thyroid lobe goiter, partially imaged

## 2025-04-19 NOTE — ED PROVIDER NOTE - OBJECTIVE STATEMENT
89 yo M pmhx htn presenting to the ED for evaluation of worsening confusion x few weeks. pt son at bedside endorsing he has been more confused and speaking "gibberish". pt fell 2 weeks ago, diagnosed with 4 R sided rib fractures outpatient. pt also with urinary retention, seen in the ED earlier today and had anaya placed. Denies fever, chills, abd pain, chest pain, calf pain, nausea, vomiting, headache.

## 2025-04-19 NOTE — ED PROVIDER NOTE - PHYSICAL EXAMINATION
GENERAL: Well-nourished, Well-developed. NAD.  HEAD: No visible or palpable bumps or hematomas. No ecchymosis behind ears B/L.  ENMT: MMM.  Neck: Supple. FROM  CVS: Normal S1,S2. No murmurs appreciated on auscultation   RESP: No use of accessory muscles. Chest rise symmetrical with good expansion. Lungs clear to auscultation B/L. No wheezing, rales, or rhonchi auscultated.  GI: Normal auscultation of bowel sounds in all 4 quadrants. Soft, Nontender, Nondistended. No guarding or rebound tenderness. No CVAT B/L.  MSK: Extremities w/o deformity or ttp. No visible signs of trauma such as ecchymosis, erythema, or swelling. FROM of upper and lower extremities B/L. No midline spinal TTP. FROM of back with flexion and extension.  Skin: Warm, Dry. No rashes or lesions. Good cap refill < 2 sec B/L.  EXT: Radial and pedal pulses present B/L. No calf tenderness or swelling B/L. No palpable cords. No pedal edema B/L.  Neuro: AA&O x 3. Sensation grossly intact. Strength 5/5 B/L. Gait within normal limits.   : anaya in place, hematuria in anaya bag

## 2025-04-19 NOTE — ED ADULT NURSE NOTE - OBJECTIVE STATEMENT
pt BIBA c/o CESAR, was discharged from hospital today with Flores catheter, as per son pt pulling at Flores at home. pt BIBA c/o hematuria, was discharged from hospital today with Flores catheter, as per son pt pulling at Flores at home.

## 2025-04-19 NOTE — ED PROVIDER NOTE - CARE PLAN
Principal Discharge DX:	Confusion  Secondary Diagnosis:	Hematuria  Secondary Diagnosis:	Rib fracture   1

## 2025-04-19 NOTE — PHYSICAL THERAPY INITIAL EVALUATION ADULT - ADDITIONAL COMMENTS
Pt is poor historian due to cognitive status. Per medical records--pt  is AO*1 at home, lives on top floor of house,  confused most of time, identified family member.

## 2025-04-19 NOTE — ED PROVIDER NOTE - CLINICAL SUMMARY MEDICAL DECISION MAKING FREE TEXT BOX
Patient evaluated for confusion for several days.  Recently had Flores catheter placed, started on antibiotics for UTI.  CT imaging without acute findings.  Labs and imaging reviewed.  Son states he is having difficulty taking care of him at home due to forgetfulness.  Patient admitted for further evaluation and management.

## 2025-04-19 NOTE — PHYSICAL THERAPY INITIAL EVALUATION ADULT - GENERAL OBSERVATIONS, REHAB EVAL
4:50-5:14pm Pt encountered supine in bed, A & O x 1, confused but in NAD and follows simple command, +lap belt. Pt is [place](+head amplifier). Pt requires Min A in bed mobility and sit/stand transfer. Pt ambulated 25 ft Mod A using RW with decreased hans and unsteady balance. Pt tolerated PT session and left in bed as found for safety concern, RN aware. Pt will benefit from skilled PT 3-5x/wk for thera ex, functional mobility training, balance and gait training to improve mobility status.

## 2025-04-19 NOTE — H&P ADULT - ASSESSMENT
89yr male with pmhx of htn, hearing loss  came with his son for evaluation. pt was here during day, was retaining urine, was placed anaya and discharged home. At home pt started being agitated and tried to pulled out the anaya, was bleeding in anaya. Family are also having difficulty taking care of him at home and would want to place him in [facility] for some time.     pt  is AO*1 at home, lives on top floor of house,  confused most of time, identified family member, otherwise fairly dependent on son of age 68 for most of the chores.   Confusion is not NEW, SUNDOWING EVERY DAY AT HOME, SOMETIME WAKE UP AT NIGHT TO HAVE BREAKFAST THINKING ITS DAY TIME.       # Moderate to severe dementia   # urinary retention   #Traumatic hematuria   # H/o multiple falls   # HTN      - memory loss and confusion +  - Poor judgment and deepening confusion +  - Difficulty with daily tasks (getting dressed, eating, bathing) +  - Changes in personality and behavior +   - Inability to close friends +    - Trouble concentrating and performing complex tasks +   - ED vitals stable on admission.  - Labs: Hb 15.2> 13.2,  Troponin 48>40, UA : bloody (traumatic )   - CT HEAD:  1.  No evidence of acute traumatic intracranial pathology.  2.  Focal area of subcortical white matter hypoattenuation in the left   frontoparietal region with questionable associated cortical involvement.   Findings are nonspecific could represent chronic infarct, focal white   matter changes including chronic microvascular ischemic changes, focal   gliosis, focal demyelination amongst others. No mass effect to suggest   acute vasogenic edema. If available can consider correlation with prior   imaging. If symptoms persist nonemergent outpatient brain MRI can be   considered.  3.  Few ill-defined though nonaggressive appearing lucencies in the   calvarium largest in the left frontal region. Correlate with any relevant   history or prior imaging.    - CT cervical spine:   Few scattered lucencies throughout the vertebral column in conjunction with the above findings, nonspecific though can consider   multiple myeloma as a possible diagnosis.    - TOV in am   - Monitor H/H   - CM consult for placement   - c/w home nifedipine 30 ER OD   - more work for imaging finding would be futile given the degree of dementia and life expectancy of 2-4 years       DVT: heparin  GI: not indicated  diet: dash diet  activity as tolerated  fall precaution     code status discussed with son: would want DNR/DNI, however will come in morning to sign after discussing with his sister.      89yr male with pmhx of htn, hearing loss  came with his son for evaluation. pt was here during day, was retaining urine, was placed anaya and discharged home. At home pt started being agitated and tried to pulled out the anaya, was bleeding in anaya. Family are also having difficulty taking care of him at home and would want to place him in [facility] for some time.     pt  is AO*1 at home, lives on top floor of house,  confused most of time, identified family member, otherwise fairly dependent on son of age 68 for most of the chores.   Confusion is not NEW, SUNDOWING EVERY DAY AT HOME, SOMETIME WAKE UP AT NIGHT TO HAVE BREAKFAST THINKING ITS DAY TIME.       # Moderate to severe dementia   # urinary retention   #Traumatic hematuria   # H/o multiple falls   # HTN      - memory loss and confusion +  - Poor judgment and deepening confusion +  - Difficulty with daily tasks (getting dressed, eating, bathing) +  - Changes in personality and behavior +   - Inability to close friends +    - Trouble concentrating and performing complex tasks +   - ED vitals stable on admission.  - Labs: Hb 15.2> 13.2,  Troponin 48>40, UA : bloody (traumatic )   - CT HEAD:  1.  No evidence of acute traumatic intracranial pathology.  2.  Focal area of subcortical white matter hypoattenuation in the left   frontoparietal region with questionable associated cortical involvement.   Findings are nonspecific could represent chronic infarct, focal white   matter changes including chronic microvascular ischemic changes, focal   gliosis, focal demyelination amongst others. No mass effect to suggest   acute vasogenic edema. If available can consider correlation with prior   imaging. If symptoms persist nonemergent outpatient brain MRI can be   considered.  3.  Few ill-defined though nonaggressive appearing lucencies in the   calvarium largest in the left frontal region. Correlate with any relevant   history or prior imaging.    - CT cervical spine:   Few scattered lucencies throughout the vertebral column in conjunction with the above findings, nonspecific though can consider   multiple myeloma as a possible diagnosis.    - TOV in am   - Monitor H/H   - documented Temp 100.4 in ED, monitor off abx (no source), monitor vitals   - CM consult for placement   - c/w home nifedipine 30 ER OD   - more work for imaging finding would be futile given the degree of dementia and life expectancy of 2-4 years       DVT: heparin  GI: not indicated  diet: dash diet  activity as tolerated  fall precaution     code status discussed with son: would want DNR/DNI, however will come in morning to sign after discussing with his sister.

## 2025-04-19 NOTE — CHART NOTE - NSCHARTNOTEFT_GEN_A_CORE
patient is 89 y/o brought in by family because of altered MS, difficulty managing at home.  patient seen early today.  no c/o.  disoriented except for name.  afebrile.  not eating much.  had anaya placed because of urinary retention but patient pulled out anaya,  will monitor with frequent bladder scans.  major issue most likely discussion with family re placement.

## 2025-04-19 NOTE — PATIENT PROFILE ADULT - FALL HARM RISK - HARM RISK INTERVENTIONS

## 2025-04-19 NOTE — H&P ADULT - NSHPPHYSICALEXAM_GEN_ALL_CORE
GENERAL: NAD, lying in bed comfortably  HEAD:  Atraumatic, normocephalic  EYES: EOMI, PERRL  NECK: Supple, trachea midline, no JVD  HEART: Regular rate and rhythm  LUNGS: Unlabored respirations.  Clear to auscultation bilaterally, no crackles, wheezing, or rhonchi  ABDOMEN: Soft, nontender, nondistended, +BS, anaya+  EXTREMITIES: 2+ peripheral pulses bilaterally. No clubbing, cyanosis, or edema  NERVOUS SYSTEM:  A&Ox1, moving all extremities, no focal deficits

## 2025-04-19 NOTE — ED PROVIDER NOTE - ATTENDING APP SHARED VISIT CONTRIBUTION OF CARE
89-year-old male with PMH HTN presents for evaluation of increased confusion over the past 4 days.  Patient seen earlier in ED for urinary retention, Anaya placed but was pulling at it and now has some blood in the Anaya bag.  Patient also has had intermittent difficulty breathing.  Per history fell at home 2 weeks ago and had outpatient x-ray which showed 3-4 rib fractures.  Patient denies any fever but has some cough.  No abdominal pain, vomiting, diarrhea, headache, dizziness.  Son concerned he has not been able to take care of him and wants him to be admitted. Pt not in any anticoagulation.     VITAL SIGNS: noted  CONSTITUTIONAL: Well-developed; well-nourished; in no acute distress  HEAD: Normocephalic; atraumatic  EYES: PERRL, EOM intact; conjunctiva and sclera clear  ENT: No nasal discharge; airway clear. MMM  NECK: Supple; non tender. No anterior cervical lymphadenopathy noted  CARD: S1, S2 normal; no murmurs, gallops, or rubs. Regular rate and rhythm  RESP: CTAB/L, no wheezes, rales or rhonchi  ABD: Normal bowel sounds; soft; non-distended; non-tender; no CVA tenderness; anaya in place with hematuria noted in bag  EXT: Normal ROM. No calf tenderness or edema. Distal pulses intact  NEURO: Alert, oriented x 2. Grossly unremarkable. No focal deficits  SKIN: Skin exam is warm and dry, no acute rash  MS: No midline spinal tenderness

## 2025-04-19 NOTE — PHYSICAL THERAPY INITIAL EVALUATION ADULT - ASSISTIVE DEVICE FOR TRANSFER: SIT/STAND, REHAB EVAL
Progress Note - Text


Progress Note Date: 21





Postop day 1 from  under spinal anesthesia with intrathecal morphine 

given for postop pain management.  


Patient is doing well.


Pain is well controlled. On visual analog scale 3-4/10


Mild itching present


No nausea or vomiting reported.


No Headache or weakness and numbness in the legs.


No complications from spinal anesthesia.
rolling walker

## 2025-04-19 NOTE — PHYSICAL THERAPY INITIAL EVALUATION ADULT - PERTINENT HX OF CURRENT PROBLEM, REHAB EVAL
89yr male with pmhx of htn, hearing loss  came with his son for evaluation. pt was here during day, was retaining urine, was placed anaya and discharged home. At home pt started being agitated and tried to pulled out the anaya, was bleeding in anaya. Family are also having difficulty taking care of him at home and would want to place him in [facility] for some time.

## 2025-04-19 NOTE — H&P ADULT - NSHPLABSRESULTS_GEN_ALL_CORE
LABS:                          13.2   9.08  )-----------( 213      ( 19 Apr 2025 00:00 )             38.3     04-19    144  |  112[H]  |  16  ----------------------------<  108[H]  4.1   |  20  |  1.1    Ca    8.5      19 Apr 2025 00:00  Mg     2.5     04-18    TPro  6.5  /  Alb  3.7  /  TBili  0.8  /  DBili  x   /  AST  26  /  ALT  10  /  AlkPhos  77  04-19    LIVER FUNCTIONS - ( 19 Apr 2025 00:00 )  Alb: 3.7 g/dL / Pro: 6.5 g/dL / ALK PHOS: 77 U/L / ALT: 10 U/L / AST: 26 U/L / GGT: x           PT/INR - ( 19 Apr 2025 00:00 )   PT: 13.40 sec;   INR: 1.13 ratio         PTT - ( 19 Apr 2025 00:00 )  PTT:26.7 sec  Urinalysis Basic - ( 19 Apr 2025 01:31 )    Color: Red / Appearance: Cloudy / SG: >1.030 / pH: x  Gluc: x / Ketone: Negative mg/dL  / Bili: Negative / Urobili: 1.0 mg/dL   Blood: x / Protein: 100 mg/dL / Nitrite: Negative   Leuk Esterase: Small / RBC: >1900 /HPF / WBC 5 /HPF   Sq Epi: x / Non Sq Epi: 0 /HPF / Bacteria: Negative /HPF

## 2025-04-19 NOTE — H&P ADULT - ATTENDING COMMENTS
HPI:   89 yo M pmhx htn presenting to the ED for evaluation. pt son at bedside endorsing he has been more confused and speaking "gibberish". pt fell 2 weeks ago, diagnosed with 4 R sided rib fractures outpatient. pt also with urinary retention, seen in the ED earlier today and had anaya placed. Denies fever, chills, abd pain, chest pain, calf pain, nausea, vomiting, headache.      · BP Systolic	125 mm Hg  · BP Diastolic	73 mm Hg  · Heart Rate	 101 /min  · Respiration Rate (breaths/min)	18 /min  · Temp (F)	97.7 Degrees F  · Temp (C)	36.5 Degrees C  · Temp site	oral  · SpO2 (%)	96 %  · O2 Delivery/Oxygen Delivery Method	room air  · Temp at ED Arrival (C)	36.5 Degrees C    Labs: Hb 15.2> 13.2,  Troponin 48>40, UA : bloody (traumatic )     CT head:    Bones are diffusely osteopenic.    No evidence of acute compression deformity or facet subluxation.    Dens is intact. No thickening of the prevertebral soft tissues.    Multilevel degenerative changes.    Small scattered slightly ill-defined lucencies throughout the vertebral   column. For example posterior aspect of the dens left aspect of C2.    Heterogeneous left thyroid lobe goiter, partially imaged     (19 Apr 2025 03:48)  REVIEW OF SYSTEMS: see cc/HPI   CONSTITUTIONAL: No weakness, fevers or chills  EYES/ENT: No visual changes;  No vertigo or throat pain   NECK: No pain or stiffness  RESPIRATORY: No cough, wheezing, hemoptysis; No shortness of breath  CARDIOVASCULAR: No chest pain or palpitations  GASTROINTESTINAL: No abdominal or epigastric pain. No nausea, vomiting, or hematemesis; No diarrhea or constipation. No melena or hematochezia.  GENITOURINARY: No dysuria, frequency or hematuria  NEUROLOGICAL: No numbness or weakness  SKIN: No itching, rashes  ENDO: No hyperglycemia, No thyroid disorder, No dyslipidemia   HEM: No bleeding, No easy bruising, No anemia   PSYCHE: No psychosis, No mood disorder No hallucinations No delusion   MSK: No deformity, No fracture, No Joint swelling    Physical Exam:  General: WN/WD NAD  Neurology: A&Ox3, nonfocal, follows commands  Eyes: PERRLA/ EOMI  ENT/Neck: Neck supple, trachea midline, No JVD  Respiratory: CTA B/L, No wheezing, rales, rhonchi  CV: Normal rate regular rhythm, S1S2, no murmurs, rubs or gallops  Abdominal: Soft, NT, ND +BS,   Extremities: No edema, + peripheral pulses  Skin: No Rashes, Hematoma, Ecchymosis    A/p HPI:   89 yo M pmhx htn presenting to the ED for evaluation. pt son at bedside endorsing he has been more confused and speaking "gibberish". pt fell 2 weeks ago, diagnosed with 4 R sided rib fractures outpatient. pt also with urinary retention, seen in the ED earlier today and had anaya placed. Denies fever, chills, abd pain, chest pain, calf pain, nausea, vomiting, headache.      · BP Systolic	125 mm Hg  · BP Diastolic	73 mm Hg  · Heart Rate	 101 /min  · Respiration Rate (breaths/min)	18 /min  · Temp (F)	97.7 Degrees F  · Temp (C)	36.5 Degrees C  · Temp site	oral  · SpO2 (%)	96 %  · O2 Delivery/Oxygen Delivery Method	room air  · Temp at ED Arrival (C)	36.5 Degrees C    Labs: Hb 15.2> 13.2,  Troponin 48>40, UA : bloody (traumatic )     CT head:    Bones are diffusely osteopenic.    No evidence of acute compression deformity or facet subluxation.    Dens is intact. No thickening of the prevertebral soft tissues.    Multilevel degenerative changes.    Small scattered slightly ill-defined lucencies throughout the vertebral   column. For example posterior aspect of the dens left aspect of C2.    Heterogeneous left thyroid lobe goiter, partially imaged     (19 Apr 2025 03:48)  REVIEW OF SYSTEMS: see cc/HPI   CONSTITUTIONAL: No weakness, fevers or chills  EYES/ENT: No visual changes;  No vertigo or throat pain   NECK: No pain or stiffness  RESPIRATORY: No cough, wheezing, hemoptysis; No shortness of breath  CARDIOVASCULAR: No chest pain or palpitations  GASTROINTESTINAL: No abdominal or epigastric pain. No nausea, vomiting, or hematemesis; No diarrhea or constipation. No melena or hematochezia.  GENITOURINARY: No dysuria, frequency or hematuria, (+) retention and Anaya place ?? output volume  NEUROLOGICAL: No numbness or weakness  SKIN: No itching, rashes  ENDO: No hyperglycemia, No thyroid disorder, No dyslipidemia   HEM: No bleeding, No easy bruising, No anemia   PSYCHE: No psychosis, No mood disorder No hallucinations No delusion   MSK: No deformity, No fracture, No Joint swelling    Physical Exam:  General: WN/WD NAD  Neurology: A&Ox1,  nonfocal, follows commands  Eyes: PERRLA/ EOMI  ENT/Neck: Neck supple, trachea midline, No JVD  Respiratory: CTA B/L, No wheezing, rales, rhonchi  CV: Normal rate regular rhythm, S1S2, no murmurs, rubs or gallops  Abdominal: Soft, NT, ND +BS,   Extremities: No edema, + peripheral pulses  Skin: No Rashes, Hematoma, Ecchymosis  Tube- Anaya placed in the ED / clear urine     A/p  suspected worsening dementia - unspecified type w/ behavioral disturbance   Agitation   H/o Multiple fall - muscle deconditioning   -Admit to floor   -Fall precautions   -PT/ Rehab eval   -1:1 PRN for behavioral   - / case management - d/w family acute rehab vs. home w/ services ( son expressed wish for placement)    Urinary retention   Hematuria ( traumatic - patient pulled anaya)  -start Flomax   -D/c Anaya and TOV / Bladder scan for PVR     Small scattered lucencies throughout the vertebral column - non specific  ( no criteria for MM)   -c/w son possible w/u MRI / biopsy when he's available ( unclear of benefit )    Isolated fever w/o infectious source   -Pan-culture / RVP   -No Abx for now    HTN - BP stable   -c/w Nifedipine     DVT prophylaxis     GOC to be clarified in AM w/ son after family discussion

## 2025-04-19 NOTE — ED ADULT NURSE NOTE - NSFALLHARMRISKINTERV_ED_ALL_ED
Assistance OOB with selected safe patient handling equipment if applicable/Assistance with ambulation/Communicate risk of Fall with Harm to all staff, patient, and family/Monitor gait and stability/Monitor for mental status changes and reorient to person, place, and time, as needed/Provide visual cue: red socks, yellow wristband, yellow gown, etc/Reinforce activity limits and safety measures with patient and family/Toileting schedule using arm’s reach rule for commode and bathroom/Use of alarms - bed, stretcher, chair and/or video monitoring/Bed in lowest position, wheels locked, appropriate side rails in place/Call bell, personal items and telephone in reach/Instruct patient to call for assistance before getting out of bed/chair/stretcher/Non-slip footwear applied when patient is off stretcher/[place] to call system/Physically safe environment - no spills, clutter or unnecessary equipment/Purposeful Proactive Rounding/Room/bathroom lighting operational, light cord in reach

## 2025-04-20 NOTE — PROGRESS NOTE ADULT - SUBJECTIVE AND OBJECTIVE BOX
SUBJECTIVE:    Patient is a 89y old Male who presents with a chief complaint of hematuria (19 Apr 2025 03:48)    Currently admitted to medicine with the primary diagnosis of Confusion       Today is hospital day 1d.     PAST MEDICAL & SURGICAL HISTORY  Hypertension      ALLERGIES:  No Known Allergies    MEDICATIONS:  STANDING MEDICATIONS  brimonidine 0.2% Ophthalmic Solution 1 Drop(s) Both EYES two times a day  chlorhexidine 2% Cloths 1 Application(s) Topical daily  latanoprost 0.005% Ophthalmic Solution 1 Drop(s) Both EYES at bedtime  NIFEdipine XL 30 milliGRAM(s) Oral daily  potassium chloride   Powder 40 milliEquivalent(s) Oral once  timolol 0.5% Solution 1 Drop(s) Both EYES two times a day    PRN MEDICATIONS  acetaminophen     Tablet .. 650 milliGRAM(s) Oral every 6 hours PRN  aluminum hydroxide/magnesium hydroxide/simethicone Suspension 30 milliLiter(s) Oral every 4 hours PRN  haloperidol     Tablet 0.5 milliGRAM(s) Oral at bedtime PRN  ondansetron Injectable 4 milliGRAM(s) IV Push every 8 hours PRN    VITALS:   T(F): 97.9  HR: 94  BP: 132/84  RR: 18  SpO2: 93%    LABS:                        15.6   13.86 )-----------( 247      ( 20 Apr 2025 05:53 )             44.8     04-20    143  |  106  |  11  ----------------------------<  103[H]  3.1[L]   |  20  |  1.0    Ca    9.0      20 Apr 2025 05:53  Mg     2.0     04-20    TPro  7.3  /  Alb  3.8  /  TBili  1.5[H]  /  DBili  x   /  AST  50[H]  /  ALT  17  /  AlkPhos  92  04-20    PT/INR - ( 19 Apr 2025 00:00 )   PT: 13.40 sec;   INR: 1.13 ratio         PTT - ( 19 Apr 2025 00:00 )  PTT:26.7 sec  Urinalysis Basic - ( 20 Apr 2025 05:53 )    Color: x / Appearance: x / SG: x / pH: x  Gluc: 103 mg/dL / Ketone: x  / Bili: x / Urobili: x   Blood: x / Protein: x / Nitrite: x   Leuk Esterase: x / RBC: x / WBC x   Sq Epi: x / Non Sq Epi: x / Bacteria: x            Urinalysis with Rflx Culture (collected 18 Apr 2025 13:13)          RADIOLOGY:    PHYSICAL EXAM:  GEN: No acute distress  LUNGS: Clear to auscultation bilaterally   HEART: S1/S2 present. RRR.   ABD/ GI: Soft, non-tender, non-distended. Bowel sounds present  EXT: NC/NC/NE/2+PP/BOLES  NEURO: Awake and alert

## 2025-04-20 NOTE — PROGRESS NOTE ADULT - ASSESSMENT
89 yo M pmhx htn presenting to the ED for evaluation. pt son at bedside endorsing he has been more confused and speaking "gibberish". pt fell 2 weeks ago, diagnosed with 4 R sided rib fractures outpatient. pt also with urinary retention, seen in the ED earlier today and had anaya placed. Denies fever, chills, abd pain, chest pain, calf pain, nausea, vomiting, headache.      suspected worsening dementia - unspecified type w/ behavioral disturbance   haldol 0.5mg given IM  Agitation -- is stable currently -- will add po zyprexa today PRN.  H/o Multiple fall - muscle deconditioning   --Fall precautions   -PT/ Rehab eval   -1:1 PRN for behavioral   - / case management - d/w family acute rehab vs. home w/ services ( son expressed wish for placement)    Urinary retention   Hematuria ( traumatic - patient pulled anaya)  -start Flomax   -D/c Anaya and TOV / Bladder scan for PVR-- was done twice last night    < from: CT Abdomen and Pelvis w/ IV Cont (04.19.25 @ 00:26) >  Osteopenia. Degenerative changes. 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      HTN - BP stable   -c/w Nifedipine XL 30mg    DVT prophylaxis   seen by PT-- 25 ft-- SNF recommended.  still with restraints 89 yo M pmhx htn presenting to the ED for evaluation. pt son at bedside endorsing he has been more confused and speaking "gibberish". pt fell 2 weeks ago, diagnosed with 4 R sided rib fractures outpatient. pt also with urinary retention, seen in the ED earlier today and had anaya placed. Denies fever, chills, abd pain, chest pain, calf pain, nausea, vomiting, headache.      suspected worsening dementia - unspecified type w/ behavioral disturbance   haldol 0.5mg given IM  Agitation -- is stable currently -- will add po zyprexa today PRN.  H/o Multiple fall - muscle deconditioning   --Fall precautions   -PT/ Rehab eval   -1:1 PRN for behavioral   - / case management - d/w family acute rehab vs. home w/ services ( son expressed wish for placement)    Urinary retention started at home  Hematuria ( traumatic - patient pulled anaya)  -start Flomax   -D/c Anaya and TOV / Bladder scan for PVR-- was done twice last night  us kidneys   DC haldol more anticholinergic effects -- risk of more urinary retention    < from: CT Abdomen and Pelvis w/ IV Cont (04.19.25 @ 00:26) >  Osteopenia. Degenerative changes. 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      HTN - BP stable   -c/w Nifedipine XL 30mg    DVT prophylaxis   seen by PT-- 25 ft-- SNF recommended.  still with restraints-- spoke with son -- not too bad at home-- worse now-- wants SNF

## 2025-04-21 NOTE — PROGRESS NOTE ADULT - SUBJECTIVE AND OBJECTIVE BOX
Patient is a 89y old  Male who presents with a chief complaint of hematuria (21 Apr 2025 09:48)      OVERNIGHT EVENTS: no major events reported, remained stable    SUBJECTIVE / INTERVAL HPI: Patient seen and examined at bedside.     VITAL SIGNS:  Vital Signs Last 24 Hrs  T(C): 36.2 (21 Apr 2025 05:00), Max: 36.7 (20 Apr 2025 20:36)  T(F): 97.1 (21 Apr 2025 05:00), Max: 98 (20 Apr 2025 20:36)  HR: 99 (21 Apr 2025 05:00) (92 - 103)  BP: 124/76 (21 Apr 2025 05:00) (119/74 - 124/76)  BP(mean): --  RR: 18 (21 Apr 2025 05:00) (18 - 18)  SpO2: 96% (21 Apr 2025 05:00) (95% - 97%)    Parameters below as of 20 Apr 2025 20:36  Patient On (Oxygen Delivery Method): room air        PHYSICAL EXAM:    General: old male not in distress   HEENT: NC/AT; PERRL, clear conjunctiva  Neck: supple  Cardiovascular: +S1/S2; RRR  Respiratory: CTA b/l; no W/R/R  Gastrointestinal: soft, NT/ND; +BSx4  Extremities: WWP; 2+ peripheral pulses; no edema   Neurological: alert and awake, oriented to name only, moves all exts,  no focal deficits    MEDICATIONS:  MEDICATIONS  (STANDING):  brimonidine 0.2% Ophthalmic Solution 1 Drop(s) Both EYES two times a day  chlorhexidine 2% Cloths 1 Application(s) Topical daily  enoxaparin Injectable 40 milliGRAM(s) SubCutaneous every 24 hours  latanoprost 0.005% Ophthalmic Solution 1 Drop(s) Both EYES at bedtime  NIFEdipine XL 30 milliGRAM(s) Oral daily  tamsulosin 0.4 milliGRAM(s) Oral at bedtime  timolol 0.5% Solution 1 Drop(s) Both EYES two times a day    MEDICATIONS  (PRN):  acetaminophen     Tablet .. 650 milliGRAM(s) Oral every 6 hours PRN Temp greater or equal to 38C (100.4F), Mild Pain (1 - 3)  aluminum hydroxide/magnesium hydroxide/simethicone Suspension 30 milliLiter(s) Oral every 4 hours PRN Dyspepsia  ondansetron Injectable 4 milliGRAM(s) IV Push every 8 hours PRN Nausea and/or Vomiting      ALLERGIES:  Allergies    No Known Allergies    Intolerances        LABS:                        14.9   11.01 )-----------( 203      ( 21 Apr 2025 06:55 )             42.8     04-21    141  |  108  |  17  ----------------------------<  102[H]  3.6   |  22  |  1.0    Ca    8.7      21 Apr 2025 06:55  Mg     2.1     04-21    TPro  6.6  /  Alb  3.6  /  TBili  1.0  /  DBili  x   /  AST  37  /  ALT  17  /  AlkPhos  85  04-21      Urinalysis Basic - ( 21 Apr 2025 06:55 )    Color: x / Appearance: x / SG: x / pH: x  Gluc: 102 mg/dL / Ketone: x  / Bili: x / Urobili: x   Blood: x / Protein: x / Nitrite: x   Leuk Esterase: x / RBC: x / WBC x   Sq Epi: x / Non Sq Epi: x / Bacteria: x      CAPILLARY BLOOD GLUCOSE          RADIOLOGY & ADDITIONAL TESTS: Reviewed.    ASSESSMENT:    PLAN:

## 2025-04-21 NOTE — DISCHARGE NOTE PROVIDER - HOSPITAL COURSE
89 y/o M with pmhx htn, hearing loss dementia axox1 at b/l, p/w ams and agitation. Pt was trying to pull out anaya at home and it bled.    #Moderate to severe dementia w. behavioral disturbances   #H/o multiple falls   - CTH -ve   - consider gabpentin 300, benadryl, or ativa for agitation, pt has prolonged qtc     #Urinary retention   #Traumatic hematuria   - Anaya DC'ed TOV   - c/w tamsulosin     #HTN  - c/w nifedipine 30    DC to [facility]    89 y/o M with pmhx htn, hearing loss dementia axox1 at b/l, p/w ams and agitation. Pt was trying to pull out anaya at home and it bled.    #Moderate to severe dementia w. behavioral disturbances   #H/o multiple falls   - CTH -ve   - consider gabpentin 300, benadryl, or ativan for agitation, pt has prolonged qtc     #Urinary retention   #Traumatic hematuria   - Anaya DC'ed TOV   - c/w tamsulosin     #HTN  - c/w nifedipine 30    DC to [facility] likely 4/22

## 2025-04-21 NOTE — DISCHARGE NOTE PROVIDER - NSDCCPCAREPLAN_GEN_ALL_CORE_FT
PRINCIPAL DISCHARGE DIAGNOSIS  Diagnosis: Confusion  Assessment and Plan of Treatment: You came in for confusion. A ct scan of your head was negative, please your anaya was removed and you continued to make urine using a medication using tamsulosin. you will be discharged to a rehab facitily for ongoing care. Please follow up with your PCP in 1 week for your recent hopsitalization.      SECONDARY DISCHARGE DIAGNOSES  Diagnosis: Hematuria  Assessment and Plan of Treatment:     Diagnosis: Rib fracture  Assessment and Plan of Treatment:

## 2025-04-21 NOTE — PROGRESS NOTE ADULT - ASSESSMENT
89 y/o M with pmhx htn, hearing loss dementia axox1 at b/l, p/w ams and agitation. Pt was trying to pull out anaya at home and it bled.    #Moderate to severe dementia w. behavioral disturbances   #H/o multiple falls   - CTH -ve   - c/w zyprexa 2.5 prn     # urinary retention   #Traumatic hematuria   - Anaya DC'ed TOV   - c/w tamsulosin     #HTN  - c/w nifedipine 30    DVT: heparin  GI: not indicated  diet: dash diet  activity as tolerated  dispo: pending dc to [facility]   89 y/o M with pmhx htn, hearing loss dementia axox1 at b/l, p/w ams and agitation. Pt was trying to pull out anaya at home and it bled.    #Moderate to severe dementia w. behavioral disturbances   #H/o multiple falls   - CTH -ve   - consider gabpentin 300, benadryl, or ativan for agitation, pt has prolonged qtc     #Urinary retention   #Traumatic hematuria   - Anaya DC'ed TOV   - c/w tamsulosin     #HTN  - c/w nifedipine 30      #DVT PPx- LVNX  #GI PPx-  #Diet- DASH  #Dispo- DC to [facility] likely 4/22  #Code- FULL

## 2025-04-21 NOTE — DISCHARGE NOTE PROVIDER - CARE PROVIDER_API CALL
Nain Guallpa  [facility] Medicine  [address] [place] [id number]  Phone: [phone]  Fax: [phone]  Follow Up Time: 1 week

## 2025-04-21 NOTE — DISCHARGE NOTE PROVIDER - PROVIDER RX CONTACT NUMBER
27 y/o F with no PMHX presents to ED c/o right shoulder pain 27 y/o F with no PMHX presents to ED c/o right shoulder pain today pt stated that she woke up with it, painful, worse with movement no associated symptoms denies trauma, numbness, and other concerns. [phone]

## 2025-04-21 NOTE — DISCHARGE NOTE PROVIDER - NSDCFUSCHEDAPPT_GEN_ALL_CORE_FT
Terry Blackwell  [facility] Physician [facility]  CARDIOLOGY [address]  Scheduled Appointment: 06/04/2025

## 2025-04-21 NOTE — DISCHARGE NOTE PROVIDER - NSDCMRMEDTOKEN_GEN_ALL_CORE_FT
brimonidine 0.2% ophthalmic solution: 1 drop(s) to each affected eye 2 times a day  enoxaparin: 40 milligram(s) subcutaneous once a day  latanoprost 0.005% ophthalmic solution: 1 drop(s) to each affected eye once a day (at bedtime)  NIFEdipine 30 mg oral tablet, extended release: 1 tab(s) orally once a day  tamsulosin 0.4 mg oral capsule: 1 cap(s) orally once a day (at bedtime)  timolol maleate 0.5% ophthalmic solution: 1 drop(s) to each affected eye 2 times a day

## 2025-04-21 NOTE — PROGRESS NOTE ADULT - ASSESSMENT
89 yo M pmhx htn presenting to the ED for evaluation. pt son at bedside endorsing he has been more confused and speaking "gibberish". pt fell 2 weeks ago, diagnosed with 4 R sided rib fractures outpatient. pt also with urinary retention, seen in the ED earlier today and had anaya placed.       # worsening dementia   # reported Agitation   # H/o Multiple fall, deconditioning   -- trauma tabor neg   -- Fall precautions   - PT/ Rehab eval   - has been monitored in the hspital, no abnormal or aggressive behavioral observed   -  / case management for placement     # Urinary retention started at home  # Hematuria ( traumatic - patient pulled anaya)  - stated Flomax   - passed TOV /    # prolong QTc:  - repeat EKG  - avoid QTc prolong meds   - can do gabapentin or benadryl for agitation PRN     # incidental CT scan findings:    --  Focal area of subcortical white matter hypoattenuation in the left frontoparietal region with questionable associated cortical involvement.    Findings are nonspecific could represent chronic infarct, focal white matter changes including chronic microvascular ischemic changes, focal   gliosis, focal demyelination, If symptoms persist nonemergent outpatient brain MRI can be considered.  -- Few ill-defined though nonaggressive appearing lucencies in the   calvarium largest in the left frontal region.   -- Few scattered lucencies throughout the vertebral column in conjunction with the above findings, nonspecific though can consider   multiple myeloma if within goc      HTN   -c/w Nifedipine XL 30mg    DVT prophylaxis lovenox     pending DC process, placement

## 2025-04-21 NOTE — PROGRESS NOTE ADULT - SUBJECTIVE AND OBJECTIVE BOX
TOÑO HORNER 89y Male  MRN#: [id number]   Hospital Day: 2d    HPI:   89 yo M pmhx htn presenting to the ED for evaluation. pt son at bedside endorsing he has been more confused and speaking "gibberish". pt fell 2 weeks ago, diagnosed with 4 R sided rib fractures outpatient. pt also with urinary retention, seen in the ED earlier today and had anaya placed. Denies fever, chills, abd pain, chest pain, calf pain, nausea, vomiting, headache.      · BP Systolic	125 mm Hg  · BP Diastolic	73 mm Hg  · Heart Rate	 101 /min  · Respiration Rate (breaths/min)	18 /min  · Temp (F)	97.7 Degrees F  · Temp (C)	36.5 Degrees C  · Temp site	oral  · SpO2 (%)	96 %  · O2 Delivery/Oxygen Delivery Method	room air  · Temp at ED Arrival (C)	36.5 Degrees C    Labs: Hb 15.2> 13.2,  Troponin 48>40, UA : bloody (traumatic )     CT head:    Bones are diffusely osteopenic.    No evidence of acute compression deformity or facet subluxation.    Dens is intact. No thickening of the prevertebral soft tissues.    Multilevel degenerative changes.    Small scattered slightly ill-defined lucencies throughout the vertebral   column. For example posterior aspect of the dens left aspect of C2.    Heterogeneous left thyroid lobe goiter, partially imaged       (19 Apr 2025 03:48)      SUBJECTIVE      OBJECTIVE  PAST MEDICAL & SURGICAL HISTORY  Hypertension      ALLERGIES:  No Known Allergies    MEDICATIONS:  STANDING MEDICATIONS  brimonidine 0.2% Ophthalmic Solution 1 Drop(s) Both EYES two times a day  chlorhexidine 2% Cloths 1 Application(s) Topical daily  latanoprost 0.005% Ophthalmic Solution 1 Drop(s) Both EYES at bedtime  NIFEdipine XL 30 milliGRAM(s) Oral daily  tamsulosin 0.4 milliGRAM(s) Oral at bedtime  timolol 0.5% Solution 1 Drop(s) Both EYES two times a day    PRN MEDICATIONS  acetaminophen     Tablet .. 650 milliGRAM(s) Oral every 6 hours PRN  aluminum hydroxide/magnesium hydroxide/simethicone Suspension 30 milliLiter(s) Oral every 4 hours PRN  OLANZapine 2.5 milliGRAM(s) Oral two times a day PRN  ondansetron Injectable 4 milliGRAM(s) IV Push every 8 hours PRN      VITAL SIGNS: Last 24 Hours  T(C): 36.2 (21 Apr 2025 05:00), Max: 36.7 (20 Apr 2025 20:36)  T(F): 97.1 (21 Apr 2025 05:00), Max: 98 (20 Apr 2025 20:36)  HR: 99 (21 Apr 2025 05:00) (92 - 103)  BP: 124/76 (21 Apr 2025 05:00) (119/74 - 132/84)  BP(mean): --  RR: 18 (21 Apr 2025 05:00) (18 - 18)  SpO2: 96% (21 Apr 2025 05:00) (93% - 97%)    LABS:                        15.6   13.86 )-----------( 247      ( 20 Apr 2025 05:53 )             44.8     04-20    143  |  106  |  11  ----------------------------<  103[H]  3.1[L]   |  20  |  1.0    Ca    9.0      20 Apr 2025 05:53  Mg     2.0     04-20    TPro  7.3  /  Alb  3.8  /  TBili  1.5[H]  /  DBili  x   /  AST  50[H]  /  ALT  17  /  AlkPhos  92  04-20      Urinalysis Basic - ( 20 Apr 2025 05:53 )    Color: x / Appearance: x / SG: x / pH: x  Gluc: 103 mg/dL / Ketone: x  / Bili: x / Urobili: x   Blood: x / Protein: x / Nitrite: x   Leuk Esterase: x / RBC: x / WBC x   Sq Epi: x / Non Sq Epi: x / Bacteria: x            Urinalysis with Rflx Culture (collected 18 Apr 2025 13:13)              PHYSICAL EXAM:  GENERAL: NAD, well-developed.  HEAD:  Atraumatic, Normocephalic.  EYES: conjunctiva and sclera clear  CHEST/LUNG: GBAE. No wheezing or crackles   HEART: regular rate and rhythm; S1/S2.  ABDOMEN: Soft, Nontender, Nondistended  EXTREMITIES: No edema.   PSYCH: AAOx3.  NEUROLOGY: non-focal; moves all extremities     TOÑO HORNER 89y Male  MRN#: [id number]   Hospital Day: 2d    HPI:   89 yo M pmhx htn presenting to the ED for evaluation. pt son at bedside endorsing he has been more confused and speaking "gibberish". pt fell 2 weeks ago, diagnosed with 4 R sided rib fractures outpatient. pt also with urinary retention, seen in the ED earlier today and had anaya placed. Denies fever, chills, abd pain, chest pain, calf pain, nausea, vomiting, headache.      · BP Systolic	125 mm Hg  · BP Diastolic	73 mm Hg  · Heart Rate	 101 /min  · Respiration Rate (breaths/min)	18 /min  · Temp (F)	97.7 Degrees F  · Temp (C)	36.5 Degrees C  · Temp site	oral  · SpO2 (%)	96 %  · O2 Delivery/Oxygen Delivery Method	room air  · Temp at ED Arrival (C)	36.5 Degrees C    Labs: Hb 15.2> 13.2,  Troponin 48>40, UA : bloody (traumatic )     CT head:    Bones are diffusely osteopenic.    No evidence of acute compression deformity or facet subluxation.    Dens is intact. No thickening of the prevertebral soft tissues.    Multilevel degenerative changes.    Small scattered slightly ill-defined lucencies throughout the vertebral   column. For example posterior aspect of the dens left aspect of C2.    Heterogeneous left thyroid lobe goiter, partially imaged       (19 Apr 2025 03:48)      SUBJECTIVE      OBJECTIVE  PAST MEDICAL & SURGICAL HISTORY  Hypertension      ALLERGIES:  No Known Allergies    MEDICATIONS:  STANDING MEDICATIONS  brimonidine 0.2% Ophthalmic Solution 1 Drop(s) Both EYES two times a day  chlorhexidine 2% Cloths 1 Application(s) Topical daily  latanoprost 0.005% Ophthalmic Solution 1 Drop(s) Both EYES at bedtime  NIFEdipine XL 30 milliGRAM(s) Oral daily  tamsulosin 0.4 milliGRAM(s) Oral at bedtime  timolol 0.5% Solution 1 Drop(s) Both EYES two times a day    PRN MEDICATIONS  acetaminophen     Tablet .. 650 milliGRAM(s) Oral every 6 hours PRN  aluminum hydroxide/magnesium hydroxide/simethicone Suspension 30 milliLiter(s) Oral every 4 hours PRN  OLANZapine 2.5 milliGRAM(s) Oral two times a day PRN  ondansetron Injectable 4 milliGRAM(s) IV Push every 8 hours PRN      VITAL SIGNS: Last 24 Hours  T(C): 36.2 (21 Apr 2025 05:00), Max: 36.7 (20 Apr 2025 20:36)  T(F): 97.1 (21 Apr 2025 05:00), Max: 98 (20 Apr 2025 20:36)  HR: 99 (21 Apr 2025 05:00) (92 - 103)  BP: 124/76 (21 Apr 2025 05:00) (119/74 - 132/84)  BP(mean): --  RR: 18 (21 Apr 2025 05:00) (18 - 18)  SpO2: 96% (21 Apr 2025 05:00) (93% - 97%)    LABS:                        15.6   13.86 )-----------( 247      ( 20 Apr 2025 05:53 )             44.8     04-20    143  |  106  |  11  ----------------------------<  103[H]  3.1[L]   |  20  |  1.0    Ca    9.0      20 Apr 2025 05:53  Mg     2.0     04-20    TPro  7.3  /  Alb  3.8  /  TBili  1.5[H]  /  DBili  x   /  AST  50[H]  /  ALT  17  /  AlkPhos  92  04-20      Urinalysis Basic - ( 20 Apr 2025 05:53 )    Color: x / Appearance: x / SG: x / pH: x  Gluc: 103 mg/dL / Ketone: x  / Bili: x / Urobili: x   Blood: x / Protein: x / Nitrite: x   Leuk Esterase: x / RBC: x / WBC x   Sq Epi: x / Non Sq Epi: x / Bacteria: x            Urinalysis with Rflx Culture (collected 18 Apr 2025 13:13)              PHYSICAL EXAM:  GENERAL: NAD,  HEAD:  Atraumatic, Normocephalic. Hard of hearing   EYES: conjunctiva and sclera clear  CHEST/LUNG: GBAE. No wheezing or crackles   HEART: regular rate and rhythm; S1/S2.  ABDOMEN: Soft, Nontender, Nondistended  EXTREMITIES: No edema.   PSYCH: AAOx1.  NEUROLOGY: non-focal; moves all extremities

## 2025-04-22 NOTE — PROGRESS NOTE ADULT - SUBJECTIVE AND OBJECTIVE BOX
TOÑO HORNER 89y Male  MRN#: [id number]   Hospital Day: 3d    HPI:   89 yo M pmhx htn presenting to the ED for evaluation. pt son at bedside endorsing he has been more confused and speaking "gibberish". pt fell 2 weeks ago, diagnosed with 4 R sided rib fractures outpatient. pt also with urinary retention, seen in the ED earlier today and had anaya placed. Denies fever, chills, abd pain, chest pain, calf pain, nausea, vomiting, headache.      · BP Systolic	125 mm Hg  · BP Diastolic	73 mm Hg  · Heart Rate	 101 /min  · Respiration Rate (breaths/min)	18 /min  · Temp (F)	97.7 Degrees F  · Temp (C)	36.5 Degrees C  · Temp site	oral  · SpO2 (%)	96 %  · O2 Delivery/Oxygen Delivery Method	room air  · Temp at ED Arrival (C)	36.5 Degrees C    Labs: Hb 15.2> 13.2,  Troponin 48>40, UA : bloody (traumatic )     CT head:    Bones are diffusely osteopenic.    No evidence of acute compression deformity or facet subluxation.    Dens is intact. No thickening of the prevertebral soft tissues.    Multilevel degenerative changes.    Small scattered slightly ill-defined lucencies throughout the vertebral   column. For example posterior aspect of the dens left aspect of C2.    Heterogeneous left thyroid lobe goiter, partially imaged       (19 Apr 2025 03:48)      SUBJECTIVE      OBJECTIVE  PAST MEDICAL & SURGICAL HISTORY  Hypertension      ALLERGIES:  No Known Allergies    MEDICATIONS:  STANDING MEDICATIONS  brimonidine 0.2% Ophthalmic Solution 1 Drop(s) Both EYES two times a day  chlorhexidine 2% Cloths 1 Application(s) Topical daily  enoxaparin Injectable 40 milliGRAM(s) SubCutaneous every 24 hours  latanoprost 0.005% Ophthalmic Solution 1 Drop(s) Both EYES at bedtime  NIFEdipine XL 30 milliGRAM(s) Oral daily  tamsulosin 0.4 milliGRAM(s) Oral at bedtime  timolol 0.5% Solution 1 Drop(s) Both EYES two times a day    PRN MEDICATIONS  acetaminophen     Tablet .. 650 milliGRAM(s) Oral every 6 hours PRN  aluminum hydroxide/magnesium hydroxide/simethicone Suspension 30 milliLiter(s) Oral every 4 hours PRN      VITAL SIGNS: Last 24 Hours  T(C): 36.7 (22 Apr 2025 05:00), Max: 37.1 (21 Apr 2025 20:54)  T(F): 98.1 (22 Apr 2025 05:00), Max: 98.8 (21 Apr 2025 20:54)  HR: 89 (22 Apr 2025 05:00) (69 - 90)  BP: 146/84 (22 Apr 2025 05:00) (126/85 - 149/87)  BP(mean): 104 (22 Apr 2025 05:00) (104 - 104)  RR: 18 (22 Apr 2025 05:00) (18 - 18)  SpO2: 96% (22 Apr 2025 05:00) (95% - 96%)    LABS:                        14.9   11.01 )-----------( 203      ( 21 Apr 2025 06:55 )             42.8     04-21    141  |  108  |  17  ----------------------------<  102[H]  3.6   |  22  |  1.0    Ca    8.7      21 Apr 2025 06:55  Mg     2.1     04-21    TPro  6.6  /  Alb  3.6  /  TBili  1.0  /  DBili  x   /  AST  37  /  ALT  17  /  AlkPhos  85  04-21      Urinalysis Basic - ( 21 Apr 2025 06:55 )    Color: x / Appearance: x / SG: x / pH: x  Gluc: 102 mg/dL / Ketone: x  / Bili: x / Urobili: x   Blood: x / Protein: x / Nitrite: x   Leuk Esterase: x / RBC: x / WBC x   Sq Epi: x / Non Sq Epi: x / Bacteria: x                    PHYSICAL EXAM:  GENERAL: NAD, well-developed.  HEAD:  Atraumatic, Normocephalic.  EYES: conjunctiva and sclera clear  CHEST/LUNG: GBAE. No wheezing or crackles   HEART: regular rate and rhythm; S1/S2.  ABDOMEN: Soft, Nontender, Nondistended  EXTREMITIES: No edema.   PSYCH: AAOx3.  NEUROLOGY: non-focal; moves all extremities     TOÑO HORNER 89y Male  MRN#: [id number]   Hospital Day: 3d    HPI:   89 yo M pmhx htn presenting to the ED for evaluation. pt son at bedside endorsing he has been more confused and speaking "gibberish". pt fell 2 weeks ago, diagnosed with 4 R sided rib fractures outpatient. pt also with urinary retention, seen in the ED earlier today and had anaya placed. Denies fever, chills, abd pain, chest pain, calf pain, nausea, vomiting, headache.      · BP Systolic	125 mm Hg  · BP Diastolic	73 mm Hg  · Heart Rate	 101 /min  · Respiration Rate (breaths/min)	18 /min  · Temp (F)	97.7 Degrees F  · Temp (C)	36.5 Degrees C  · Temp site	oral  · SpO2 (%)	96 %  · O2 Delivery/Oxygen Delivery Method	room air  · Temp at ED Arrival (C)	36.5 Degrees C    Labs: Hb 15.2> 13.2,  Troponin 48>40, UA : bloody (traumatic )     CT head:    Bones are diffusely osteopenic.    No evidence of acute compression deformity or facet subluxation.    Dens is intact. No thickening of the prevertebral soft tissues.    Multilevel degenerative changes.    Small scattered slightly ill-defined lucencies throughout the vertebral   column. For example posterior aspect of the dens left aspect of C2.    Heterogeneous left thyroid lobe goiter, partially imaged       (19 Apr 2025 03:48)      SUBJECTIVE  Pt seen and evaluated at bedside. No acute overnight events.     OBJECTIVE  PAST MEDICAL & SURGICAL HISTORY  Hypertension      ALLERGIES:  No Known Allergies    MEDICATIONS:  STANDING MEDICATIONS  brimonidine 0.2% Ophthalmic Solution 1 Drop(s) Both EYES two times a day  chlorhexidine 2% Cloths 1 Application(s) Topical daily  enoxaparin Injectable 40 milliGRAM(s) SubCutaneous every 24 hours  latanoprost 0.005% Ophthalmic Solution 1 Drop(s) Both EYES at bedtime  NIFEdipine XL 30 milliGRAM(s) Oral daily  tamsulosin 0.4 milliGRAM(s) Oral at bedtime  timolol 0.5% Solution 1 Drop(s) Both EYES two times a day    PRN MEDICATIONS  acetaminophen     Tablet .. 650 milliGRAM(s) Oral every 6 hours PRN  aluminum hydroxide/magnesium hydroxide/simethicone Suspension 30 milliLiter(s) Oral every 4 hours PRN      VITAL SIGNS: Last 24 Hours  T(C): 36.7 (22 Apr 2025 05:00), Max: 37.1 (21 Apr 2025 20:54)  T(F): 98.1 (22 Apr 2025 05:00), Max: 98.8 (21 Apr 2025 20:54)  HR: 89 (22 Apr 2025 05:00) (69 - 90)  BP: 146/84 (22 Apr 2025 05:00) (126/85 - 149/87)  BP(mean): 104 (22 Apr 2025 05:00) (104 - 104)  RR: 18 (22 Apr 2025 05:00) (18 - 18)  SpO2: 96% (22 Apr 2025 05:00) (95% - 96%)    LABS:                        14.9   11.01 )-----------( 203      ( 21 Apr 2025 06:55 )             42.8     04-21    141  |  108  |  17  ----------------------------<  102[H]  3.6   |  22  |  1.0    Ca    8.7      21 Apr 2025 06:55  Mg     2.1     04-21    TPro  6.6  /  Alb  3.6  /  TBili  1.0  /  DBili  x   /  AST  37  /  ALT  17  /  AlkPhos  85  04-21      Urinalysis Basic - ( 21 Apr 2025 06:55 )    Color: x / Appearance: x / SG: x / pH: x  Gluc: 102 mg/dL / Ketone: x  / Bili: x / Urobili: x   Blood: x / Protein: x / Nitrite: x   Leuk Esterase: x / RBC: x / WBC x   Sq Epi: x / Non Sq Epi: x / Bacteria: x                    PHYSICAL EXAM:  GENERAL: NAD, well-developed.  HEAD:  Atraumatic, Normocephalic.  EYES: conjunctiva and sclera clear  CHEST/LUNG: GBAE. No wheezing or crackles   HEART: regular rate and rhythm; S1/S2.  ABDOMEN: Soft, Nontender, Nondistended  EXTREMITIES: No edema.   PSYCH: AAOx1.  NEUROLOGY: non-focal; moves all extremities

## 2025-04-22 NOTE — PROGRESS NOTE ADULT - SUBJECTIVE AND OBJECTIVE BOX
Patient is a 89y old  Male who presents with a chief complaint of hematuria (22 Apr 2025 06:39)      OVERNIGHT EVENTS: reported urinary retention     SUBJECTIVE / INTERVAL HPI: Patient seen and examined at bedside.     VITAL SIGNS:  Vital Signs Last 24 Hrs  T(C): 36.4 (22 Apr 2025 12:00), Max: 37.1 (21 Apr 2025 20:54)  T(F): 97.5 (22 Apr 2025 12:00), Max: 98.8 (21 Apr 2025 20:54)  HR: 90 (22 Apr 2025 12:00) (89 - 90)  BP: 114/72 (22 Apr 2025 12:00) (114/72 - 149/87)  BP(mean): 104 (22 Apr 2025 05:00) (104 - 104)  RR: 18 (22 Apr 2025 12:00) (18 - 18)  SpO2: 96% (22 Apr 2025 05:00) (95% - 96%)    Parameters below as of 21 Apr 2025 20:54  Patient On (Oxygen Delivery Method): room air        PHYSICAL EXAM:    General: old male not in distress   HEENT: NC/AT; PERRL, clear conjunctiva  Neck: supple  Cardiovascular: +S1/S2; RRR  Respiratory: CTA b/l; no W/R/R  Gastrointestinal: soft, NT/ND; +BSx4  Extremities: WWP; 2+ peripheral pulses; no edema   Neurological: alert and awake, oriented to name only, moves all exts,  no focal deficits    MEDICATIONS:  MEDICATIONS  (STANDING):  brimonidine 0.2% Ophthalmic Solution 1 Drop(s) Both EYES two times a day  chlorhexidine 2% Cloths 1 Application(s) Topical daily  enoxaparin Injectable 40 milliGRAM(s) SubCutaneous every 24 hours  gabapentin 100 milliGRAM(s) Oral three times a day  latanoprost 0.005% Ophthalmic Solution 1 Drop(s) Both EYES at bedtime  NIFEdipine XL 30 milliGRAM(s) Oral daily  polyethylene glycol 3350 17 Gram(s) Oral two times a day  senna 2 Tablet(s) Oral at bedtime  tamsulosin 0.8 milliGRAM(s) Oral at bedtime  timolol 0.5% Solution 1 Drop(s) Both EYES two times a day    MEDICATIONS  (PRN):  acetaminophen     Tablet .. 650 milliGRAM(s) Oral every 6 hours PRN Temp greater or equal to 38C (100.4F), Mild Pain (1 - 3)  aluminum hydroxide/magnesium hydroxide/simethicone Suspension 30 milliLiter(s) Oral every 4 hours PRN Dyspepsia      ALLERGIES:  Allergies    No Known Allergies    Intolerances        LABS:                        14.9   11.01 )-----------( 203      ( 21 Apr 2025 06:55 )             42.8     04-21    141  |  108  |  17  ----------------------------<  102[H]  3.6   |  22  |  1.0    Ca    8.7      21 Apr 2025 06:55  Mg     2.1     04-21    TPro  6.6  /  Alb  3.6  /  TBili  1.0  /  DBili  x   /  AST  37  /  ALT  17  /  AlkPhos  85  04-21      Urinalysis Basic - ( 21 Apr 2025 06:55 )    Color: x / Appearance: x / SG: x / pH: x  Gluc: 102 mg/dL / Ketone: x  / Bili: x / Urobili: x   Blood: x / Protein: x / Nitrite: x   Leuk Esterase: x / RBC: x / WBC x   Sq Epi: x / Non Sq Epi: x / Bacteria: x      CAPILLARY BLOOD GLUCOSE          RADIOLOGY & ADDITIONAL TESTS: Reviewed.    ASSESSMENT:    PLAN:

## 2025-04-22 NOTE — PROGRESS NOTE ADULT - ASSESSMENT
89 yo M pmhx htn presenting to the ED for evaluation. pt son at bedside endorsing he has been more confused and speaking "gibberish". pt fell 2 weeks ago, diagnosed with 4 R sided rib fractures outpatient. pt also with urinary retention, seen in the ED earlier today and had anaya placed.       # worsening dementia   # reported Agitation   # H/o Multiple fall, deconditioning   -- trauma tabor neg   -- Fall precautions   -- gabapentin 100mg tid   - PT/ Rehab eval   -  / case management for placement     # Urinary retention started at home  # Hematuria ( traumatic - patient pulled anaya)  - stated Flomax   - might need anaya, get renal US     # prolong QTc:  - repeat EKG  - avoid QTc prolong meds     # incidental CT scan findings:    --  Focal area of subcortical white matter hypoattenuation in the left frontoparietal region with questionable associated cortical involvement.    Findings are nonspecific could represent chronic infarct, focal white matter changes including chronic microvascular ischemic changes, focal   gliosis, focal demyelination, If symptoms persist nonemergent outpatient brain MRI can be considered.  -- Few ill-defined though nonaggressive appearing lucencies in the   calvarium largest in the left frontal region.   -- Few scattered lucencies throughout the vertebral column in conjunction with the above findings, nonspecific though can consider   multiple myeloma if within goc as outpt       HTN   -c/w Nifedipine XL 30mg    DVT prophylaxis lovenox     pending DC process, placement   DNR/DNI  89 yo M pmhx htn presenting to the ED for evaluation. pt son at bedside endorsing he has been more confused and speaking "gibberish". pt fell 2 weeks ago, diagnosed with 4 R sided rib fractures outpatient. pt also with urinary retention, seen in the ED earlier today and had anaya placed.       # worsening dementia   # reported Agitation   # H/o Multiple fall, deconditioning   -- trauma tabor neg   -- Fall precautions   -- gabapentin 100mg tid   - PT/ Rehab eval   -  / case management for placement     # Urinary retention started at home  # Hematuria ( traumatic - patient pulled anaya)  - stated Flomax   - might need anaya, US w no hydro or obstruction     # prolong QTc:  - repeat EKG  - avoid QTc prolong meds     # incidental CT scan findings:    --  Focal area of subcortical white matter hypoattenuation in the left frontoparietal region with questionable associated cortical involvement.    Findings are nonspecific could represent chronic infarct, focal white matter changes including chronic microvascular ischemic changes, focal   gliosis, focal demyelination, If symptoms persist nonemergent outpatient brain MRI can be considered.  -- Few ill-defined though nonaggressive appearing lucencies in the   calvarium largest in the left frontal region.   -- Few scattered lucencies throughout the vertebral column in conjunction with the above findings, nonspecific though can consider   multiple myeloma if within goc as outpt       HTN   -c/w Nifedipine XL 30mg    DVT prophylaxis lovenox     pending DC process, placement   DNR/DNI

## 2025-04-22 NOTE — PROGRESS NOTE ADULT - ASSESSMENT
89 y/o M with pmhx htn, hearing loss dementia axox1 at b/l, p/w ams and agitation. Pt was trying to pull out anaya at home and it bled.    #Moderate to severe dementia w. behavioral disturbances   #H/o multiple falls   - CTH -ve   - consider gabpentin 300, benadryl, or ativan for agitation, pt has prolonged qtc     #Urinary retention   #Traumatic hematuria   - Anaya DC'ed TOV   - c/w tamsulosin     #HTN  - c/w nifedipine 30    #DVT PPx- LVNX  #GI PPx-  #Diet- DASH  #Dispo- DC to [facility] likely 4/22  #Code- FULL   89 y/o M with pmhx htn, hearing loss dementia axox1 at b/l, p/w ams and agitation. Pt was trying to pull out anaya at home and it bled.    #Moderate to severe dementia w. behavioral disturbances   #H/o multiple falls   - CTH -ve   - c/w gabpentin 100 TID pt has prolonged qtc     #Urinary retention   #Traumatic hematuria   - TOV   - c/w tamsulosin increased to 0.8    #HTN  - c/w nifedipine 30    #DVT PPx- LVNX  #GI PPx-  #Diet- DASH  #Dispo- DC to [facility] likely 4/22  #Code- FULL

## 2025-04-23 NOTE — PROGRESS NOTE ADULT - ASSESSMENT
89 y/o M with pmhx htn, hearing loss dementia axox1 at b/l, p/w ams and agitation. Pt was trying to pull out anaya at home and it bled.    #Moderate to severe dementia w. behavioral disturbances   #H/o multiple falls   - CTH -ve   - c/w gabpentin 100 TID pt has prolonged qtc     #Urinary retention   #Traumatic hematuria   - TOV   - c/w tamsulosin increased to 0.8    #HTN  - c/w nifedipine 30    #DVT PPx- LVNX  #GI PPx-  #Diet- DASH  #Dispo- DC to [facility] likely 4/23 pending bowel movement and voiding   #Code- FULL   89 y/o M with pmhx htn, hearing loss dementia axox1 at b/l, p/w ams and agitation. Pt was trying to pull out anaya at home and it bled.    #Moderate to severe dementia w. behavioral disturbances   #H/o multiple falls   - CTH -ve   - c/w gabpentin 100 TID pt has prolonged qtc   - f/u psych c/s     #Urinary retention   #Traumatic hematuria   - failed TOV, bladder scn reads are false  - c/w tamsulosin increased to 0.8  - will need folwy pending psych eval for agitation    #Constipation  - KUB with stool burden  - c/w bowel regemin   - trial of lactulose 4/23    #HTN  - c/w nifedipine 30    #DVT PPx- LVNX  #GI PPx-  #Diet- DASH  #Dispo- DC to [facility] likely 4/24 pending bowel movement,voiding , and psych  #Code- DNR/DNI   89 y/o M with pmhx htn, hearing loss dementia axox1 at b/l, p/w ams and agitation. Pt was trying to pull out anaya at home and it bled.      #Chest pain while working with PT  - Per RN pt was having cp whil working with pt  - Pt is asymptomatic now on RA  - EKG showed RBBB (chronic) and sinus david to 49bpm  - f/u tte   - f/u trops  - f/u d-dimer  - f/u duplex   - will need tele    #Moderate to severe dementia w. behavioral disturbances   #H/o multiple falls   - CTH -ve   - c/w gabpentin 100 TID pt has prolonged qtc   - PSYCH: ativan 0.5 mg q8 prn, depakote 250 q12, abilify 2.5 q8 PRN    #Urinary retention   #Traumatic hematuria   - failed TOV, bladder scn reads are false  - c/w tamsulosin increased to 0.8  - will need folwy pending psych eval for agitation    #Constipation  - KUB with stool burden  - c/w bowel regemin   - trial of lactulose 4/23    #HTN  - c/w nifedipine 30    #DVT PPx- LVNX  #GI PPx-  #Diet- DASH  #Dispo- Tele, DC to [facility] likely 4/24 pending bowel movement,voiding , tte, trops, dimer, duplex  #Code- DNR/DNI

## 2025-04-23 NOTE — BH CONSULTATION LIAISON ASSESSMENT NOTE - NSBHREFERDETAILS_PSY_A_CORE_FT
Per consult: "agitatio axox1 at baseline interefrec with medical treatment, pulled out anaya x 2 qtc 522"

## 2025-04-23 NOTE — BH CONSULTATION LIAISON ASSESSMENT NOTE - NSBHATTESTCOMMENTATTENDFT_PSY_A_CORE
Patient has significant cognitive deficits on interview, although did not seem agitated and was calm and cooperative. Due to his dementia --he likely at risk for sundowning, impulsivity, and require a calm/patient approach for redirection. Given his prolonged qtc, using low dosage of scheduled depakote would be preferred to help reduce agitation and impulsivity. For emergent breakthrough agitation, can use prn abilify or IM benzos.

## 2025-04-23 NOTE — BH CONSULTATION LIAISON ASSESSMENT NOTE - CURRENT MEDICATION
MEDICATIONS  (STANDING):  brimonidine 0.2% Ophthalmic Solution 1 Drop(s) Both EYES two times a day  chlorhexidine 2% Cloths 1 Application(s) Topical daily  enoxaparin Injectable 40 milliGRAM(s) SubCutaneous every 24 hours  gabapentin 100 milliGRAM(s) Oral three times a day  latanoprost 0.005% Ophthalmic Solution 1 Drop(s) Both EYES at bedtime  NIFEdipine XL 30 milliGRAM(s) Oral daily  polyethylene glycol 3350 17 Gram(s) Oral two times a day  senna 2 Tablet(s) Oral at bedtime  tamsulosin 0.8 milliGRAM(s) Oral at bedtime  timolol 0.5% Solution 1 Drop(s) Both EYES two times a day    MEDICATIONS  (PRN):  acetaminophen     Tablet .. 650 milliGRAM(s) Oral every 6 hours PRN Temp greater or equal to 38C (100.4F), Mild Pain (1 - 3)  aluminum hydroxide/magnesium hydroxide/simethicone Suspension 30 milliLiter(s) Oral every 4 hours PRN Dyspepsia

## 2025-04-23 NOTE — BH CONSULTATION LIAISON ASSESSMENT NOTE - NSBHCONSULTMEDAGITATION_PSY_A_CORE FT
aripiprazole 2.5 mg PO q8 for agitation   If he does not tolerate PO: lorazepam 0.5 mg q 8 IM aripiprazole 2.5 mg PO q8 for agitation   If he does not tolerate PO: lorazepam 0.5 mg q 8 IM OR versed 1 mg IM if ativan unavailable

## 2025-04-23 NOTE — BH CONSULTATION LIAISON ASSESSMENT NOTE - SUMMARY
89-year-old male with a PMH of HTN, hearing loss, and dementia (baseline A&Ox1), presenting with altered mental status and recent agitation secondary to urinary retention. Flores catheter was placed on admission. Psychiatry was consulted for agitation.     Patient readily engaged with team. Answers were illogical and disorganized and patient appeared anxious/overwhelmed. Per medical team, he has been pulling at his catheter and being uncooperative with treatment. Patient's presentation is likely secondary to his neurocognitive decline and IPP is not indicated for management of his symptoms once medically cleared. Depakote may be used to manage his impulsivity/agitation as it may be interfering with his medical treatment. He is at an increased risk for delirium and non-pharmacological interventions should be used. Given his prolonged QTc, should avoid IM antipsychotics for agitation. Patient would benefit from ongoing outpatient support for his behavioral disturbances.

## 2025-04-23 NOTE — BH CONSULTATION LIAISON ASSESSMENT NOTE - RISK ASSESSMENT
Patient is at increased risk of inadvertent self-harm due to dementia. However, he has very strong support from his family who live in the same home.

## 2025-04-23 NOTE — BH CONSULTATION LIAISON ASSESSMENT NOTE - NSBHCHARTREVIEWVS_PSY_A_CORE FT
Vital Signs Last 24 Hrs  T(C): 36.4 (23 Apr 2025 10:23), Max: 36.7 (23 Apr 2025 05:00)  T(F): 97.5 (23 Apr 2025 10:23), Max: 98 (23 Apr 2025 05:00)  HR: 53 (23 Apr 2025 10:23) (53 - 90)  BP: 116/68 (23 Apr 2025 10:23) (116/68 - 129/82)  BP(mean): 90 (23 Apr 2025 05:00) (90 - 90)  RR: 16 (23 Apr 2025 10:23) (16 - 18)  SpO2: 94% (23 Apr 2025 10:23) (94% - 96%)    Parameters below as of 23 Apr 2025 10:23  Patient On (Oxygen Delivery Method): room air

## 2025-04-23 NOTE — BH CONSULTATION LIAISON ASSESSMENT NOTE - NSBHATTESTAPPAMEND_PSY_A_CORE
I have personally seen and examined this patient. I fully participated in the care of this patient. I have made amendments to the documentation where appropriate and otherwise agree with the history, physical exam, and plan as documented by the MARY

## 2025-04-23 NOTE — BH CONSULTATION LIAISON ASSESSMENT NOTE - NSBHCONSULTRECOMMENDOTHER_PSY_A_CORE FT
- initiate depakote 250 mg BID for behavioral disturbances  - use aripiprazole 2.5 mg PO q8 for agitation; if unable to tolerate PO, use lorazepam 0.5 mg IM q8 PRN. Extreme caution with other antipsychotics given his QTc. Aripiprazole does not increase QTc.   - patient at significant risk for delirium; please implement non-pharmacological interventions    Recommendations to nursing for non-pharmacological interventions for delirium management:  1.	Reorient Frequently   2.	Encourage Early Mobility   3.	Encourage talking to patient prior to hands on care    4.	Prevent overstimulation     5.	Prevent Day night Reversal : Out of bed at least 3 times during the day. Can be sitting in chair or walking in unit with assistance or even sitting on bed   6.	Curtains up from 8 am to 8 pm    7.	At least 6 hours of uninterrupted sleep at night    8.	Avoid Benzodiazepines, Anticholinergics and antihistaminergics   9.	Avoid Restraints : if needed utilize the least restrictive form : 1:1  <hand mitts< 2 point soft < 4 point soft < 2 point hard <  4 point hard    10.	Treat underlying cause    11.	Maintain K>4 and Mg >2 at all times

## 2025-04-23 NOTE — PROGRESS NOTE ADULT - SUBJECTIVE AND OBJECTIVE BOX
TOÑO HORNER 89y Male  MRN#: [id number]   Hospital Day: 4d    HPI:   89 yo M pmhx htn presenting to the ED for evaluation. pt son at bedside endorsing he has been more confused and speaking "gibberish". pt fell 2 weeks ago, diagnosed with 4 R sided rib fractures outpatient. pt also with urinary retention, seen in the ED earlier today and had anaya placed. Denies fever, chills, abd pain, chest pain, calf pain, nausea, vomiting, headache.      · BP Systolic	125 mm Hg  · BP Diastolic	73 mm Hg  · Heart Rate	 101 /min  · Respiration Rate (breaths/min)	18 /min  · Temp (F)	97.7 Degrees F  · Temp (C)	36.5 Degrees C  · Temp site	oral  · SpO2 (%)	96 %  · O2 Delivery/Oxygen Delivery Method	room air  · Temp at ED Arrival (C)	36.5 Degrees C    Labs: Hb 15.2> 13.2,  Troponin 48>40, UA : bloody (traumatic )     CT head:    Bones are diffusely osteopenic.    No evidence of acute compression deformity or facet subluxation.    Dens is intact. No thickening of the prevertebral soft tissues.    Multilevel degenerative changes.    Small scattered slightly ill-defined lucencies throughout the vertebral   column. For example posterior aspect of the dens left aspect of C2.    Heterogeneous left thyroid lobe goiter, partially imaged       (19 Apr 2025 03:48)      SUBJECTIVE      OBJECTIVE  PAST MEDICAL & SURGICAL HISTORY  Hypertension      ALLERGIES:  No Known Allergies    MEDICATIONS:  STANDING MEDICATIONS  brimonidine 0.2% Ophthalmic Solution 1 Drop(s) Both EYES two times a day  chlorhexidine 2% Cloths 1 Application(s) Topical daily  enoxaparin Injectable 40 milliGRAM(s) SubCutaneous every 24 hours  gabapentin 100 milliGRAM(s) Oral three times a day  latanoprost 0.005% Ophthalmic Solution 1 Drop(s) Both EYES at bedtime  NIFEdipine XL 30 milliGRAM(s) Oral daily  polyethylene glycol 3350 17 Gram(s) Oral two times a day  senna 2 Tablet(s) Oral at bedtime  tamsulosin 0.8 milliGRAM(s) Oral at bedtime  timolol 0.5% Solution 1 Drop(s) Both EYES two times a day    PRN MEDICATIONS  acetaminophen     Tablet .. 650 milliGRAM(s) Oral every 6 hours PRN  aluminum hydroxide/magnesium hydroxide/simethicone Suspension 30 milliLiter(s) Oral every 4 hours PRN      VITAL SIGNS: Last 24 Hours  T(C): 36.7 (23 Apr 2025 05:00), Max: 36.7 (23 Apr 2025 05:00)  T(F): 98 (23 Apr 2025 05:00), Max: 98 (23 Apr 2025 05:00)  HR: 90 (23 Apr 2025 05:00) (85 - 90)  BP: 119/76 (23 Apr 2025 05:00) (114/72 - 129/82)  BP(mean): 90 (23 Apr 2025 05:00) (90 - 90)  RR: 18 (23 Apr 2025 05:00) (18 - 18)  SpO2: 96% (23 Apr 2025 05:00) (94% - 96%)    LABS:                        14.9   11.01 )-----------( 203      ( 21 Apr 2025 06:55 )             42.8     04-21    141  |  108  |  17  ----------------------------<  102[H]  3.6   |  22  |  1.0    Ca    8.7      21 Apr 2025 06:55  Mg     2.1     04-21    TPro  6.6  /  Alb  3.6  /  TBili  1.0  /  DBili  x   /  AST  37  /  ALT  17  /  AlkPhos  85  04-21      Urinalysis Basic - ( 21 Apr 2025 06:55 )    Color: x / Appearance: x / SG: x / pH: x  Gluc: 102 mg/dL / Ketone: x  / Bili: x / Urobili: x   Blood: x / Protein: x / Nitrite: x   Leuk Esterase: x / RBC: x / WBC x   Sq Epi: x / Non Sq Epi: x / Bacteria: x                    PHYSICAL EXAM:  GENERAL: NAD, well-developed.  HEAD:  Atraumatic, Normocephalic.  EYES: conjunctiva and sclera clear  CHEST/LUNG: GBAE. No wheezing or crackles   HEART: regular rate and rhythm; S1/S2.  ABDOMEN: Soft, Nontender, Nondistended  EXTREMITIES: No edema.   PSYCH: AAOx3.  NEUROLOGY: non-focal; moves all extremities     TOÑO HORNER 89y Male  MRN#: [id number]   Hospital Day: 4d    HPI:   89 yo M pmhx htn presenting to the ED for evaluation. pt son at bedside endorsing he has been more confused and speaking "gibberish". pt fell 2 weeks ago, diagnosed with 4 R sided rib fractures outpatient. pt also with urinary retention, seen in the ED earlier today and had anaya placed. Denies fever, chills, abd pain, chest pain, calf pain, nausea, vomiting, headache.      · BP Systolic	125 mm Hg  · BP Diastolic	73 mm Hg  · Heart Rate	 101 /min  · Respiration Rate (breaths/min)	18 /min  · Temp (F)	97.7 Degrees F  · Temp (C)	36.5 Degrees C  · Temp site	oral  · SpO2 (%)	96 %  · O2 Delivery/Oxygen Delivery Method	room air  · Temp at ED Arrival (C)	36.5 Degrees C    Labs: Hb 15.2> 13.2,  Troponin 48>40, UA : bloody (traumatic )     CT head:    Bones are diffusely osteopenic.    No evidence of acute compression deformity or facet subluxation.    Dens is intact. No thickening of the prevertebral soft tissues.    Multilevel degenerative changes.    Small scattered slightly ill-defined lucencies throughout the vertebral   column. For example posterior aspect of the dens left aspect of C2.    Heterogeneous left thyroid lobe goiter, partially imaged       (19 Apr 2025 03:48)      SUBJECTIVE  Pt seen and evaluated at bedside. Pulled put anaya ove night     OBJECTIVE  PAST MEDICAL & SURGICAL HISTORY  Hypertension      ALLERGIES:  No Known Allergies    MEDICATIONS:  STANDING MEDICATIONS  brimonidine 0.2% Ophthalmic Solution 1 Drop(s) Both EYES two times a day  chlorhexidine 2% Cloths 1 Application(s) Topical daily  enoxaparin Injectable 40 milliGRAM(s) SubCutaneous every 24 hours  gabapentin 100 milliGRAM(s) Oral three times a day  latanoprost 0.005% Ophthalmic Solution 1 Drop(s) Both EYES at bedtime  NIFEdipine XL 30 milliGRAM(s) Oral daily  polyethylene glycol 3350 17 Gram(s) Oral two times a day  senna 2 Tablet(s) Oral at bedtime  tamsulosin 0.8 milliGRAM(s) Oral at bedtime  timolol 0.5% Solution 1 Drop(s) Both EYES two times a day    PRN MEDICATIONS  acetaminophen     Tablet .. 650 milliGRAM(s) Oral every 6 hours PRN  aluminum hydroxide/magnesium hydroxide/simethicone Suspension 30 milliLiter(s) Oral every 4 hours PRN      VITAL SIGNS: Last 24 Hours  T(C): 36.7 (23 Apr 2025 05:00), Max: 36.7 (23 Apr 2025 05:00)  T(F): 98 (23 Apr 2025 05:00), Max: 98 (23 Apr 2025 05:00)  HR: 90 (23 Apr 2025 05:00) (85 - 90)  BP: 119/76 (23 Apr 2025 05:00) (114/72 - 129/82)  BP(mean): 90 (23 Apr 2025 05:00) (90 - 90)  RR: 18 (23 Apr 2025 05:00) (18 - 18)  SpO2: 96% (23 Apr 2025 05:00) (94% - 96%)    LABS:                        14.9   11.01 )-----------( 203      ( 21 Apr 2025 06:55 )             42.8     04-21    141  |  108  |  17  ----------------------------<  102[H]  3.6   |  22  |  1.0    Ca    8.7      21 Apr 2025 06:55  Mg     2.1     04-21    TPro  6.6  /  Alb  3.6  /  TBili  1.0  /  DBili  x   /  AST  37  /  ALT  17  /  AlkPhos  85  04-21      Urinalysis Basic - ( 21 Apr 2025 06:55 )    Color: x / Appearance: x / SG: x / pH: x  Gluc: 102 mg/dL / Ketone: x  / Bili: x / Urobili: x   Blood: x / Protein: x / Nitrite: x   Leuk Esterase: x / RBC: x / WBC x   Sq Epi: x / Non Sq Epi: x / Bacteria: x                    PHYSICAL EXAM:  GENERAL: NAD, well-developed.  HEAD:  Atraumatic, Normocephalic.  EYES: conjunctiva and sclera clear  CHEST/LUNG: GBAE. No wheezing or crackles   HEART: regular rate and rhythm; S1/S2.  ABDOMEN: Soft, Nontender, Nondistended  EXTREMITIES: No edema.   PSYCH: AAOx1.  NEUROLOGY: non-focal; moves all extremities

## 2025-04-23 NOTE — BH CONSULTATION LIAISON ASSESSMENT NOTE - HPI (INCLUDE ILLNESS QUALITY, SEVERITY, DURATION, TIMING, CONTEXT, MODIFYING FACTORS, ASSOCIATED SIGNS AND SYMPTOMS)
89-year-old male with a PMH of HTN, hearing loss, and dementia (baseline A&Ox1), presenting with altered mental status and recent agitation secondary to urinary retention. Flores catheter was placed on admission. Psychiatry was consulted for agitation.     On exam, the patient is alert but oriented only to self. He is unable to identify the current date or time and responds to questions with unrelated answers. Patient denies any A/V hallucinations. He is appears anxious but is in behavioral control. Able to discern that he has been "here" for three days but unable to describe location. Patient reports sleeping and feelings safe at hospital. No SI/HI were elicited.     Collateral obtained from Kiley Watkins ([phone]), patient’s daughter-in-law. She reports that the patient lives independently in an upstairs apartment, completes activities of daily living on his own, and regularly interacts with family. She notes that since Friday (04/18), when he began having urinary difficulties, he has appeared increasingly disoriented and confused. No concerns for paranoia or delusions. Family is planning for short term placement at [facility] following discharge.

## 2025-04-24 NOTE — PROGRESS NOTE ADULT - ASSESSMENT
89 yo M pmhx htn presenting to the ED for evaluation. pt son at bedside endorsing he has been more confused and speaking "gibberish". pt fell 2 weeks ago, diagnosed with 4 Right sided rib fractures outpatient. Pt also with urinary retention. Pt had anaya placed in the ER on admission and he removed it and it was replaced again and then he removed again. Now with some light hematuria.   Psychiatry consulted to assist with medication management for Dementia with Agitation vs Delirium as QTc > 500 and can not use usual antipsychotics. On 4/23 pt endorsed some chest pain so transferred to  tele, found to have acute PE.     # Acute PE  -endorsed some chest discomfort when walking with PT 4/23, dimer was elevated, CTA checked showed acute PE  -on RA  -DUplex negative  -f/u TTE  -c/w lovenox therapeutic, will need to transition to DOAC on dc though will need good dispo as pt has hx of recurrent falls, ideally nursing home, would not be safe on DOAC at home   -keep on tele for now     # worsening dementia   # Agitation   # H/o Multiple fall, deconditioning   # Multiple anaya removal by pt w/ trauma and hematuria   -- trauma w/up negative    -- Fall precautions   -- Gabapentin 100mg tid   -- Psychiatry consultation appreciated-will follow med recs, started abilify and depakote   -remains on 1:1    # Urinary retention started at home  # Hematuria ( traumatic - patient pulled anaya)  - c/w Flomax   - US w no hydro or obstruction   - anaya replaced   -c/w mittens   -urology f/u     # incidental CT scan findings:    --  Focal area of subcortical white matter hypoattenuation in the left frontoparietal region with questionable associated cortical involvement.    Findings are nonspecific could represent chronic infarct, focal white matter changes including chronic microvascular ischemic changes, focal   gliosis, focal demyelination, If symptoms persist nonemergent outpatient brain MRI can be considered.  -- Few ill-defined though nonaggressive appearing lucencies in the calvarium largest in the left frontal region.   -- Few scattered lucencies throughout the vertebral column in conjunction with the above findings, nonspecific though can consider multiple myeloma  - if within goc as outpt       HTN   -c/w Nifedipine XL 30mg    DVT prophylaxis lovenox     DNR/DNI    Pending: f/u TTE, started AC, PT f/u, psych following reamins on 1:1, monitor hematuria   Housestaff updated daughter today

## 2025-04-24 NOTE — BH CONSULTATION LIAISON PROGRESS NOTE - NSBHFUPINTERVALHXFT_PSY_A_CORE
Patient is a 89-year-old male with a PMH of HTN, hearing loss, and dementia (baseline A&Ox1), presenting with altered mental status and recent agitation secondary to urinary retention. Anaya catheter was placed on admission. Psychiatry was consulted for agitation.     Pt was seen in bed restrained with hand mitts in place and sitter present. He was sleeping, but arousable to verbal stimuli. He is oriented only to self. He is unable to identify the current date or time and responds to questions with unrelated answers. He is more agitated this morning, yelling and cursing at provider. No SI/HI were elicited.     Per RN, patient pulled out anaya over night.    Patient is a 89-year-old male with a PMH of HTN, hearing loss, and dementia (baseline A&Ox1), presenting with altered mental status and recent agitation secondary to urinary retention. Anaya catheter was placed on admission. Psychiatry was consulted for agitation.     Pt was seen in bed restrained with hand mitts in place and sitter present. He was sleeping, but arousable to verbal stimuli. He is oriented only to self. He is unable to identify the current date or time and responds to questions with unrelated answers. He is more agitated and confused this morning, yelling, and cursing at provider, asking for his hand mitts to be removed. There is now a staff member present for continuous observation to be able to continue medical treatment. No SI/HI were elicited.     Per RN, patient pulled out anaya over night.

## 2025-04-24 NOTE — PHARMACOTHERAPY INTERVENTION NOTE - COMMENTS
Patient with acute PE, started on therapeutic AC with enoxaparin 80 mg sq q12h. Still ordered enoxaparin 40 mg sq q24h, recommend discontinue enoxaparin 40 mg sq q24h order.

## 2025-04-24 NOTE — PROGRESS NOTE ADULT - SUBJECTIVE AND OBJECTIVE BOX
TOÑO HORNER 89y Male  MRN#: [id number]     Hospital Day: 5d      SUBJECTIVE  No acute events overnight, pt seen and examined this morning.                                          ----------------------------------------------------------  OBJECTIVE  PAST MEDICAL & SURGICAL HISTORY  Hypertension                                              -----------------------------------------------------------  ALLERGIES:  No Known Allergies                                            ------------------------------------------------------------    HOME MEDICATIONS  Home Medications:  brimonidine 0.2% ophthalmic solution: 1 drop(s) to each affected eye 2 times a day (21 Apr 2025 09:54)  enoxaparin: 40 milligram(s) subcutaneous once a day (21 Apr 2025 09:54)  latanoprost 0.005% ophthalmic solution: 1 drop(s) to each affected eye once a day (at bedtime) (21 Apr 2025 09:54)  NIFEdipine 30 mg oral tablet, extended release: 1 tab(s) orally once a day (21 Apr 2025 09:54)  tamsulosin 0.4 mg oral capsule: 1 cap(s) orally once a day (at bedtime) (21 Apr 2025 09:54)  timolol maleate 0.5% ophthalmic solution: 1 drop(s) to each affected eye 2 times a day (21 Apr 2025 09:54)                           MEDICATIONS:  STANDING MEDICATIONS  brimonidine 0.2% Ophthalmic Solution 1 Drop(s) Both EYES two times a day  chlorhexidine 2% Cloths 1 Application(s) Topical daily  divalproex  milliGRAM(s) Oral <User Schedule>  divalproex  milliGRAM(s) Oral <User Schedule>  enoxaparin Injectable 80 milliGRAM(s) SubCutaneous every 12 hours  latanoprost 0.005% Ophthalmic Solution 1 Drop(s) Both EYES at bedtime  melatonin 10 milliGRAM(s) Oral at bedtime  NIFEdipine XL 30 milliGRAM(s) Oral daily  polyethylene glycol 3350 17 Gram(s) Oral two times a day  potassium chloride   Powder 40 milliEquivalent(s) Oral once  senna 2 Tablet(s) Oral at bedtime  tamsulosin 0.8 milliGRAM(s) Oral at bedtime  timolol 0.5% Solution 1 Drop(s) Both EYES two times a day    PRN MEDICATIONS  acetaminophen     Tablet .. 650 milliGRAM(s) Oral every 6 hours PRN  aluminum hydroxide/magnesium hydroxide/simethicone Suspension 30 milliLiter(s) Oral every 4 hours PRN  ARIPiprazole 2.5 milliGRAM(s) Oral <User Schedule> PRN                                            ------------------------------------------------------------  VITAL SIGNS: Last 24 Hours  T(C): 36.8 (24 Apr 2025 12:11), Max: 36.8 (24 Apr 2025 04:56)  T(F): 98.2 (24 Apr 2025 12:11), Max: 98.2 (24 Apr 2025 04:56)  HR: 96 (24 Apr 2025 12:11) (51 - 96)  BP: 119/58 (24 Apr 2025 12:11) (90/65 - 150/62)  BP(mean): 56 (24 Apr 2025 12:11) (56 - 73)  RR: 18 (24 Apr 2025 12:11) (16 - 18)  SpO2: 91% (24 Apr 2025 08:06) (91% - 98%)      04-23-25 @ 07:01  -  04-24-25 @ 07:00  --------------------------------------------------------  IN: 118 mL / OUT: 750 mL / NET: -632 mL                                             --------------------------------------------------------------  LABS:                        14.7   10.40 )-----------( 222      ( 24 Apr 2025 05:41 )             42.4     04-24    141  |  105  |  13  ----------------------------<  105[H]  3.4[L]   |  22  |  1.0    Ca    8.9      24 Apr 2025 05:41  Mg     2.2     04-24    TPro  6.5  /  Alb  3.5  /  TBili  1.2  /  DBili  x   /  AST  24  /  ALT  16  /  AlkPhos  93  04-24      Urinalysis Basic - ( 24 Apr 2025 05:41 )    Color: x / Appearance: x / SG: x / pH: x  Gluc: 105 mg/dL / Ketone: x  / Bili: x / Urobili: x   Blood: x / Protein: x / Nitrite: x   Leuk Esterase: x / RBC: x / WBC x   Sq Epi: x / Non Sq Epi: x / Bacteria: x                                                            -------------------------------------------------------------  RADIOLOGY:  CXR      CT  CT Angio Chest PE Protocol w/ IV Cont:   ACC: [id number] EXAM:  CT ANGIO CHEST PULM [facility]   ORDERED BY: SUNIL DELGADO     PROCEDURE DATE:  04/24/2025          INTERPRETATION:  CLINICAL INFORMATION: Chest pain. Elevated d-dimer.    COMPARISON: CT chest 4/19/2025    CONTRAST/COMPLICATIONS:  IV Contrast: Omnipaque 350  65 cc administered   35 cc discarded  Oral Contrast: NONE    PROCEDURE:  CT Angiography of the Chest.  Sagittal and coronal reformats were performed as well as 3D (MIP)   reconstructions.    FINDINGS:    LUNGS AND LARGE AIRWAYS: Patent central airways. Breathing motion   artifact limits evaluation. Grossly unchanged bilateral subpleural   reticulations. Additional areas of atelectasis and scarring bilaterally.  PLEURA: No pleural effusion.  VESSELS: Right interlobar artery embolus with right upper lobe   multisegmental extension. Right lower lobe segmental and subsegmental   emboli. Left lower lobe subsegmental emboli. Aortic and coronary artery   calcifications.  HEART: Stable cardiomegaly No pericardial effusion. No right heart   strain. Aortic valve calcifications.  MEDIASTINUM AND EMMA: Stable heterogeneous left thyroid with substernal   extension.  CHEST WALL AND LOWER NECK: Bilateral gynecomastia.  VISUALIZED UPPER ABDOMEN: Partially visualized large right renal cysts.   Hepatic hypodensities. Cholelithiasis. Small hiatal hernia.  BONES: Degenerative changes. Subacute fractures of right posterior ribs 9   through 12. Additional chronic right-sided rib fractures.    IMPRESSION:  1.  Acute pulmonary emboli in the right interlobar artery, with segmental   extension. Additional more distally located bilateral emboli described   above. No right heart strain.    2.  Subacute fractures of right posterior ribs 9 through 12.    3.  Additional stable findings as above.      Callback request placed at 4:12 AM on 4/24/2025.    --- End of Report ---          DANA BLANCO MD; Resident Radiologist  This document has been electronically signed.  MRAGI CULLEN MD; Attending Radiologist  This document has been electronically signed. Apr 24 2025  7:33AM (04-24-25 @ 03:14)                                            --------------------------------------------------------------    PHYSICAL EXAM:  GENERAL: NAD, lying in bed comfortably  CHEST/LUNG: decreased breath sounds Unlabored respirations  HEART: Regular rate and rhythm; No murmurs, rubs, or gallops  ABDOMEN: Soft, nontender, nondistended  EXTREMITIES:  No clubbing, cyanosis, or edema  NERVOUS SYSTEM:  confused and disoriented

## 2025-04-24 NOTE — PROGRESS NOTE ADULT - SUBJECTIVE AND OBJECTIVE BOX
SUBJECTIVE/OVERNIGHT EVENTS  Today is hospital day 5d. This morning patient was seen and examined at bedside,  No acute or major events overnight.    HPI:   89 yo M pmhx htn presenting to the ED for evaluation. pt son at bedside endorsing he has been more confused and speaking "gibberish". pt fell 2 weeks ago, diagnosed with 4 R sided rib fractures outpatient. pt also with urinary retention, seen in the ED earlier today and had anaya placed. Denies fever, chills, abd pain, chest pain, calf pain, nausea, vomiting, headache.      · BP Systolic	125 mm Hg  · BP Diastolic	73 mm Hg  · Heart Rate	 101 /min  · Respiration Rate (breaths/min)	18 /min  · Temp (F)	97.7 Degrees F  · Temp (C)	36.5 Degrees C  · Temp site	oral  · SpO2 (%)	96 %  · O2 Delivery/Oxygen Delivery Method	room air  · Temp at ED Arrival (C)	36.5 Degrees C    Labs: Hb 15.2> 13.2,  Troponin 48>40, UA : bloody (traumatic )     CT head:    Bones are diffusely osteopenic.    No evidence of acute compression deformity or facet subluxation.    Dens is intact. No thickening of the prevertebral soft tissues.    Multilevel degenerative changes.    Small scattered slightly ill-defined lucencies throughout the vertebral   column. For example posterior aspect of the dens left aspect of C2.    Heterogeneous left thyroid lobe goiter, partially imaged       (19 Apr 2025 03:48)      VITALS  T(F): 98.2 (04-24-25 @ 20:41), Max: 98.2 (04-24-25 @ 04:56)  HR: 97 (04-24-25 @ 20:41) (94 - 97)  BP: 136/86 (04-24-25 @ 20:41) (90/65 - 136/86)  RR: 17 (04-24-25 @ 20:41) (17 - 18)  SpO2: 95% (04-24-25 @ 20:41) (91% - 95%)        MEDICATIONS  STANDING MEDICATIONS  brimonidine 0.2% Ophthalmic Solution 1 Drop(s) Both EYES two times a day  chlorhexidine 2% Cloths 1 Application(s) Topical daily  divalproex  milliGRAM(s) Oral <User Schedule>  divalproex  milliGRAM(s) Oral <User Schedule>  enoxaparin Injectable 80 milliGRAM(s) SubCutaneous every 12 hours  latanoprost 0.005% Ophthalmic Solution 1 Drop(s) Both EYES at bedtime  melatonin 10 milliGRAM(s) Oral at bedtime  NIFEdipine XL 30 milliGRAM(s) Oral daily  polyethylene glycol 3350 17 Gram(s) Oral two times a day  senna 2 Tablet(s) Oral at bedtime  tamsulosin 0.8 milliGRAM(s) Oral at bedtime  timolol 0.5% Solution 1 Drop(s) Both EYES two times a day    PRN MEDICATIONS  acetaminophen     Tablet .. 650 milliGRAM(s) Oral every 6 hours PRN  aluminum hydroxide/magnesium hydroxide/simethicone Suspension 30 milliLiter(s) Oral every 4 hours PRN  ARIPiprazole 2.5 milliGRAM(s) Oral <User Schedule> PRN        PAST MEDICAL & SURGICAL HISTORY:  Hypertension          LABS             14.7   10.40 )-----------( 222      ( 04-24-25 @ 05:41 )             42.4     141  |  105  |  13  -------------------------<  105   04-24-25 @ 05:41  3.4  |  22  |  1.0    Ca      8.9     04-24-25 @ 05:41  Mg     2.2     04-24-25 @ 05:41    TPro  6.5  /  Alb  3.5  /  TBili  1.2  /  DBili  x   /  AST  24  /  ALT  16  /  AlkPhos  93  /  GGT  x     04-24-25 @ 05:41      Troponin T, High Sensitivity Result: 56 ng/L (04-24-25 @ 05:41)  Troponin T, High Sensitivity Result: 50 ng/L (04-24-25 @ 01:00)    Urinalysis Basic - ( 24 Apr 2025 05:41 )    Color: x / Appearance: x / SG: x / pH: x  Gluc: 105 mg/dL / Ketone: x  / Bili: x / Urobili: x   Blood: x / Protein: x / Nitrite: x   Leuk Esterase: x / RBC: x / WBC x   Sq Epi: x / Non Sq Epi: x / Bacteria: x          IMAGING

## 2025-04-24 NOTE — BH CONSULTATION LIAISON PROGRESS NOTE - NSBHATTESTTYPEVISIT_PSY_A_CORE
Attending evaluating patient with MARY ([id number] code) On-site Attending supervising MARY ([id number] codes)

## 2025-04-24 NOTE — BH CONSULTATION LIAISON PROGRESS NOTE - NSBHATTESTBILLING_PSY_A_CORE
[id number] diagnostic evaluation with medical services [id number] OBS or IP - moderate complexity OR 35-49 mins

## 2025-04-24 NOTE — PROGRESS NOTE ADULT - ASSESSMENT
89 yo M pmhx htn presenting to the ED for evaluation. pt son at bedside endorsing he has been more confused and speaking "gibberish". pt fell 2 weeks ago, diagnosed with 4 Right sided rib fractures outpatient. Pt also with urinary retention. Pt had anaya placed in the ER on admission and he removed it and it was replaced again and then he removed again. Now with some light hematuria.   Psychiatry consulted to assist with medication management for Dementia with Agitation vs Delirium as QTc > 500 and can not use usual antipsychotics. On 4/23 pt endorsed some chest pain so transferred to  tele, found to have acute PE.     # Acute low risk PE  -endorsed some chest discomfort when walking with PT 4/23, dimer was elevated  - CTA noted. PE, no right heart strain  - Trop 50 >> 56  - on RA  - DUplex negative  - f/u TTE  -c/w lovenox therapeutic, will need to transition to DOAC on dc though will need good dispo as pt has hx of recurrent falls, ideally nursing home, would not be safe on DOAC at home   -keep on tele for now     # worsening dementia??  # Agitation   # H/o Multiple fall, deconditioning   # Multiple anaya removal by pt w/ trauma and hematuria   - Pet grandaughter/sister - he lives upstairs and is independant with most ADLs (bathing, walking, eating..). This decline represents a significant change from baseline  -- trauma w/up negative    -- Fall precautions   -- Gabapentin 100mg tid   -- Psychiatry consultation appreciated-will follow med recs, started abilify and depakote   -remains on 1:1    # Urinary retention started at home  # Hematuria ( traumatic - patient pulled anaya)  - Possible causes of obstruction include BPH or constipation  - c/w Flomax   - US w no hydro or obstruction   - anaya replaced   -c/w mittens     #Constipation  - C/w bowel regiem    # incidental CT scan findings:    --  Focal area of subcortical white matter hypoattenuation in the left frontoparietal region with questionable associated cortical involvement.    Findings are nonspecific could represent chronic infarct, focal white matter changes including chronic microvascular ischemic changes, focal   gliosis, focal demyelination, If symptoms persist nonemergent outpatient brain MRI can be considered.  -- Few ill-defined though nonaggressive appearing lucencies in the calvarium largest in the left frontal region.   -- Few scattered lucencies throughout the vertebral column in conjunction with the above findings, nonspecific though can consider multiple myeloma  - if within goc as outpt       HTN   -c/w Nifedipine XL 30mg    DVT prophylaxis lovenox     DNR/DNI    Pending: f/u TTE, started AC, PT f/u, psych following reamins on 1:1, monitor hematuria

## 2025-04-24 NOTE — BH CONSULTATION LIAISON PROGRESS NOTE - NSBHCONSULTRECOMMENDOTHER_PSY_A_CORE FT
- initiate depakote 250 mg BID for behavioral disturbances  - use aripiprazole 2.5 mg PO q8 for agitation; if unable to tolerate PO, use lorazepam 0.5 mg IM q8 PRN. Extreme caution with other antipsychotics given his QTc. Aripiprazole does not increase QTc.   - patient at significant risk for delirium; please implement non-pharmacological interventions    Recommendations to nursing for non-pharmacological interventions for delirium management:  1.	Reorient Frequently   2.	Encourage Early Mobility   3.	Encourage talking to patient prior to hands on care    4.	Prevent overstimulation     5.	Prevent Day night Reversal : Out of bed at least 3 times during the day. Can be sitting in chair or walking in unit with assistance or even sitting on bed   6.	Curtains up from 8 am to 8 pm    7.	At least 6 hours of uninterrupted sleep at night    8.	Avoid Benzodiazepines, Anticholinergics and antihistaminergics   9.	Avoid Restraints : if needed utilize the least restrictive form : 1:1  <hand mitts< 2 point soft < 4 point soft < 2 point hard <  4 point hard    10.	Treat underlying cause    11.	Maintain K>4 and Mg >2 at all times  - Increase depakote to 250mg QAM and 500mg QPM for behavioral disturbances  - use aripiprazole 2.5 mg PO q8 for agitation; if unable to tolerate PO, use lorazepam 0.5 mg IM q8 PRN. Aripiprazole does not increase QTc.   - Start Melatonin 10mg QHS for sleep  - patient at significant risk for delirium; please implement non-pharmacological interventions    Recommendations to nursing for non-pharmacological interventions for delirium management:  1.	Reorient Frequently   2.	Encourage Early Mobility   3.	Encourage talking to patient prior to hands on care    4.	Prevent overstimulation     5.	Prevent Day night Reversal : Out of bed at least 3 times during the day. Can be sitting in chair or walking in unit with assistance or even sitting on bed   6.	Curtains up from 8 am to 8 pm    7.	At least 6 hours of uninterrupted sleep at night    8.	Avoid Benzodiazepines, Anticholinergics and antihistaminergics   9.	Avoid Restraints : if needed utilize the least restrictive form : 1:1  <hand mitts< 2 point soft < 4 point soft < 2 point hard <  4 point hard    10.	Treat underlying cause    11.	Maintain K>4 and Mg >2 at all times

## 2025-04-24 NOTE — BH CONSULTATION LIAISON PROGRESS NOTE - NSBHASSESSMENTFT_PSY_ALL_CORE
89-year-old male with a PMH of HTN, hearing loss, and dementia (baseline A&Ox1), presenting with altered mental status and recent agitation secondary to urinary retention. Flores catheter was placed on admission. Psychiatry was consulted for agitation.     Patient disoriented, agitated, and confused during visit. Answers were illogical and disorganized, he kept asking to remove the hand mitts. Per medical team, he has been pulling at his catheter and being uncooperative with treatment. Patient's presentation is likely secondary to his neurocognitive decline and IPP is not indicated for management of his symptoms once medically cleared. Depakote will continue to be utilized to manage his impulsivity/agitation as it is interfering with his medical treatment. Patient may be presenting with delirium, please use caution with benzos. Non-pharmacological interventions should be used. Given his prolonged QTc is now normal, we may consider an SGA for agitation. Patient would benefit from ongoing outpatient support for his behavioral disturbances.

## 2025-04-25 ENCOUNTER — RESULT REVIEW (OUTPATIENT)
Age: 89
End: 2025-04-25

## 2025-04-25 NOTE — BH CONSULTATION LIAISON PROGRESS NOTE - NSBHCONSULTRECOMMENDOTHER_PSY_A_CORE FT
- Increase depakote to 250mg QAM and 500mg QPM for behavioral disturbances  - use aripiprazole 2.5 mg PO q8 for agitation; if unable to tolerate PO, use lorazepam 0.5 mg IM q8 PRN. Aripiprazole does not increase QTc.   - Start Melatonin 10mg QHS for sleep  - patient at significant risk for delirium; please implement non-pharmacological interventions    Recommendations to nursing for non-pharmacological interventions for delirium management:  1.	Reorient Frequently   2.	Encourage Early Mobility   3.	Encourage talking to patient prior to hands on care    4.	Prevent overstimulation     5.	Prevent Day night Reversal : Out of bed at least 3 times during the day. Can be sitting in chair or walking in unit with assistance or even sitting on bed   6.	Curtains up from 8 am to 8 pm    7.	At least 6 hours of uninterrupted sleep at night    8.	Avoid Benzodiazepines, Anticholinergics and antihistaminergics   9.	Avoid Restraints : if needed utilize the least restrictive form : 1:1  <hand mitts< 2 point soft < 4 point soft < 2 point hard <  4 point hard    10.	Treat underlying cause    11.	Maintain K>4 and Mg >2 at all times  - Continue depakote to 250mg QAM and 500mg QPM for behavioral disturbances  - Continue Melatonin 10mg for sleep  - use aripiprazole 2.5 mg PO q8 for agitation; if unable to tolerate PO, use lorazepam 0.5 mg IM q8 PRN. Aripiprazole does not increase QTc.   - patient at significant risk for delirium; please implement non-pharmacological interventions    Recommendations to nursing for non-pharmacological interventions for delirium management:  1.	Reorient Frequently   2.	Encourage Early Mobility   3.	Encourage talking to patient prior to hands on care    4.	Prevent overstimulation     5.	Prevent Day night Reversal : Out of bed at least 3 times during the day. Can be sitting in chair or walking in unit with assistance or even sitting on bed   6.	Curtains up from 8 am to 8 pm    7.	At least 6 hours of uninterrupted sleep at night    8.	Avoid Benzodiazepines, Anticholinergics and antihistaminergics   9.	Avoid Restraints : if needed utilize the least restrictive form : 1:1  <hand mitts< 2 point soft < 4 point soft < 2 point hard <  4 point hard    10.	Treat underlying cause    11.	Maintain K>4 and Mg >2 at all times  - Continue depakote to 250mg QAM and 500mg QPM for behavioral disturbances  - Continue Melatonin 10mg for sleep  - Chexk Valproic Acid Level 4/29 before 6AM dose  - patient at significant risk for delirium; please implement non-pharmacological interventions    Recommendations to nursing for non-pharmacological interventions for delirium management:  1.	Reorient Frequently   2.	Encourage Early Mobility   3.	Encourage talking to patient prior to hands on care    4.	Prevent overstimulation     5.	Prevent Day night Reversal : Out of bed at least 3 times during the day. Can be sitting in chair or walking in unit with assistance or even sitting on bed   6.	Curtains up from 8 am to 8 pm    7.	At least 6 hours of uninterrupted sleep at night    8.	Avoid Benzodiazepines, Anticholinergics and antihistaminergics   9.	Avoid Restraints : if needed utilize the least restrictive form : 1:1  <hand mitts< 2 point soft < 4 point soft < 2 point hard <  4 point hard    10.	Treat underlying cause    11.	Maintain K>4 and Mg >2 at all times

## 2025-04-25 NOTE — BH CONSULTATION LIAISON PROGRESS NOTE - NSBHFUPINTERVALHXFT_PSY_A_CORE
Patient is a 89-year-old male with a PMH of HTN, hearing loss, and dementia (baseline A&Ox1), presenting with altered mental status and recent agitation secondary to urinary retention. Anaya catheter was placed on admission. Psychiatry was consulted for agitation.     Pt was seen in bed restrained with hand mitts in place and sitter present. He was sleeping, but arousable to verbal stimuli. He is oriented only to self. He is unable to identify the current date or time and responds to questions with unrelated answers. He is more pleasant and calm today. He is aware that he's not home, but would like to go home.     Per RN, patient pulled out anaya over night.    Patient is a 89-year-old male with a PMH of HTN, hearing loss, and dementia (baseline A&Ox1), presenting with altered mental status and recent agitation secondary to urinary retention. Flores catheter was placed on admission. Psychiatry was consulted for agitation.     Pt was seen in bed restrained with hand mitts in place and sitter present.  He was sleeping, but arousable to verbal stimuli. Patient is engaged with provider during visit. He is oriented only to self. He is unable to identify the current date or time and responds to questions with unrelated answers. He is more pleasant and calm today. He is aware that he's not home, but would like to go home. He reports dissatisfaction with the B/L hand mitts and wants them removed despite educated on the importance of them for his care.

## 2025-04-25 NOTE — PROGRESS NOTE ADULT - ASSESSMENT
89 yo M pmhx htn presenting to the ED for evaluation. pt son at bedside endorsing he has been more confused and speaking "gibberish". pt fell 2 weeks ago, diagnosed with 4 Right sided rib fractures outpatient. Pt also with urinary retention. Pt had anaya placed in the ER on admission and he removed it and it was replaced again and then he removed again. Now with some light hematuria.   Psychiatry consulted to assist with medication management for Dementia with Agitation vs Delirium as QTc > 500 and can not use usual antipsychotics. On 4/23 pt endorsed some chest pain so transferred to  tele, found to have acute PE.     # Acute PE  -endorsed some chest discomfort when walking with PT 4/23, dimer was elevated, CTA checked showed acute PE  -on RA  -DUplex negative  -f/u TTE  -c/w lovenox therapeutic, will need to transition to DOAC on dc though will need good dispo as pt has hx of recurrent falls, ideally nursing home, would not be safe on DOAC at home   -keep on tele for now     # worsening dementia   # Agitation   # H/o Multiple fall, deconditioning   # Multiple anaya removal by pt w/ trauma and hematuria   -- trauma w/up negative    -- Fall precautions   -- Gabapentin 100mg tid   -- Psychiatry consultation appreciated-will follow med recs, started abilify and depakote>>improved mental status though at night gets very agitated per nursing staff    -remains on 1:1    # Urinary retention started at home  # Hematuria ( traumatic - patient pulled anaya multiple times )  - c/w Flomax   - US w no hydro or obstruction   - anaya replaced   -c/w mittens   -urology f/u   -can consider TOV when more ambulatory and mental status better     # incidental CT scan findings:    --  Focal area of subcortical white matter hypoattenuation in the left frontoparietal region with questionable associated cortical involvement.    Findings are nonspecific could represent chronic infarct, focal white matter changes including chronic microvascular ischemic changes, focal   gliosis, focal demyelination, If symptoms persist nonemergent outpatient brain MRI can be considered.  -- Few ill-defined though nonaggressive appearing lucencies in the calvarium largest in the left frontal region.   -- Few scattered lucencies throughout the vertebral column in conjunction with the above findings, nonspecific though can consider multiple myeloma  - if within goc as outpt       HTN   -c/w Nifedipine XL 30mg    DVT prophylaxis lovenox     DNR/DNI    Pending: f/u TTE, c/w AC, PT f/u, psych following reamins on 1:1, monitor hematuria and hgb   Housestaff updated daughter 4/24

## 2025-04-25 NOTE — BH CONSULTATION LIAISON PROGRESS NOTE - NSBHASSESSMENTFT_PSY_ALL_CORE
89-year-old male with a PMH of HTN, hearing loss, and dementia (baseline A&Ox1), presenting with altered mental status and recent agitation secondary to urinary retention. Flores catheter was placed on admission. Psychiatry was consulted for agitation.     Patient disoriented, agitated, and confused during visit. Answers were illogical and disorganized, he kept asking to remove the hand mitts. Per medical team, he has been pulling at his catheter and being uncooperative with treatment. Patient's presentation is likely secondary to his neurocognitive decline and IPP is not indicated for management of his symptoms once medically cleared. Depakote will continue to be utilized to manage his impulsivity/agitation as it is interfering with his medical treatment. Patient may be presenting with delirium, please use caution with benzos. Non-pharmacological interventions should be used. Given his prolonged QTc is now normal, we may consider an SGA for agitation. Patient would benefit from ongoing outpatient support for his behavioral disturbances.   89-year-old male with a PMH of HTN, hearing loss, and dementia (baseline A&Ox1), presenting with altered mental status and recent agitation secondary to urinary retention. Flores catheter was placed on admission. Psychiatry was consulted for agitation.     Patient disoriented and confused during visit. Answers were illogical and disorganized. He is less agitated today and engaged provider this morning. His current regimen of Depakote and Melatonin seems to have improved his impulsivity and agitation. Non-pharmacological interventions should be used for his delirium instead of benzos. No need for SGA for agitation at this time. He is not a candidate for IPP at this moment and would benefit from ongoing outpatient support for his behavioral disturbances.      89-year-old male with a PMH of HTN, hearing loss, and dementia (baseline A&Ox1), presenting with altered mental status and recent agitation secondary to urinary retention. Flores catheter was placed on admission. Psychiatry was consulted for agitation.     Patient disoriented and confused during visit. Answers were illogical and disorganized. He is less agitated today and engaged provider this morning. His current regimen of Depakote and Melatonin seems to have improved his impulsivity, agitation, and delirum. Non-pharmacological interventions should be used for his delirium instead of benzos. No need for SGA for agitation at this time. He is not a candidate for IPP at this moment and would benefit from ongoing outpatient support for his behavioral disturbances.

## 2025-04-25 NOTE — PROGRESS NOTE ADULT - SUBJECTIVE AND OBJECTIVE BOX
TOÑO HORNER 89y Male  MRN#: [id number]     Hospital Day: 6d      SUBJECTIVE  No acute events overnight, pt seen and examined this morning.                                          ----------------------------------------------------------  OBJECTIVE  PAST MEDICAL & SURGICAL HISTORY  Hypertension                                              -----------------------------------------------------------  ALLERGIES:  No Known Allergies                                            ------------------------------------------------------------    HOME MEDICATIONS  Home Medications:  brimonidine 0.2% ophthalmic solution: 1 drop(s) to each affected eye 2 times a day (21 Apr 2025 09:54)  enoxaparin: 40 milligram(s) subcutaneous once a day (21 Apr 2025 09:54)  latanoprost 0.005% ophthalmic solution: 1 drop(s) to each affected eye once a day (at bedtime) (21 Apr 2025 09:54)  NIFEdipine 30 mg oral tablet, extended release: 1 tab(s) orally once a day (21 Apr 2025 09:54)  tamsulosin 0.4 mg oral capsule: 1 cap(s) orally once a day (at bedtime) (21 Apr 2025 09:54)  timolol maleate 0.5% ophthalmic solution: 1 drop(s) to each affected eye 2 times a day (21 Apr 2025 09:54)                           MEDICATIONS:  STANDING MEDICATIONS  brimonidine 0.2% Ophthalmic Solution 1 Drop(s) Both EYES two times a day  chlorhexidine 2% Cloths 1 Application(s) Topical daily  divalproex  milliGRAM(s) Oral <User Schedule>  divalproex  milliGRAM(s) Oral <User Schedule>  enoxaparin Injectable 80 milliGRAM(s) SubCutaneous every 12 hours  latanoprost 0.005% Ophthalmic Solution 1 Drop(s) Both EYES at bedtime  melatonin 10 milliGRAM(s) Oral at bedtime  NIFEdipine XL 30 milliGRAM(s) Oral daily  polyethylene glycol 3350 17 Gram(s) Oral two times a day  senna 2 Tablet(s) Oral at bedtime  tamsulosin 0.8 milliGRAM(s) Oral at bedtime  timolol 0.5% Solution 1 Drop(s) Both EYES two times a day    PRN MEDICATIONS  acetaminophen     Tablet .. 650 milliGRAM(s) Oral every 6 hours PRN  aluminum hydroxide/magnesium hydroxide/simethicone Suspension 30 milliLiter(s) Oral every 4 hours PRN  ARIPiprazole 2.5 milliGRAM(s) Oral <User Schedule> PRN                                            ------------------------------------------------------------  VITAL SIGNS: Last 24 Hours  T(C): 36.3 (25 Apr 2025 12:27), Max: 36.8 (24 Apr 2025 20:41)  T(F): 97.3 (25 Apr 2025 12:27), Max: 98.2 (24 Apr 2025 20:41)  HR: 98 (25 Apr 2025 12:27) (96 - 98)  BP: 119/78 (25 Apr 2025 12:27) (119/78 - 149/92)  BP(mean): 111 (25 Apr 2025 04:37) (111 - 111)  RR: 18 (25 Apr 2025 12:27) (17 - 18)  SpO2: 92% (25 Apr 2025 08:28) (92% - 95%)      04-24-25 @ 07:01  -  04-25-25 @ 07:00  --------------------------------------------------------  IN: 240 mL / OUT: 450 mL / NET: -210 mL    04-25-25 @ 07:01  -  04-25-25 @ 14:27  --------------------------------------------------------  IN: 360 mL / OUT: 150 mL / NET: 210 mL                                             --------------------------------------------------------------  LABS:                        14.0   9.83  )-----------( 247      ( 25 Apr 2025 06:22 )             40.5     04-25    142  |  109  |  16  ----------------------------<  95  3.8   |  21  |  1.1    Ca    8.6      25 Apr 2025 06:22  Mg     2.1     04-25    TPro  6.3  /  Alb  3.3[L]  /  TBili  1.1  /  DBili  x   /  AST  18  /  ALT  13  /  AlkPhos  79  04-25      Urinalysis Basic - ( 25 Apr 2025 06:22 )    Color: x / Appearance: x / SG: x / pH: x  Gluc: 95 mg/dL / Ketone: x  / Bili: x / Urobili: x   Blood: x / Protein: x / Nitrite: x   Leuk Esterase: x / RBC: x / WBC x   Sq Epi: x / Non Sq Epi: x / Bacteria: x                                                            -------------------------------------------------------------  RADIOLOGY:  CXR      CT  CT Angio Chest PE Protocol w/ IV Cont:   ACC: [id number] EXAM:  CT ANGIO CHEST PULM ART [facility]   ORDERED BY: SUNIL DELGADO     PROCEDURE DATE:  04/24/2025          INTERPRETATION:  CLINICAL INFORMATION: Chest pain. Elevated d-dimer.    COMPARISON: CT chest 4/19/2025    CONTRAST/COMPLICATIONS:  IV Contrast: Omnipaque 350  65 cc administered   35 cc discarded  Oral Contrast: NONE    PROCEDURE:  CT Angiography of the Chest.  Sagittal and coronal reformats were performed as well as 3D (MIP)   reconstructions.    FINDINGS:    LUNGS AND LARGE AIRWAYS: Patent central airways. Breathing motion   artifact limits evaluation. Grossly unchanged bilateral subpleural   reticulations. Additional areas of atelectasis and scarring bilaterally.  PLEURA: No pleural effusion.  VESSELS: Right interlobar artery embolus with right upper lobe   multisegmental extension. Right lower lobe segmental and subsegmental   emboli. Left lower lobe subsegmental emboli. Aortic and coronary artery   calcifications.  HEART: Stable cardiomegaly No pericardial effusion. No right heart   strain. Aortic valve calcifications.  MEDIASTINUM AND EMMA: Stable heterogeneous left thyroid with substernal   extension.  CHEST WALL AND LOWER NECK: Bilateral gynecomastia.  VISUALIZED UPPER ABDOMEN: Partially visualized large right renal cysts.   Hepatic hypodensities. Cholelithiasis. Small hiatal hernia.  BONES: Degenerative changes. Subacute fractures of right posterior ribs 9   through 12. Additional chronic right-sided rib fractures.    IMPRESSION:  1.  Acute pulmonary emboli in the right interlobar artery, with segmental   extension. Additional more distally located bilateral emboli described   above. No right heart strain.    2.  Subacute fractures of right posterior ribs 9 through 12.    3.  Additional stable findings as above.      Callback request placed at 4:12 AM on 4/24/2025.    --- End of Report ---          DANA BLANCO MD; Resident Radiologist  This document has been electronically signed.  MARGI CULLEN MD; Attending Radiologist  This document has been electronically signed. Apr 24 2025  7:33AM (04-24-25 @ 03:14)                                            --------------------------------------------------------------    PHYSICAL EXAM:  GENERAL: NAD, lying in bed comfortably  CHEST/LUNG: Clear to auscultation bilaterally; No rales, rhonchi, wheezing, or rubs. Unlabored respirations  HEART: Regular rate and rhythm; No murmurs, rubs, or gallops  ABDOMEN: Soft, nontender, nondistended  EXTREMITIES:  No clubbing, cyanosis, or edema  NERVOUS SYSTEM:  A&Ox1, confused

## 2025-04-26 NOTE — PROGRESS NOTE ADULT - SUBJECTIVE AND OBJECTIVE BOX
24H events:    Patient is a 89y old Male who presents with a chief complaint of hematuria (25 Apr 2025 14:27)    Primary diagnosis of Confusion    Today is hospital day 7d. This morning patient was seen and examined at bedside, resting comfortably in bed.    Pt pulled out anaya x2 overnight. 8 second run of NSVT this AM.     Code Status: DNR/DNI    ALLERGIES:  No Known Allergies    MEDICATIONS:  STANDING MEDICATIONS  brimonidine 0.2% Ophthalmic Solution 1 Drop(s) Both EYES two times a day  chlorhexidine 2% Cloths 1 Application(s) Topical daily  divalproex  milliGRAM(s) Oral <User Schedule>  divalproex  milliGRAM(s) Oral <User Schedule>  enoxaparin Injectable 80 milliGRAM(s) SubCutaneous every 12 hours  latanoprost 0.005% Ophthalmic Solution 1 Drop(s) Both EYES at bedtime  melatonin 10 milliGRAM(s) Oral at bedtime  NIFEdipine XL 30 milliGRAM(s) Oral daily  polyethylene glycol 3350 17 Gram(s) Oral two times a day  senna 2 Tablet(s) Oral at bedtime  tamsulosin 0.8 milliGRAM(s) Oral at bedtime  timolol 0.5% Solution 1 Drop(s) Both EYES two times a day    PRN MEDICATIONS  acetaminophen     Tablet .. 650 milliGRAM(s) Oral every 6 hours PRN  aluminum hydroxide/magnesium hydroxide/simethicone Suspension 30 milliLiter(s) Oral every 4 hours PRN  ARIPiprazole 2.5 milliGRAM(s) Oral <User Schedule> PRN    VITALS:   T(F): 97.3  HR: 84  BP: 107/60  RR: 17  SpO2: 97%    PHYSICAL EXAM:  GENERAL: NAD, lying in bed comfortably  HEART: Regular rate and rhythm, normal S1/S2  LUNGS: No acute respiratory distress, clear b/l breath sounds  ABDOMEN:  soft, nontender, nondistended, + BS  EXTREMITIES: no LE edema   NERVOUS SYSTEM:  A&O x0  SKIN: No rashes or lesions       (  ) Indwelling Anaya Catheter:   Date insterted:    Reason (  ) Critical illness     ( x ) urinary retention    (  ) Accurate Ins/Outs Monitoring     (  ) CMO patient      LABS:                        13.4   7.33  )-----------( 242      ( 26 Apr 2025 05:20 )             38.6     04-26    139  |  106  |  15  ----------------------------<  84  3.6   |  21  |  1.1    Ca    8.4      26 Apr 2025 05:20  Mg     2.0     04-26    TPro  6.2  /  Alb  3.2[L]  /  TBili  1.0  /  DBili  x   /  AST  23  /  ALT  15  /  AlkPhos  80  04-26      Urinalysis Basic - ( 26 Apr 2025 05:20 )    Color: x / Appearance: x / SG: x / pH: x  Gluc: 84 mg/dL / Ketone: x  / Bili: x / Urobili: x   Blood: x / Protein: x / Nitrite: x   Leuk Esterase: x / RBC: x / WBC x   Sq Epi: x / Non Sq Epi: x / Bacteria: x    RADIOLOGY:  Xray Kidney Ureter Bladder (04.24.25 @ 06:12)   INTERPRETATION:  CLINICAL HISTORY / REASON FOR EXAM: Constipation    COMPARISON: Abdominal radiograph from April 23, 2025    TECHNIQUE: Abdominal radiograph, three images    FINDINGS:    TUBES/LINES: Anaya catheter balloon overlie the lower pelvis.    PERITONEUM/MESENTERY/BOWEL: Gaseous distention of small bowel. Limited   evaluation for pneumoperitoneum on supine radiographs. Contrast overlies   the urinary bladder.    BONES/SOFT TISSUES: Degenerative changes.      IMPRESSION:    Increased gaseous distention of small bowel.    CT Angio Chest PE Protocol w/ IV Cont (04.24.25 @ 03:14)   INTERPRETATION:  CLINICAL INFORMATION: Chest pain. Elevated d-dimer.    COMPARISON: CT chest 4/19/2025    CONTRAST/COMPLICATIONS:  IV Contrast: Omnipaque 350  65 cc administered   35 cc discarded  Oral Contrast: NONE    PROCEDURE:  CT Angiography of the Chest.  Sagittal and coronal reformats were performed as well as 3D (MIP)   reconstructions.    FINDINGS:    LUNGS AND LARGE AIRWAYS: Patent central airways. Breathing motion   artifact limits evaluation. Grossly unchanged bilateral subpleural   reticulations. Additional areas of atelectasis and scarring bilaterally.  PLEURA: No pleural effusion.  VESSELS: Right interlobar artery embolus with right upper lobe   multisegmental extension. Right lower lobe segmental and subsegmental   emboli. Left lower lobe subsegmental emboli. Aortic and coronary artery   calcifications.  HEART: Stable cardiomegaly No pericardial effusion. No right heart   strain. Aortic valve calcifications.  MEDIASTINUM AND EMMA: Stable heterogeneous left thyroid with substernal   extension.  CHEST WALL AND LOWER NECK: Bilateral gynecomastia.  VISUALIZED UPPER ABDOMEN: Partially visualized large right renal cysts.   Hepatic hypodensities. Cholelithiasis. Small hiatal hernia.  BONES: Degenerative changes. Subacute fractures of right posterior ribs 9   through 12. Additional chronic right-sided rib fractures.    IMPRESSION:  1.  Acute pulmonary emboli in the right interlobar artery, with segmental   extension. Additional more distally located bilateral emboli described   above. No right heart strain.    2.  Subacute fractures of right posterior ribs 9 through 12.    3.  Additional stable findings as above.      TTE Echo Complete w/o Contrast w/ Doppler (04.25.25 @ 12:34)   Summary:   1. Technically difficult study.   2. Left ventricular ejection fraction, by visual estimation, is 30 to   35%.   3. Moderately decreased global left ventricular systolic function.   4. Increased LV wall thickness.   5. The left ventricular diastolic function could not be assessed in this   study.   6. Mildly reduced RV systolic function.   7. Normal left atrial size.   8. Normal right atrial size.   9. Mild thickening and calcification of the anterior and posterior   mitral valve leaflets.  10. Trace mitral valve regurgitation.  11. Mild tricuspid regurgitation.  12. Sclerotic aortic valve with decreased opening.  13. Dilatation of the ascending aorta.    PHYSICIAN INTERPRETATION:  Left Ventricle: The left ventricular internal cavity size is normal. Left   ventricular wall thickness is increased. Global LV systolic function was   moderately decreased. Left ventricular ejection fraction, by visual   estimation, is 30to 35%. The left ventricular diastolic function could   not be assessed in this study.  Right Ventricle: The right ventricular size is normal. RV systolic   function is mildly reduced.  Left Atrium: Normal left atrial size.  Right Atrium: Normal right atrial size.  Pericardium: There is no evidence of pericardial effusion.  Mitral Valve: Mild thickening and calcification of the anterior and   posterior mitral valve leaflets. Trace mitral valve regurgitation is seen.  Tricuspid Valve: The tricuspid valve is normal in structure. Mild   tricuspid regurgitation is visualized.  Aortic Valve: Sclerotic aortic valve with decreased opening. No evidence   of aortic valve regurgitation is seen.  Pulmonic Valve: The pulmonic valve was not well visualized.  Aorta: There is dilatation of the ascending aorta.  Pulmonary Artery: The pulmonary artery is of normal size and origin.

## 2025-04-26 NOTE — PROGRESS NOTE ADULT - ASSESSMENT
89 yo M pmhx htn presenting to the ED for evaluation. pt son at bedside endorsing he has been more confused and speaking "gibberish". pt fell 2 weeks ago, diagnosed with 4 Right sided rib fractures outpatient. Pt also with urinary retention. Pt had anaya placed in the ER on admission and he removed it and it was replaced again and then he removed again. Now with some light hematuria.   Psychiatry consulted to assist with medication management for Dementia with Agitation vs Delirium as QTc > 500 and can not use usual antipsychotics. On 4/23 pt endorsed some chest pain so transferred to  tele, found to have acute PE.     # Acute low risk PE  -endorsed some chest discomfort when walking with PT 4/23, dimer was elevated  - CTA noted. PE, no right heart strain  - Trop 50 >> 56  - on RA  - DUplex negative  - f/u TTE  -c/w lovenox therapeutic, will need to transition to DOAC on dc though will need good dispo as pt has hx of recurrent falls, ideally nursing home, would not be safe on DOAC at home   -keep on tele for now       # worsening dementia??  # Agitation   # H/o Multiple fall, deconditioning   # Multiple anaya removal by pt w/ trauma and hematuria   - Pet grandaughter/sister - he lives upstairs and is independant with most ADLs (bathing, walking, eating..). This decline represents a significant change from baseline  -- trauma w/up negative    -- Fall precautions   -- Gabapentin 100mg tid   -- Psychiatry consultation appreciated-will follow med recs, started abilify and depakote   -remains on 1:1    # Urinary retention started at home  # Hematuria ( traumatic - patient pulled anaya)  - Possible causes of obstruction include BPH or constipation  - c/w Flomax   - US w no hydro or obstruction   - anaya replaced   -c/w mittens   - pt continues to remove anaya and with hematuria, now on restraints  - bladder irrigation q3     #Constipation  - C/w bowel regimen     # incidental CT scan findings:  - Focal area of subcortical white matter hypoattenuation in the left frontoparietal region with questionable associated cortical involvement.    Findings are nonspecific could represent chronic infarct, focal white matter changes including chronic microvascular ischemic changes, focal   gliosis, focal demyelination, If symptoms persist nonemergent outpatient brain MRI can be considered.  -Few ill-defined though nonaggressive appearing lucencies in the calvarium largest in the left frontal region.   - Few scattered lucencies throughout the vertebral column in conjunction with the above findings, nonspecific though can consider multiple myeloma  - if within goc as outpt       HTN   -c/w Nifedipine XL 30mg    DVT prophylaxis lovenox     DNR/DNI    Pending: started AC, PT f/u, psych following reamins on 1:1, monitor hematuria      89 yo M pmhx htn presenting to the ED for evaluation. pt son at bedside endorsing he has been more confused and speaking "gibberish". pt fell 2 weeks ago, diagnosed with 4 Right sided rib fractures outpatient. Pt also with urinary retention. Pt had anaya placed in the ER on admission and he removed it and it was replaced again and then he removed again. Now with some light hematuria.   Psychiatry consulted to assist with medication management for Dementia with Agitation vs Delirium as QTc > 500 and can not use usual antipsychotics. On 4/23 pt endorsed some chest pain so transferred to  tele, found to have acute PE.     # Acute low risk PE  -endorsed some chest discomfort when walking with PT 4/23, dimer was elevated  - CTA noted. PE, no right heart strain  - Trop 50 >> 56  - on RA  - DUplex negative  - f/u TTE  -c/w lovenox therapeutic, will need to transition to DOAC on dc though will need good dispo as pt has hx of recurrent falls, ideally nursing home, would not be safe on DOAC at home   -keep on tele for now       # worsening dementia??  # Agitation   # H/o Multiple fall, deconditioning   # Multiple anaya removal by pt w/ trauma and hematuria   - Pet grandaughter/sister - he lives upstairs and is independant with most ADLs (bathing, walking, eating..). This decline represents a significant change from baseline  -- trauma w/up negative    -- Fall precautions   -- Gabapentin 100mg tid   -- Psychiatry consultation appreciated-will follow med recs, started abilify and depakote   -remains on 1:1    # Urinary retention started at home  # Gross Hematuria ( traumatic - patient pulled anaya)  - Possible causes of obstruction include BPH or constipation  - c/w Flomax   - US w no hydro or obstruction   - anaya replaced   -c/w mittens   - pt continues to remove anaya and with hematuria, now on mittens   - bladder irrigation q3     #Constipation  - C/w bowel regimen     # incidental CT scan findings:  - Focal area of subcortical white matter hypoattenuation in the left frontoparietal region with questionable associated cortical involvement.    Findings are nonspecific could represent chronic infarct, focal white matter changes including chronic microvascular ischemic changes, focal   gliosis, focal demyelination, If symptoms persist nonemergent outpatient brain MRI can be considered.  -Few ill-defined though nonaggressive appearing lucencies in the calvarium largest in the left frontal region.   - Few scattered lucencies throughout the vertebral column in conjunction with the above findings, nonspecific though can consider multiple myeloma  - if within goc as outpt       HTN   -c/w Nifedipine XL 30mg    DVT prophylaxis lovenox     DNR/DNI    Pending: started AC, PT f/u, psych following reamins on 1:1, monitor hematuria

## 2025-04-27 NOTE — PROGRESS NOTE ADULT - SUBJECTIVE AND OBJECTIVE BOX
Pt seen and examined at bedside.        VITAL SIGNS (Last 24 hrs):  T(C): 37 (04-27-25 @ 05:44), Max: 37 (04-27-25 @ 05:44)  HR: 84 (04-27-25 @ 05:44) (84 - 84)  BP: 122/74 (04-27-25 @ 05:44) (104/66 - 122/74)  RR: 18 (04-27-25 @ 05:44) (18 - 18)  SpO2: 98% (04-27-25 @ 05:44) (97% - 98%)          I&O's Summary    26 Apr 2025 07:01  -  27 Apr 2025 07:00  --------------------------------------------------------  IN: 656 mL / OUT: 650 mL / NET: 6 mL    27 Apr 2025 07:01  -  27 Apr 2025 13:05  --------------------------------------------------------  IN: 117 mL / OUT: 0 mL / NET: 117 mL        PHYSICAL EXAM:  GENERAL: NAD   HEAD:  Atraumatic, Normocephalic  EYES:  conjunctiva and sclera clear  NECK: Supple, No JVD  CHEST/LUNG: Clear to auscultation bilaterally; No wheeze  HEART: Regular rate and rhythm; No murmurs, rubs, or gallops  ABDOMEN: Soft, Nontender, Nondistended; Bowel sounds present  EXTREMITIES:  2+ Peripheral Pulses, No clubbing, cyanosis, or edema  SKIN: No rashes or lesions        Labs Reviewed       CBC Full  -  ( 27 Apr 2025 04:40 )  WBC Count : 5.77 K/uL  Hemoglobin : 13.1 g/dL  Hematocrit : 38.0 %  Platelet Count - Automated : 232 K/uL  Mean Cell Volume : 92.0 fL  Mean Cell Hemoglobin : 31.7 pg  Mean Cell Hemoglobin Concentration : 34.5 g/dL  Auto Neutrophil # : x  Auto Lymphocyte # : x  Auto Monocyte # : x  Auto Eosinophil # : x  Auto Basophil # : x  Auto Neutrophil % : x  Auto Lymphocyte % : x  Auto Monocyte % : x  Auto Eosinophil % : x  Auto Basophil % : x    BMP:    04-27 @ 04:40    Blood Urea Nitrogen - 12  Calcium - 8.5  Carbond Dioxide - 19  Chloride - 107  Creatinine - 0.9  Glucose - 81  Potassium - 3.4  Sodium - 140           MEDICATIONS  (STANDING):  brimonidine 0.2% Ophthalmic Solution 1 Drop(s) Both EYES two times a day  chlorhexidine 2% Cloths 1 Application(s) Topical daily  divalproex  milliGRAM(s) Oral <User Schedule>  divalproex  milliGRAM(s) Oral <User Schedule>  enoxaparin Injectable 80 milliGRAM(s) SubCutaneous every 12 hours  latanoprost 0.005% Ophthalmic Solution 1 Drop(s) Both EYES at bedtime  melatonin 10 milliGRAM(s) Oral at bedtime  polyethylene glycol 3350 17 Gram(s) Oral two times a day  potassium chloride   Powder 40 milliEquivalent(s) Oral once  senna 2 Tablet(s) Oral at bedtime  tamsulosin 0.8 milliGRAM(s) Oral at bedtime  timolol 0.5% Solution 1 Drop(s) Both EYES two times a day    MEDICATIONS  (PRN):  acetaminophen     Tablet .. 650 milliGRAM(s) Oral every 6 hours PRN Temp greater or equal to 38C (100.4F), Mild Pain (1 - 3)  aluminum hydroxide/magnesium hydroxide/simethicone Suspension 30 milliLiter(s) Oral every 4 hours PRN Dyspepsia  ARIPiprazole 2.5 milliGRAM(s) Oral <User Schedule> PRN agitation

## 2025-04-27 NOTE — CHART NOTE - NSCHARTNOTEFT_GEN_A_CORE
Called by RN for assistance with catheter irrigation    [x] Due to altered mental status/intubation, subjective information were not able to be obtained from patient. History was obtained, to the extent possible, from review of the chart and collateral sources of information.    History of Present Illness:   Pt is a 89 yo M pmhx htn presenting to the ED for evaluation of AMS. Hx obtained per covering RN and chart review as pt unable to provide hx 2/2 AMS.   On 4/19/25, pt was found to be retaining urine in the ED (631cc) with pt unable to void so anaya catheter was placed by ED staff. Patient was admitted to Medicine for further evaluation of AMS, lethargy and rib fx that were likely sustained from a fall a couple of weeks prior per chart. During this time, pt had remained confused and pulled on his catheter causing him to become hematuric which quickly resolved.   On 4/21 patient was given a TOV and passed when US showed a PVR of 23cc.   On 4/23 Per chart, patient had again developed urinary retention (bedside bladder scan showed a high PVR per chart though exact amount unknown at this time) so decision was made by primary team to place anaya catheter. Patient again developed confusion and pulled on his catheter again causing him to become hematuric so patient had been placed on restraints.  On 4/24 patient was found to have an acute right sided PE and had been started on AC. Remains on a 1:1 sit.     Today 4/27 RN called  team for assistance with catheter irrigation. Anaya catheter at this time draining clear dark yellow urine without any blood or blood clots noted to be draining within the anaya catheter tubing. Irrigated anaya catheter with 150cc of SW without issues and left anaya catheter draining clear yellow urine without any clots noted in tubing.    Plan:  - No further acute  intervention indicated at this time  - Cont anaya, actively draining clear yellow urine at this time without evidence of hematuria  - Cont flomax   - D/c anaya when no longer medically necessary/ TOV when ambulating 150 ft or greater/ mental status and ambulatory status is closer to baseline.   - When giving TOV, please obtain repeat RBUS w/ PVR to confirm adequate bladder emptying. If patient fails TOV replace anaya and have patient f/u OP w/  for UDS and further urologic management  - Cont care per primary team  - Recall prn Called by RN for assistance with catheter irrigation    [x] Due to altered mental status/intubation, subjective information were not able to be obtained from patient. History was obtained, to the extent possible, from review of the chart and collateral sources of information.    History of Present Illness:   Pt is a 89 yo M pmhx htn presenting to the ED for evaluation of AMS. Hx obtained per covering RN and chart review as pt unable to provide hx 2/2 AMS.   On 4/19/25, pt was found to be retaining urine in the ED (631cc) with pt unable to void so anaya catheter was placed by ED staff. Patient was admitted to Medicine for further evaluation of AMS, lethargy and rib fx that were likely sustained from a fall a couple of weeks prior per chart. During this time, pt had remained confused and pulled on his catheter causing him to become hematuric which quickly resolved.   On 4/21 patient was given a TOV and passed when US showed a PVR of 23cc.   On 4/23 Per chart, patient had again developed urinary retention (bedside bladder scan showed a high PVR per chart though exact amount unknown at this time) so decision was made by primary team to place anaya catheter. Patient again developed confusion and pulled on his catheter again causing him to become hematuric so patient had been placed on restraints.  On 4/24 patient was found to have an acute right sided PE and had been started on AC. Remains on a 1:1 sit.     Today 4/27 RN called  team for assistance with catheter irrigation. Anaya catheter at this time draining clear dark yellow urine without any blood or blood clots noted to be draining within the anaya catheter tubing. Irrigated anaya catheter with 150cc of SW without issues and left anaya catheter draining clear yellow urine without any clots noted in tubing.    Plan:  - No further acute  intervention indicated at this time  - Cont anaya, actively draining clear yellow urine at this time without evidence of hematuria  - Cont flomax   - D/w covering medical attending, will proceed with catheter, restraints and 1:1 sit for further monitoring for now  - D/c anaya when no longer medically necessary/ TOV when ambulating 150 ft or greater/ mental status and ambulatory status is closer to baseline.   - When giving TOV, please obtain repeat RBUS w/ PVR to confirm adequate bladder emptying. If patient fails TOV replace anaya and have patient f/u OP w/  for UDS and further urologic management  - Cont care per primary team  - Recall prn

## 2025-04-27 NOTE — PROGRESS NOTE ADULT - ASSESSMENT
89 yo M pmhx htn presenting to the ED for evaluation. pt son at bedside endorsing he has been more confused and speaking "gibberish". pt fell 2 weeks ago, diagnosed with 4 Right sided rib fractures outpatient. Pt also with urinary retention. Pt had anaya placed in the ER on admission and he removed it and it was replaced again and then he removed again. Now with some light hematuria.   Psychiatry consulted to assist with medication management for Dementia with Agitation vs Delirium as QTc > 500 and can not use usual antipsychotics. On 4/23 pt endorsed some chest pain so transferred to  tele, found to have acute PE.       # New onset HFrEF   - TTE: Global LV systolic function was moderately decreased. Left ventricular ejection fraction, by visual estimation, is 30 to 35%.   - start GDMT   - discontinued nifedipine and start ACEI and BB   - not a candidate for LifeVest or AICD due to age, dementia and poor functional status     # NSVT   - started BB  - keep K >4 and Mg > 2   - keep on tele     # Acute low risk PE  - CTA noted. PE, no right heart strain  - on RA  - Duplex negative  - c/w Lovenox therapeutic, will need to transition to DOAC on dc though will need good dispo as pt has hx of recurrent falls, ideally nursing home, would not be safe on DOAC at home     # Urinary retention started at home  # Gross Hematuria ( traumatic - patient pulled anaya) - resolved   - Possible causes of obstruction include BPH or constipation  - c/w Flomax   - US w no hydro or obstruction   - anaya replaced   - c/w mittens     # Worsening dementia   # Agitation   # H/o Multiple fall, deconditioning   # Multiple Anaya removal by pt w/ trauma and hematuria   - Per family - he lives upstairs and was independant with most ADLs (bathing, walking, eating). This decline represents a significant change from baseline  -- trauma w/up negative    -- Fall precautions   -- Psychiatry consultation appreciated-will follow med recs, started abilify and depakote   -- remains on 1:1    # Constipation  - C/w bowel regimen     # incidental CT scan findings:  - Focal area of subcortical white matter hypoattenuation in the left frontoparietal region with questionable associated cortical involvement.    Findings are nonspecific could represent chronic infarct, focal white matter changes including chronic microvascular ischemic changes, focal   gliosis, focal demyelination, If symptoms persist nonemergent outpatient brain MRI can be considered.  -Few ill-defined though nonaggressive appearing lucencies in the calvarium largest in the left frontal region.   - Few scattered lucencies throughout the vertebral column in conjunction with the above findings, nonspecific though can consider multiple myeloma  - if within goc as outpt     DVT prophylaxis Lovenox     DNR/DNI    Pending: SNF placement, needs to be off 1:1

## 2025-04-28 NOTE — BH CONSULTATION LIAISON PROGRESS NOTE - NSBHFUPINTERVALHXFT_PSY_A_CORE
Patient seen resting at bedside, 1:1 sitter present. He is not oriented to time, place or situation. Per sitter, patient did not sleep last night. He is somewhat disorganized, requesting to use the bathroom while he has a anaya in place. Denies AH/VH. Denies suicidal thoughts, plans or intent at the time of the interview.

## 2025-04-28 NOTE — BH CONSULTATION LIAISON PROGRESS NOTE - NSBHCONSULTRECOMMENDOTHER_PSY_A_CORE FT
- initiate quetiapine 25 mg qHS for sleep  - Continue depakote to 250mg QAM and 500mg QPM for behavioral disturbances  - patient at significant risk for delirium; please implement non-pharmacological interventions    Recommendations to nursing for non-pharmacological interventions for delirium management:  1.	Reorient Frequently   2.	Encourage Early Mobility   3.	Encourage talking to patient prior to hands on care    4.	Prevent overstimulation     5.	Prevent Day night Reversal : Out of bed at least 3 times during the day. Can be sitting in chair or walking in unit with assistance or even sitting on bed   6.	Curtains up from 8 am to 8 pm    7.	At least 6 hours of uninterrupted sleep at night    8.	Avoid Benzodiazepines, Anticholinergics and antihistaminergics   9.	Avoid Restraints : if needed utilize the least restrictive form : 1:1  <hand mitts< 2 point soft < 4 point soft < 2 point hard <  4 point hard    10.	Treat underlying cause    11.	Maintain K>4 and Mg >2 at all times  - Initiate quetiapine 25 mg qHS for sleep  - Continue depakote to 250mg QAM and 500mg QPM for behavioral disturbances  - Patient at significant risk for delirium; please implement non-pharmacological interventions when possible    Recommendations to nursing for non-pharmacological interventions for delirium management:  1.	Reorient Frequently   2.	Encourage Early Mobility   3.	Encourage talking to patient prior to hands on care    4.	Prevent overstimulation     5.	Prevent Day night Reversal : Out of bed at least 3 times during the day. Can be sitting in chair or walking in unit with assistance or even sitting on bed   6.	Curtains up from 8 am to 8 pm    7.	At least 6 hours of uninterrupted sleep at night    8.	Avoid Benzodiazepines, Anticholinergics and antihistaminergics   9.	Avoid Restraints : if needed utilize the least restrictive form : 1:1  <hand mitts< 2 point soft < 4 point soft < 2 point hard <  4 point hard    10.	Treat underlying cause    11.	Maintain K>4 and Mg >2 at all times

## 2025-04-28 NOTE — PROGRESS NOTE ADULT - SUBJECTIVE AND OBJECTIVE BOX
TOÑO HORNER  89y  Male    Reason for Admission:   OVERNIGHT/INTERIM: Pt seen and examined at bedside during AM rounds.                Vital Signs Last 24 Hrs  T(C): 36.3 (28 Apr 2025 05:00), Max: 36.3 (27 Apr 2025 13:31)  T(F): 97.4 (28 Apr 2025 05:00), Max: 97.4 (28 Apr 2025 05:00)  HR: 77 (28 Apr 2025 05:00) (77 - 97)  BP: 118/74 (28 Apr 2025 05:00) (107/70 - 131/87)  BP(mean): 89 (28 Apr 2025 05:00) (89 - 89)  RR: 18 (28 Apr 2025 05:00) (18 - 18)  SpO2: 96% (28 Apr 2025 05:02) (86% - 96%)    Parameters below as of 28 Apr 2025 05:02  Patient On (Oxygen Delivery Method): nasal cannula  O2 Flow (L/min): 2      PHYSICAL EXAM:  GENERAL: NAD, well-groomed, well-developed  HEENT - NC/AT, pupils equal and reactive to light,   NECK: Supple  CHEST/LUNG: Clear to auscultation bilaterally; No rales, rhonchi, wheezing  HEART: Regular rate and rhythm; No murmurs, rubs, or gallops  ABDOMEN: Soft, Nontender, Nondistended; Bowel sounds present  EXTREMITIES:   No clubbing, cyanosis, or edema  SKIN: No rashes or lesions    LABS:  cret                        13.1   5.77  )-----------( 232      ( 27 Apr 2025 04:40 )             38.0     04-27    140  |  107  |  12  ----------------------------<  81  3.4[L]   |  19  |  0.9    Ca    8.5      27 Apr 2025 04:40  Mg     2.1     04-27    TPro  6.3  /  Alb  3.3[L]  /  TBili  1.0  /  DBili  x   /  AST  41  /  ALT  24  /  AlkPhos  77  04-27                MedsMEDICATIONS  (STANDING):  brimonidine 0.2% Ophthalmic Solution 1 Drop(s) Both EYES two times a day  chlorhexidine 2% Cloths 1 Application(s) Topical daily  divalproex  milliGRAM(s) Oral <User Schedule>  divalproex  milliGRAM(s) Oral <User Schedule>  enoxaparin Injectable 80 milliGRAM(s) SubCutaneous every 12 hours  latanoprost 0.005% Ophthalmic Solution 1 Drop(s) Both EYES at bedtime  lisinopril 10 milliGRAM(s) Oral daily  melatonin 10 milliGRAM(s) Oral at bedtime  metoprolol succinate ER 25 milliGRAM(s) Oral daily  polyethylene glycol 3350 17 Gram(s) Oral two times a day  senna 2 Tablet(s) Oral at bedtime  tamsulosin 0.8 milliGRAM(s) Oral at bedtime  timolol 0.5% Solution 1 Drop(s) Both EYES two times a day    MEDICATIONS  (PRN):  acetaminophen     Tablet .. 650 milliGRAM(s) Oral every 6 hours PRN Temp greater or equal to 38C (100.4F), Mild Pain (1 - 3)  aluminum hydroxide/magnesium hydroxide/simethicone Suspension 30 milliLiter(s) Oral every 4 hours PRN Dyspepsia  ARIPiprazole 2.5 milliGRAM(s) Oral <User Schedule> PRN agitation             MEHRANTOÑO MARK  89y  Male    Reason for Admission:   OVERNIGHT/INTERIM: Pt seen and examined at bedside during AM rounds.      Vital Signs Last 24 Hrs  T(C): 36.3 (28 Apr 2025 05:00), Max: 36.3 (27 Apr 2025 13:31)  T(F): 97.4 (28 Apr 2025 05:00), Max: 97.4 (28 Apr 2025 05:00)  HR: 77 (28 Apr 2025 05:00) (77 - 97)  BP: 118/74 (28 Apr 2025 05:00) (107/70 - 131/87)  BP(mean): 89 (28 Apr 2025 05:00) (89 - 89)  RR: 18 (28 Apr 2025 05:00) (18 - 18)  SpO2: 96% (28 Apr 2025 05:02) (86% - 96%)    Parameters below as of 28 Apr 2025 05:02  Patient On (Oxygen Delivery Method): nasal cannula  O2 Flow (L/min): 2      PHYSICAL EXAM:  GENERAL: NAD, well-groomed, well-developed  HEENT - NC/AT, pupils equal and reactive to light,   NECK: Supple  CHEST/LUNG: Clear to auscultation bilaterally; No rales, rhonchi, wheezing  HEART: Regular rate and rhythm; No murmurs, rubs, or gallops  ABDOMEN: Soft, Nontender, Nondistended; Bowel sounds present  EXTREMITIES:   No clubbing, cyanosis, or edema  SKIN: No rashes or lesions    LABS:             13.8   6.10  )-----------( 242      ( 28 Apr 2025 06:14 )             40.6     WBC trend: 6.10 <--, 5.77 <--, 7.33 <--, 9.83 <--  Hemoglobin: 13.8 [04-28-25 @ 06:14]<--, 13.1 [04-27-25 @ 04:40]<--, 13.4 [04-26-25 @ 05:20]<--, 14.0 [04-25-25 @ 06:22]<--, 14.7 [04-24-25 @ 05:41]<--  04-28    140  |  107  |  10  ----------------------------<  82  3.6   |  19  |  0.9    Ca    8.9      28 Apr 2025 06:14  Mg     2.1     04-28    TPro  6.8  /  Alb  3.6  /  TBili  0.9  /  DBili  x   /  AST  79[H]  /  ALT  51[H]  /  AlkPhos  93  04-28      SODIUM TREND: Sodium 140 [04-28 @ 06:14]<--, Sodium 140 [04-27 @ 04:40]<--, Sodium 139 [04-26 @ 05:20]<--, Sodium 142 [04-25 @ 06:22]<--, Sodium 141 [04-24 @ 05:41]<--, Sodium 140 [04-23 @ 17:39]<--  Serum creatinine: 0.9 [04-28-25 @ 06:14]<--, 0.9 [04-27-25 @ 04:40]<--, 1.1 [04-26-25 @ 05:20]<--, 1.1 [04-25-25 @ 06:22]<--, 1.0 [04-24-25 @ 05:41]<--  POC Glucose:         Troponin T, High Sensitivity Result: 56 ng/L (04-24-25 @ 05:41)  Troponin T, High Sensitivity Result: 50 ng/L (04-24-25 @ 01:00)      D-Dimer Assay, Quantitative: 1012 ng/mL DDU (04-23-25 @ 17:39)      Urinalysis Basic - ( 28 Apr 2025 06:14 )    Color: x / Appearance: x / SG: x / pH: x  Gluc: 82 mg/dL / Ketone: x  / Bili: x / Urobili: x   Blood: x / Protein: x / Nitrite: x   Leuk Esterase: x / RBC: x / WBC x   Sq Epi: x / Non Sq Epi: x / Bacteria: x        MedsMEDICATIONS  (STANDING):  brimonidine 0.2% Ophthalmic Solution 1 Drop(s) Both EYES two times a day  chlorhexidine 2% Cloths 1 Application(s) Topical daily  divalproex  milliGRAM(s) Oral <User Schedule>  divalproex  milliGRAM(s) Oral <User Schedule>  enoxaparin Injectable 80 milliGRAM(s) SubCutaneous every 12 hours  latanoprost 0.005% Ophthalmic Solution 1 Drop(s) Both EYES at bedtime  lisinopril 10 milliGRAM(s) Oral daily  melatonin 10 milliGRAM(s) Oral at bedtime  metoprolol succinate ER 25 milliGRAM(s) Oral daily  polyethylene glycol 3350 17 Gram(s) Oral two times a day  senna 2 Tablet(s) Oral at bedtime  tamsulosin 0.8 milliGRAM(s) Oral at bedtime  timolol 0.5% Solution 1 Drop(s) Both EYES two times a day    MEDICATIONS  (PRN):  acetaminophen     Tablet .. 650 milliGRAM(s) Oral every 6 hours PRN Temp greater or equal to 38C (100.4F), Mild Pain (1 - 3)  aluminum hydroxide/magnesium hydroxide/simethicone Suspension 30 milliLiter(s) Oral every 4 hours PRN Dyspepsia  ARIPiprazole 2.5 milliGRAM(s) Oral <User Schedule> PRN agitation

## 2025-04-28 NOTE — PROGRESS NOTE ADULT - ASSESSMENT
89 yo M pmhx htn presenting to the ED for evaluation. pt son at bedside endorsing he has been more confused and speaking "gibberish". pt fell 2 weeks ago, diagnosed with 4 Right sided rib fractures outpatient. Pt also with urinary retention. Pt had anaya placed in the ER on admission and he removed it and it was replaced again and then he removed again. Now with some light hematuria.   Psychiatry consulted to assist with medication management for Dementia with Agitation vs Delirium as QTc > 500 and can not use usual antipsychotics. On 4/23 pt endorsed some chest pain so transferred to  tele, found to have acute PE.       # New onset HFrEF   - TTE: Global LV systolic function was moderately decreased. Left ventricular ejection fraction, by visual estimation, is 30 to 35%.   - start GDMT --> introduced losartan and metoprolol 4/27  - discontinued nifedipine and start ACEI and BB   - not a candidate for LifeVest or AICD due to age, dementia and poor functional status     # NSVT   - started BB  - keep K >4 and Mg > 2   - keep on tele     # Acute low risk PE  - CTA noted. PE, no right heart strain  - on RA  - Duplex negative  - c/w Lovenox therapeutic, will need to transition to DOAC on dc though will need good dispo as pt has hx of recurrent falls, ideally nursing home, would not be safe on DOAC at home     # Urinary retention started at home  # Gross Hematuria ( traumatic - patient pulled anaya) - resolved   - Possible causes of obstruction include BPH or constipation  - c/w Flomax   - US w no hydro or obstruction   - anaya replaced   - c/w mittens     # Worsening dementia   # Agitation   # H/o Multiple fall, deconditioning   # Multiple Anaya removal by pt w/ trauma and hematuria   - Per family - he lives upstairs and was independant with most ADLs (bathing, walking, eating). This decline represents a significant change from baseline  -- trauma w/up negative    -- Fall precautions   -- Psychiatry consultation appreciated-will follow med recs, started abilify and depakote   -- remains on 1:1    # Constipation  - C/w bowel regimen     # incidental CT scan findings:  - Focal area of subcortical white matter hypoattenuation in the left frontoparietal region with questionable associated cortical involvement.    Findings are nonspecific could represent chronic infarct, focal white matter changes including chronic microvascular ischemic changes, focal   gliosis, focal demyelination, If symptoms persist nonemergent outpatient brain MRI can be considered.  -Few ill-defined though nonaggressive appearing lucencies in the calvarium largest in the left frontal region.   - Few scattered lucencies throughout the vertebral column in conjunction with the above findings, nonspecific though can consider multiple myeloma  - if within goc as outpt     DVT prophylaxis Lovenox     DNR/DNI    Pending: SNF placement, needs to be off 1:1     89 yo M pmhx htn presenting to the ED for evaluation. pt son at bedside endorsing he has been more confused and speaking "gibberish". pt fell 2 weeks ago, diagnosed with 4 Right sided rib fractures outpatient. Pt also with urinary retention. Pt had anaya placed in the ER on admission and he removed it and it was replaced again and then he removed again. Now with some light hematuria.   Psychiatry consulted to assist with medication management for Dementia with Agitation vs Delirium as QTc > 500 and can not use usual antipsychotics. On 4/23 pt endorsed some chest pain so transferred to  tele, found to have acute PE.       # New onset HFrEF   - TTE: Global LV systolic function was moderately decreased. Left ventricular ejection fraction, by visual estimation, is 30 to 35%.   - start GDMT --> introduced losartan and metoprolol 4/27  - discontinued nifedipine and start ACEI and BB   - not a candidate for LifeVest or AICD due to age, dementia and poor functional status   - case discussed with cardio fellow. patient is not a candidate for ischemic work up. can check TSH and thiamine levels.  consider official cardio consult if any other questions.    # NSVT   - started BB  - keep K >4 and Mg > 2   - keep on tele     # Acute low risk PE  - CTA noted. PE, no right heart strain  - on RA  - Duplex negative  - c/w Lovenox therapeutic, will need to transition to DOAC on dc though will need good dispo as pt has hx of recurrent falls, ideally nursing home, would not be safe on DOAC at home     # Urinary retention started at home  # Gross Hematuria ( traumatic - patient pulled anaya) - resolved   - Possible causes of obstruction include BPH or constipation  - c/w Flomax   - US w no hydro or obstruction   - anaya replaced   - c/w mittens     # Worsening dementia   # Agitation   # H/o Multiple fall, deconditioning   # Multiple Anaya removal by pt w/ trauma and hematuria   - Per family - he lives upstairs and was independant with most ADLs (bathing, walking, eating). This decline represents a significant change from baseline  -- trauma w/up negative    -- Fall precautions   -- Psychiatry consultation appreciated - will follow med recs, started abilify and depakote, add seroquel 25 mg at bedtime.  -- currently on 1:1 --> d/c 1:1 if patient comfortable tomorrow.    # Constipation  - C/w bowel regimen     # incidental CT scan findings:  - Focal area of subcortical white matter hypoattenuation in the left frontoparietal region with questionable associated cortical involvement.    Findings are nonspecific could represent chronic infarct, focal white matter changes including chronic microvascular ischemic changes, focal   gliosis, focal demyelination, If symptoms persist nonemergent outpatient brain MRI can be considered.  -Few ill-defined though nonaggressive appearing lucencies in the calvarium largest in the left frontal region.   - Few scattered lucencies throughout the vertebral column in conjunction with the above findings, nonspecific though can consider multiple myeloma  - if within goc as outpt     DVT prophylaxis Lovenox     DNR/DNI    Pending: SNF placement, needs to be off 1:1

## 2025-04-28 NOTE — BH CONSULTATION LIAISON PROGRESS NOTE - NSBHASSESSMENTFT_PSY_ALL_CORE
Patient is a 89-year-old male with a PMH of HTN, hearing loss, and dementia (baseline A&Ox1), presenting with altered mental status and recent agitation secondary to urinary retention. Flores catheter was placed on admission. Psychiatry was consulted for agitation.     Patient resting at bedside and cooperated with interview. Patient disoriented and confused during visit. Answers were illogical and disorganized.      His current regimen of Depakote and Melatonin seems to have improved his impulsivity and agitation although patient remains confused and disorganized. Sleep impairments place patient at further risk for confusion and at risk for delirium. Will recommend quetiapine to address disorganization and sleep. Patient is likely near baseline and inpatient psychaitric hospitalization is unlikely to improve his overall presentation - he would benefit from ongoing outpatient support for his behavioral disturbances. Patient is a 89-year-old male with a PMH of HTN, hearing loss, and dementia (baseline A&Ox1), presenting with altered mental status and recent agitation secondary to urinary retention. Flores catheter was placed on admission. Psychiatry was consulted for agitation.     Patient resting at bedside and cooperated with interview. Patient disoriented and confused during visit. Answers were illogical and disorganized.     His current regimen of Depakote and Melatonin seems to have improved his impulsivity and agitation although patient remains confused and disorganized. Sleep impairments place patient at further risk for confusion and at risk for delirium. Will recommend quetiapine at bedtime to address disorganization and sleep. Patient is likely near baseline and inpatient psychiatric hospitalization is unlikely to improve his overall presentation - he would benefit from ongoing outpatient support for his behavioral disturbances.

## 2025-04-29 NOTE — DIETITIAN NUTRITION RISK NOTIFICATION - TREATMENT: THE FOLLOWING DIET HAS BEEN RECOMMENDED
Diet, DASH/TLC:   Sodium & Cholesterol Restricted  Supplement Feeding Modality:  Oral  Ensure Plus High Protein Cans or Servings Per Day:  1       Frequency:  Two Times a day (04-29-25 @ 15:54) [Active]

## 2025-04-29 NOTE — DIETITIAN INITIAL EVALUATION ADULT - PERTINENT LABORATORY DATA
04-29    142  |  108  |  10  ----------------------------<  81  3.9   |  20  |  0.9    Ca    8.8      29 Apr 2025 05:08  Mg     2.1     04-28    TPro  6.0  /  Alb  3.2[L]  /  TBili  0.7  /  DBili  x   /  AST  85[H]  /  ALT  61[H]  /  AlkPhos  84  04-29

## 2025-04-29 NOTE — BH CONSULTATION LIAISON PROGRESS NOTE - NSBHFUPINTERVALHXFT_PSY_A_CORE
Patient seen resting at bedside, 1:1 sitter present. He is not oriented to time, place, or situation. Per sitter, patient did not sleep well last night but was not agitated. He ate breakfast this morning. He denies suicidal ideations/intent. Patient's LFTs are trending upwards, we will monitor closely.

## 2025-04-29 NOTE — PROGRESS NOTE ADULT - SUBJECTIVE AND OBJECTIVE BOX
EVENSTOÑO  89y  Male    Reason for Admission:   OVERNIGHT/INTERIM: Pt seen and examined at bedside during AM rounds.    No acute or major events    Vital Signs Last 24 Hrs  T(C): 36.3 (29 Apr 2025 05:05), Max: 36.8 (28 Apr 2025 20:18)  T(F): 97.4 (29 Apr 2025 05:05), Max: 98.3 (28 Apr 2025 20:18)  HR: 74 (29 Apr 2025 05:05) (74 - 78)  BP: 118/74 (29 Apr 2025 05:05) (105/68 - 118/74)  BP(mean): 88 (29 Apr 2025 05:05) (80 - 88)  RR: 24 (29 Apr 2025 05:05) (18 - 24)  SpO2: 96% (29 Apr 2025 05:05) (96% - 96%)    Parameters below as of 29 Apr 2025 05:05  Patient On (Oxygen Delivery Method): nasal cannula        PHYSICAL EXAM:  GENERAL: NAD, well-groomed, well-developed  HEENT - NC/AT, pupils equal and reactive to light,   NECK: Supple  CHEST/LUNG: Clear to auscultation bilaterally; No rales, rhonchi, wheezing  HEART: Regular rate and rhythm; No murmurs, rubs, or gallops  ABDOMEN: Soft, Nontender, Nondistended; Bowel sounds present  EXTREMITIES:   No clubbing, cyanosis, or edema  SKIN: No rashes or lesions    LABS:                          13.3   5.27  )-----------( 222      ( 29 Apr 2025 05:08 )             39.0     04-29    142  |  108  |  10  ----------------------------<  81  3.9   |  20  |  0.9    Ca    8.8      29 Apr 2025 05:08  Mg     2.1     04-28    TPro  6.0  /  Alb  3.2[L]  /  TBili  0.7  /  DBili  x   /  AST  85[H]  /  ALT  61[H]  /  AlkPhos  84  04-29    LIVER FUNCTIONS - ( 29 Apr 2025 05:08 )  Alb: 3.2 g/dL / Pro: 6.0 g/dL / ALK PHOS: 84 U/L / ALT: 61 U/L / AST: 85 U/L / GGT: x             Urinalysis Basic - ( 29 Apr 2025 05:08 )    Color: x / Appearance: x / SG: x / pH: x  Gluc: 81 mg/dL / Ketone: x  / Bili: x / Urobili: x   Blood: x / Protein: x / Nitrite: x   Leuk Esterase: x / RBC: x / WBC x   Sq Epi: x / Non Sq Epi: x / Bacteria: x          MedsMEDICATIONS  (STANDING):  brimonidine 0.2% Ophthalmic Solution 1 Drop(s) Both EYES two times a day  chlorhexidine 2% Cloths 1 Application(s) Topical daily  divalproex  milliGRAM(s) Oral <User Schedule>  divalproex  milliGRAM(s) Oral <User Schedule>  enoxaparin Injectable 80 milliGRAM(s) SubCutaneous every 12 hours  latanoprost 0.005% Ophthalmic Solution 1 Drop(s) Both EYES at bedtime  lisinopril 10 milliGRAM(s) Oral daily  melatonin 10 milliGRAM(s) Oral at bedtime  metoprolol succinate ER 25 milliGRAM(s) Oral daily  polyethylene glycol 3350 17 Gram(s) Oral two times a day  QUEtiapine 25 milliGRAM(s) Oral at bedtime  senna 2 Tablet(s) Oral at bedtime  tamsulosin 0.8 milliGRAM(s) Oral at bedtime  timolol 0.5% Solution 1 Drop(s) Both EYES two times a day    MEDICATIONS  (PRN):  acetaminophen     Tablet .. 650 milliGRAM(s) Oral every 6 hours PRN Temp greater or equal to 38C (100.4F), Mild Pain (1 - 3)  aluminum hydroxide/magnesium hydroxide/simethicone Suspension 30 milliLiter(s) Oral every 4 hours PRN Dyspepsia  ARIPiprazole 2.5 milliGRAM(s) Oral <User Schedule> PRN agitation

## 2025-04-29 NOTE — DIETITIAN INITIAL EVALUATION ADULT - PERTINENT MEDS FT
MEDICATIONS  (STANDING):  brimonidine 0.2% Ophthalmic Solution 1 Drop(s) Both EYES two times a day  chlorhexidine 2% Cloths 1 Application(s) Topical daily  divalproex  milliGRAM(s) Oral <User Schedule>  divalproex  milliGRAM(s) Oral <User Schedule>  enoxaparin Injectable 80 milliGRAM(s) SubCutaneous every 12 hours  latanoprost 0.005% Ophthalmic Solution 1 Drop(s) Both EYES at bedtime  lisinopril 10 milliGRAM(s) Oral daily  melatonin 10 milliGRAM(s) Oral at bedtime  metoprolol succinate ER 25 milliGRAM(s) Oral daily  polyethylene glycol 3350 17 Gram(s) Oral two times a day  QUEtiapine 25 milliGRAM(s) Oral <User Schedule>  senna 2 Tablet(s) Oral at bedtime  tamsulosin 0.8 milliGRAM(s) Oral at bedtime  timolol 0.5% Solution 1 Drop(s) Both EYES two times a day    MEDICATIONS  (PRN):  acetaminophen     Tablet .. 650 milliGRAM(s) Oral every 6 hours PRN Temp greater or equal to 38C (100.4F), Mild Pain (1 - 3)  aluminum hydroxide/magnesium hydroxide/simethicone Suspension 30 milliLiter(s) Oral every 4 hours PRN Dyspepsia  ARIPiprazole 2.5 milliGRAM(s) Oral <User Schedule> PRN agitation

## 2025-04-29 NOTE — DIETITIAN INITIAL EVALUATION ADULT - NS FNS DIET ORDER
Diet, DASH/TLC:   Sodium & Cholesterol Restricted  Supplement Feeding Modality:  Oral  Ensure Plus High Protein Cans or Servings Per Day:  1       Frequency:  Two Times a day (04-29-25 @ 15:54) [Pending Verification By Attending]  Diet, DASH/TLC:   Sodium & Cholesterol Restricted (04-19-25 @ 05:38) [Active]

## 2025-04-29 NOTE — DIETITIAN INITIAL EVALUATION ADULT - ADD RECOMMEND
Interventions:  -Continue current diet order  -Add Ensure Plus 2x/day (provides 350kcal, 20g protein each), Magic Cup 2x/day (provides 290kcal, 9g protein each)  -Provide supplementation of 200mg Thiamine daily for 7 days and Multivitamin daily for 10 days  -Provide supplementation of K, Mg, Phosphorus and glucose PRN if levels drop below reference range (patient is at high risk for refeeding syndrome)    Monitor:  -PO intake  -Diet/texture tolerance  -Weight  -Nutrition related labs; especially K, Mg, Phosporus and glucose   -Nutrition focused physical findings    High Nutrition Risk Follow Up

## 2025-04-29 NOTE — DIETITIAN INITIAL EVALUATION ADULT - ORAL INTAKE PTA/DIET HISTORY
Patient noted with dementia; family at bedside provided history.     Reports adequate appetite at home usually eating 3 small meals/day. Denies difficulty chewing/swallowing. Patient feeds them self. NKFA or [employer]/cultural food restrictions.     Reports UBW is 80.5kg, with unintentional weight loss since admission as the patient has not been eating much in hospital. Current weight documented is 79.4kg (from 4/19/25). Weight taken today at time of RD visit read 67.8kg, reflective of 16% weight loss within 2 weeks (equivalent to 8% weight loss within 1 week).     Patient noted be consuming <25% of meals in hospital per family and staff at bedside. No symptoms of N/V/D/Constipation noted per family. Last BM was today per flowsheet (fecal incontinence).     Family agreed to ONS with Ensure and Magic Cup; ONS were order prior to writing this note.   .

## 2025-04-29 NOTE — PROGRESS NOTE ADULT - ASSESSMENT
89 yo M pmhx htn presenting to the ED for evaluation. pt son at bedside endorsing he has been more confused and speaking "gibberish". pt fell 2 weeks ago, diagnosed with 4 Right sided rib fractures outpatient. Pt also with urinary retention. Pt had anaya placed in the ER on admission and he removed it and it was replaced again and then he removed again. Now with some light hematuria.   Psychiatry consulted to assist with medication management for Dementia with Agitation vs Delirium as QTc > 500 and can not use usual antipsychotics. On 4/23 pt endorsed some chest pain so transferred to  tele, found to have acute PE.       # New onset HFrEF   - TTE: Global LV systolic function was moderately decreased. Left ventricular ejection fraction, by visual estimation, is 30 to 35%.   - start GDMT --> introduced losartan and metoprolol 4/27  - discontinued nifedipine and start ACEI and BB   - not a candidate for LifeVest or AICD due to age, dementia and poor functional status   - case discussed with cardio fellow. patient is not a candidate for ischemic work up. can check TSH and thiamine levels.  consider official cardio consult if any other questions.    # NSVT   - started BB  - keep K >4 and Mg > 2   - keep on tele     # Acute low risk PE  - CTA noted. PE, no right heart strain  - on RA  - Duplex negative  - c/w Lovenox therapeutic, will need to transition to DOAC on dc though will need good dispo as pt has hx of recurrent falls, ideally nursing home, would not be safe on DOAC at home     # Urinary retention started at home  # Gross Hematuria ( traumatic - patient pulled anaya) - resolved   - Possible causes of obstruction include BPH or constipation  - c/w Flomax   - US w no hydro or obstruction   - anaya replaced   - c/w mittens     # Worsening dementia   # Agitation   # H/o Multiple fall, deconditioning   # Multiple Anaya removal by pt w/ trauma and hematuria   - Per family - he lives upstairs and was independant with most ADLs (bathing, walking, eating). This decline represents a significant change from baseline  -- trauma w/up negative    -- Fall precautions   -- Psychiatry consultation appreciated - will follow med recs, started abilify and depakote, add seroquel 25 mg at bedtime.  -- currently on 1:1 --> d/c 1:1 if patient comfortable tomorrow.    # Constipation  - C/w bowel regimen     # incidental CT scan findings:  - Focal area of subcortical white matter hypoattenuation in the left frontoparietal region with questionable associated cortical involvement.    Findings are nonspecific could represent chronic infarct, focal white matter changes including chronic microvascular ischemic changes, focal   gliosis, focal demyelination, If symptoms persist nonemergent outpatient brain MRI can be considered.  -Few ill-defined though nonaggressive appearing lucencies in the calvarium largest in the left frontal region.   - Few scattered lucencies throughout the vertebral column in conjunction with the above findings, nonspecific though can consider multiple myeloma  - if within goc as outpt     DVT prophylaxis Lovenox     DNR/DNI    Pending: SNF placement, needs to be off 1:1   89 yo M pmhx htn presenting to the ED for evaluation. pt son at bedside endorsing he has been more confused and speaking "gibberish". pt fell 2 weeks ago, diagnosed with 4 Right sided rib fractures outpatient. Pt also with urinary retention. Pt had anaya placed in the ER on admission and he removed it and it was replaced again and then he removed again. Now with some light hematuria.   Psychiatry consulted to assist with medication management for Dementia with Agitation vs Delirium as QTc > 500 and can not use usual antipsychotics. On 4/23 pt endorsed some chest pain so transferred to  tele, found to have acute PE.     # New onset HFrEF   - TTE: Global LV systolic function was moderately decreased. Left ventricular ejection fraction, by visual estimation, is 30 to 35%.   - start GDMT --> introduced losartan and metoprolol 4/27  - discontinued nifedipine and start ACEI and BB   - not a candidate for LifeVest or AICD due to age, dementia and poor functional status   - case discussed with cardio fellow. patient is not a candidate for ischemic work up. TSH = wnl, thiamine level ordered. Consider official cardio consult if any other questions.    # NSVT   - started BB  - keep K >4 and Mg > 2   - keep on tele     # Acute low risk PE  - CTA noted. PE, no right heart strain  - on RA  - Duplex negative  - c/w Lovenox therapeutic, will need to transition to DOAC on dc though will need good dispo as pt has hx of recurrent falls, ideally nursing home, would not be safe on DOAC at home     # Urinary retention started at home  # Gross Hematuria ( traumatic - patient pulled anaya) - resolved   - Possible causes of obstruction include BPH or constipation  - c/w Flomax   - US w no hydro or obstruction   - anaya replaced   - c/w mittens     # Worsening dementia   # Agitation   # H/o Multiple fall, deconditioning   # Multiple Anaya removal by pt w/ trauma and hematuria   - Per family - he lives upstairs and was independant with most ADLs (bathing, walking, eating). This decline represents a significant change from baseline  -- trauma w/up negative    -- Fall precautions   -- Psychiatry consultation appreciated - will follow med recs, started abilify and depakote, added seroquel 25 mg at bedtime on 04/29 -> time changed to 6 pm daily.  -- on bilateral mittens -> can d/c if patient is comfortable tomorrow.    #Transaminitis  - ? 2/2 medications  - monitor LFTs --> if trending up adjust Depakote dose and further w/up including RUQ sono    # Constipation  - C/w bowel regimen     # incidental CT scan findings:  - Focal area of subcortical white matter hypoattenuation in the left frontoparietal region with questionable associated cortical involvement.    Findings are nonspecific could represent chronic infarct, focal white matter changes including chronic microvascular ischemic changes, focal   gliosis, focal demyelination, If symptoms persist nonemergent outpatient brain MRI can be considered.  -Few ill-defined though nonaggressive appearing lucencies in the calvarium largest in the left frontal region.   - Few scattered lucencies throughout the vertebral column in conjunction with the above findings, nonspecific though can consider multiple myeloma  - if within goc as outpt     DVT prophylaxis Lovenox     DNR/DNI    Pending: SNF placement, improvement in agitation.

## 2025-04-29 NOTE — BH CONSULTATION LIAISON PROGRESS NOTE - NSBHASSESSMENTFT_PSY_ALL_CORE
Patient is a 89-year-old male with a PMH of HTN, hearing loss, and dementia (baseline A&Ox1), presenting with altered mental status and recent agitation secondary to urinary retention. Flores catheter was placed on admission. Psychiatry was consulted for agitation.     Patient sleeping at bedside. Patient disoriented and confused, but less agitated per 1:1 sitter.   His current regimen of Depakote and Melatonin seems to have improved his impulsivity and agitation although patient remains confused and disorganized. Sleep impairments place patient at further risk for confusion and at risk for delirium. Will recommend to quetiapine at bedtime to address disorganization and sleep. Will monitor trending LFTs level. May consider adjusting Depakote dosage if they continue to trend upwards. Patient is likely near baseline and inpatient psychiatric hospitalization is unlikely to improve his overall presentation - he would benefit from ongoing outpatient support for his behavioral disturbances.

## 2025-04-29 NOTE — DIETITIAN INITIAL EVALUATION ADULT - OTHER INFO
Patient is a 89 yo M pmhx htn presenting to the ED for evaluation. pt son at bedside endorsing he has been more confused and speaking "gibberish". pt fell 2 weeks ago, diagnosed with 4 Right sided rib fractures outpatient.     Patient noted with anemia, hyperchloremia, elevated AST, elevated ALT, elevated troponin, elevated BNP.

## 2025-04-29 NOTE — BH CONSULTATION LIAISON PROGRESS NOTE - NSBHCONSULTRECOMMENDOTHER_PSY_A_CORE FT
- Continue quetiapine 25 mg qHS for sleep  - Continue depakote to 250mg QAM and 500mg QPM for behavioral disturbances  - Patient at significant risk for delirium; please implement non-pharmacological interventions when possible    Recommendations to nursing for non-pharmacological interventions for delirium management:  1.	Reorient Frequently   2.	Encourage Early Mobility   3.	Encourage talking to patient prior to hands on care    4.	Prevent overstimulation     5.	Prevent Day night Reversal : Out of bed at least 3 times during the day. Can be sitting in chair or walking in unit with assistance or even sitting on bed   6.	Curtains up from 8 am to 8 pm    7.	At least 6 hours of uninterrupted sleep at night    8.	Avoid Benzodiazepines, Anticholinergics and antihistaminergics   9.	Avoid Restraints : if needed utilize the least restrictive form : 1:1  <hand mitts< 2 point soft < 4 point soft < 2 point hard <  4 point hard    10.	Treat underlying cause    11.	Maintain K>4 and Mg >2 at all times

## 2025-04-29 NOTE — DIETITIAN INITIAL EVALUATION ADULT - OTHER CALCULATIONS
Energy: 1787-2170kcal/day (using MSJ x 1.4-1.7) with consideration for age, weight status, severe PCM, rib fractures, HF  Protein: 102-115g/day (using 1.5-1.7g/kg) for same reasons as above  Fluid: 1500mL/day or per MD recommendations for same reasons as above

## 2025-04-30 NOTE — BH CONSULTATION LIAISON PROGRESS NOTE - NSBHCONSULTFOLLOWAFTERCARE_PSY_A_CORE FT
- patient should engage in outpatient neurological follow up
- continue psychiatric/neurological treatment in outpatient setting
- continue psychiatric/neurological treatment in outpatient setting
- patient should engage in outpatient neurological follow up
- continue psychiatric/neurological treatment in outpatient setting

## 2025-04-30 NOTE — BH CONSULTATION LIAISON PROGRESS NOTE - NSBHINDICATION_PSY_ALL_CORE
Agree with medical team due to medical interference
safety
safety
safety, per medical team
Agree with medical team due to medical interference

## 2025-04-30 NOTE — PROGRESS NOTE ADULT - SUBJECTIVE AND OBJECTIVE BOX
EVENSTOÑO  89y  Male    Reason for Admission:   OVERNIGHT/INTERIM: Pt seen and examined at bedside during AM rounds.    No acute or major events.     Vital Signs Last 24 Hrs  T(C): 36.7 (30 Apr 2025 05:17), Max: 36.7 (30 Apr 2025 05:17)  T(F): 98 (30 Apr 2025 05:17), Max: 98 (30 Apr 2025 05:17)  HR: 73 (30 Apr 2025 05:17) (73 - 92)  BP: 116/71 (30 Apr 2025 05:17) (116/71 - 138/82)  BP(mean): 86 (30 Apr 2025 05:17) (86 - 86)  RR: 18 (30 Apr 2025 05:17) (18 - 18)  SpO2: 99% (29 Apr 2025 12:04) (99% - 99%)          LABS:               13.3   7.71  )-----------( 267      ( 30 Apr 2025 05:01 )             39.9     04-30    142  |  108  |  11  ----------------------------<  75  4.0   |  22  |  0.9    Ca    8.4      30 Apr 2025 05:01    TPro  6.3  /  Alb  3.4[L]  /  TBili  0.6  /  DBili  x   /  AST  67[H]  /  ALT  63[H]  /  AlkPhos  77  04-30      MedsMEDICATIONS  (STANDING):  brimonidine 0.2% Ophthalmic Solution 1 Drop(s) Both EYES two times a day  chlorhexidine 2% Cloths 1 Application(s) Topical daily  divalproex  milliGRAM(s) Oral <User Schedule>  divalproex  milliGRAM(s) Oral <User Schedule>  enoxaparin Injectable 80 milliGRAM(s) SubCutaneous every 12 hours  latanoprost 0.005% Ophthalmic Solution 1 Drop(s) Both EYES at bedtime  lisinopril 10 milliGRAM(s) Oral daily  melatonin 10 milliGRAM(s) Oral at bedtime  metoprolol succinate ER 25 milliGRAM(s) Oral daily  multivitamin 1 Tablet(s) Oral daily  polyethylene glycol 3350 17 Gram(s) Oral two times a day  QUEtiapine 25 milliGRAM(s) Oral <User Schedule>  senna 2 Tablet(s) Oral at bedtime  tamsulosin 0.8 milliGRAM(s) Oral at bedtime  thiamine 200 milliGRAM(s) Oral daily  timolol 0.5% Solution 1 Drop(s) Both EYES two times a day    MEDICATIONS  (PRN):  acetaminophen     Tablet .. 650 milliGRAM(s) Oral every 6 hours PRN Temp greater or equal to 38C (100.4F), Mild Pain (1 - 3)  aluminum hydroxide/magnesium hydroxide/simethicone Suspension 30 milliLiter(s) Oral every 4 hours PRN Dyspepsia  ARIPiprazole 2.5 milliGRAM(s) Oral <User Schedule> PRN agitation

## 2025-04-30 NOTE — BH CONSULTATION LIAISON PROGRESS NOTE - NSBHASSESSMENTFT_PSY_ALL_CORE
Patient is a 89-year-old male with a PMH of HTN, hearing loss, and dementia (baseline A&Ox1), presenting with altered mental status and recent agitation secondary to urinary retention. Flores catheter was placed on admission. Psychiatry was consulted for agitation.     Patient is more alert today, no longer agitated, smiling at staff. Patient is no longer in restraints, has 1:1 sitter present. His impulsivity and agitation have improved on his current regimen of Depakote, Seroquel, and Melatonin, although patient remains confused and disorganized. Recommend monitoring trending LFTs level and adjusting Depakote dosage if they continue to trend upwards. Patient is likely near baseline and inpatient psychiatric hospitalization is unlikely to improve his overall presentation - he would benefit from ongoing outpatient support for his behavioral disturbances. Psychiatry team to sign off for now. Please reconsult if needed.

## 2025-04-30 NOTE — CONSULT NOTE ADULT - NS ATTEND AMEND GEN_ALL_CORE FT
I personally saw and examined the patient, reviewed the chart and available data. I discussed the situation with the care team. I also reviewed and/or amended the note as necessary.

## 2025-04-30 NOTE — BH CONSULTATION LIAISON PROGRESS NOTE - CURRENT MEDICATION
MEDICATIONS  (STANDING):  brimonidine 0.2% Ophthalmic Solution 1 Drop(s) Both EYES two times a day  chlorhexidine 2% Cloths 1 Application(s) Topical daily  divalproex  milliGRAM(s) Oral <User Schedule>  divalproex  milliGRAM(s) Oral <User Schedule>  enoxaparin Injectable 80 milliGRAM(s) SubCutaneous every 12 hours  latanoprost 0.005% Ophthalmic Solution 1 Drop(s) Both EYES at bedtime  lisinopril 10 milliGRAM(s) Oral daily  melatonin 10 milliGRAM(s) Oral at bedtime  metoprolol succinate ER 25 milliGRAM(s) Oral daily  polyethylene glycol 3350 17 Gram(s) Oral two times a day  QUEtiapine 25 milliGRAM(s) Oral at bedtime  senna 2 Tablet(s) Oral at bedtime  tamsulosin 0.8 milliGRAM(s) Oral at bedtime  timolol 0.5% Solution 1 Drop(s) Both EYES two times a day    MEDICATIONS  (PRN):  acetaminophen     Tablet .. 650 milliGRAM(s) Oral every 6 hours PRN Temp greater or equal to 38C (100.4F), Mild Pain (1 - 3)  aluminum hydroxide/magnesium hydroxide/simethicone Suspension 30 milliLiter(s) Oral every 4 hours PRN Dyspepsia  ARIPiprazole 2.5 milliGRAM(s) Oral <User Schedule> PRN agitation  
MEDICATIONS  (STANDING):  brimonidine 0.2% Ophthalmic Solution 1 Drop(s) Both EYES two times a day  chlorhexidine 2% Cloths 1 Application(s) Topical daily  divalproex  milliGRAM(s) Oral <User Schedule>  divalproex  milliGRAM(s) Oral <User Schedule>  enoxaparin Injectable 80 milliGRAM(s) SubCutaneous every 12 hours  latanoprost 0.005% Ophthalmic Solution 1 Drop(s) Both EYES at bedtime  lisinopril 10 milliGRAM(s) Oral daily  melatonin 10 milliGRAM(s) Oral at bedtime  metoprolol succinate ER 25 milliGRAM(s) Oral daily  polyethylene glycol 3350 17 Gram(s) Oral two times a day  senna 2 Tablet(s) Oral at bedtime  tamsulosin 0.8 milliGRAM(s) Oral at bedtime  timolol 0.5% Solution 1 Drop(s) Both EYES two times a day    MEDICATIONS  (PRN):  acetaminophen     Tablet .. 650 milliGRAM(s) Oral every 6 hours PRN Temp greater or equal to 38C (100.4F), Mild Pain (1 - 3)  aluminum hydroxide/magnesium hydroxide/simethicone Suspension 30 milliLiter(s) Oral every 4 hours PRN Dyspepsia  ARIPiprazole 2.5 milliGRAM(s) Oral <User Schedule> PRN agitation  
MEDICATIONS  (STANDING):  brimonidine 0.2% Ophthalmic Solution 1 Drop(s) Both EYES two times a day  chlorhexidine 2% Cloths 1 Application(s) Topical daily  divalproex  milliGRAM(s) Oral <User Schedule>  divalproex  milliGRAM(s) Oral <User Schedule>  enoxaparin Injectable 80 milliGRAM(s) SubCutaneous every 12 hours  latanoprost 0.005% Ophthalmic Solution 1 Drop(s) Both EYES at bedtime  melatonin 10 milliGRAM(s) Oral at bedtime  NIFEdipine XL 30 milliGRAM(s) Oral daily  polyethylene glycol 3350 17 Gram(s) Oral two times a day  senna 2 Tablet(s) Oral at bedtime  tamsulosin 0.8 milliGRAM(s) Oral at bedtime  timolol 0.5% Solution 1 Drop(s) Both EYES two times a day    MEDICATIONS  (PRN):  acetaminophen     Tablet .. 650 milliGRAM(s) Oral every 6 hours PRN Temp greater or equal to 38C (100.4F), Mild Pain (1 - 3)  aluminum hydroxide/magnesium hydroxide/simethicone Suspension 30 milliLiter(s) Oral every 4 hours PRN Dyspepsia  ARIPiprazole 2.5 milliGRAM(s) Oral <User Schedule> PRN agitation  
MEDICATIONS  (STANDING):  brimonidine 0.2% Ophthalmic Solution 1 Drop(s) Both EYES two times a day  chlorhexidine 2% Cloths 1 Application(s) Topical daily  divalproex  milliGRAM(s) Oral <User Schedule>  divalproex  milliGRAM(s) Oral <User Schedule>  enoxaparin Injectable 80 milliGRAM(s) SubCutaneous every 12 hours  latanoprost 0.005% Ophthalmic Solution 1 Drop(s) Both EYES at bedtime  melatonin 10 milliGRAM(s) Oral at bedtime  NIFEdipine XL 30 milliGRAM(s) Oral daily  polyethylene glycol 3350 17 Gram(s) Oral two times a day  potassium chloride   Powder 40 milliEquivalent(s) Oral once  senna 2 Tablet(s) Oral at bedtime  tamsulosin 0.8 milliGRAM(s) Oral at bedtime  timolol 0.5% Solution 1 Drop(s) Both EYES two times a day    MEDICATIONS  (PRN):  acetaminophen     Tablet .. 650 milliGRAM(s) Oral every 6 hours PRN Temp greater or equal to 38C (100.4F), Mild Pain (1 - 3)  aluminum hydroxide/magnesium hydroxide/simethicone Suspension 30 milliLiter(s) Oral every 4 hours PRN Dyspepsia  ARIPiprazole 2.5 milliGRAM(s) Oral <User Schedule> PRN agitation  
MEDICATIONS  (STANDING):  brimonidine 0.2% Ophthalmic Solution 1 Drop(s) Both EYES two times a day  chlorhexidine 2% Cloths 1 Application(s) Topical daily  divalproex  milliGRAM(s) Oral <User Schedule>  divalproex  milliGRAM(s) Oral <User Schedule>  enoxaparin Injectable 80 milliGRAM(s) SubCutaneous every 12 hours  latanoprost 0.005% Ophthalmic Solution 1 Drop(s) Both EYES at bedtime  lisinopril 10 milliGRAM(s) Oral daily  melatonin 10 milliGRAM(s) Oral at bedtime  metoprolol succinate ER 25 milliGRAM(s) Oral daily  multivitamin 1 Tablet(s) Oral daily  polyethylene glycol 3350 17 Gram(s) Oral two times a day  QUEtiapine 25 milliGRAM(s) Oral <User Schedule>  senna 2 Tablet(s) Oral at bedtime  tamsulosin 0.8 milliGRAM(s) Oral at bedtime  thiamine 200 milliGRAM(s) Oral daily  timolol 0.5% Solution 1 Drop(s) Both EYES two times a day    MEDICATIONS  (PRN):  acetaminophen     Tablet .. 650 milliGRAM(s) Oral every 6 hours PRN Temp greater or equal to 38C (100.4F), Mild Pain (1 - 3)  aluminum hydroxide/magnesium hydroxide/simethicone Suspension 30 milliLiter(s) Oral every 4 hours PRN Dyspepsia  ARIPiprazole 2.5 milliGRAM(s) Oral <User Schedule> PRN agitation

## 2025-04-30 NOTE — BH CONSULTATION LIAISON PROGRESS NOTE - ATTENDING COMMENTS
This patient was seen, evaluated, and treated by the nurse practitioner. I reviewed the chart and care seems appropriate. I was available to the NP for questions and consultation at the time patient was being evaluated.

## 2025-04-30 NOTE — BH CONSULTATION LIAISON PROGRESS NOTE - NSBHCONSULTRECOMMENDOTHER_PSY_A_CORE FT
- Continue quetiapine 25 mg qHS for sleep  - Continue depakote to 250mg QAM and 500mg QPM for behavioral disturbances, monitor LFTs  - Patient at significant risk for delirium; please implement non-pharmacological interventions when possible  - Psychiatry to sign off, please reconsult if needed.    Recommendations to nursing for non-pharmacological interventions for delirium management:  1.	Reorient Frequently   2.	Encourage Early Mobility   3.	Encourage talking to patient prior to hands on care    4.	Prevent overstimulation     5.	Prevent Day night Reversal : Out of bed at least 3 times during the day. Can be sitting in chair or walking in unit with assistance or even sitting on bed   6.	Curtains up from 8 am to 8 pm    7.	At least 6 hours of uninterrupted sleep at night    8.	Avoid Benzodiazepines, Anticholinergics and antihistaminergics   9.	Avoid Restraints : if needed utilize the least restrictive form : 1:1  <hand mitts< 2 point soft < 4 point soft < 2 point hard <  4 point hard    10.	Treat underlying cause    11.	Maintain K>4 and Mg >2 at all times

## 2025-04-30 NOTE — BH CONSULTATION LIAISON PROGRESS NOTE - NSBHFUPINTERVALHXFT_PSY_A_CORE
Patient seen resting at bedside with 1:1 sitter present. Patient is not oriented to time, place, or situation. Patient's speech is disorganized, smiling with staff. Patient reports he slept well last night. Per sitter, he ate breakfast and was not agitated over night. Hand mitt restraints were discontinued yesterday. He denies any suicidal ideation/intent. He denies any auditory/ visual hallucinations. Mildly elevated liver function tests (LFTs) persist, showing a slight decrease from the previous day's values.

## 2025-04-30 NOTE — CONSULT NOTE ADULT - ASSESSMENT
Pt left a msg stating that you were going to call in #10 Diazepam 5 mg, as she will need to fly next week.  Pharm is [facility].   Pt is a 89 yo M pmhx HTN a/w AMS. Hx obtained from chart and other collateral sources of On 4/19/25, pt was found to be retaining urine in the ED (631cc) with pt unable to void so anaya catheter was placed by ED staff admitted to medicine with AMS, lethargy and rib fx that were likely sustained from a fall a couple of weeks. During this time, pt had remained confused and pulled on his catheter causing him to become hematuric which quickly resolved.  recalled for evaluation macroscopic hematuria (traumatic Anaya     Plan:  - Irrigated catheter with 150cc of SW, until draining pink tinged to clear urine without clots. Mummy wrap of tubing applied to R leg to prevent further prostatic urethral injury.   - Continue irrigation of catheter 60-120cc of SW qshift/prn until hematuria is clear   - Flomax if not contraindicated   - Continue Anaya care   - Continue 1:1 sit as per primary team   - F/u OP with  for further management and Anaya care   - spoke with RN   - Will d/w attending      Pt is a 89 yo M pmhx HTN a/w AMS. Hx obtained from chart and other collateral sources of On 4/19/25, pt was found to be retaining urine in the ED (631cc) with pt unable to void so anaya catheter was placed by ED staff admitted to medicine with AMS, lethargy and rib fx that were likely sustained from a fall a couple of weeks. During this time, pt had remained confused and pulled on his catheter causing him to become hematuric which quickly resolved.  recalled for evaluation macroscopic hematuria (traumatic Anaya     Plan:  - Irrigated catheter with 150cc of SW, until draining pink tinged to clear urine without clots. Mummy wrap of tubing applied to R leg to prevent further prostatic urethral injury.   - Continue irrigation of catheter 60-120cc of SW qshift/prn until hematuria is clear   - Flomax if not contraindicated   - Continue Anaya care   - Continue 1:1 sit as per primary team   - F/u OP with  for further management and Anaya care   - spoke with RN   - Will d/w attending       pt minimally responsive anaya in place without d/c and urine is clearing  when clear consider [facility]IC vs rehab abd tov once ambulating if able  PRN SP tube

## 2025-04-30 NOTE — PROGRESS NOTE ADULT - ASSESSMENT
Pt is a 89 yo M pmhx HTN a/w AMS. Hx obtained from chart and other collateral sources of On 4/19/25, pt was found to be retaining urine in the ED (631cc) with pt unable to void so anaya catheter was placed by ED staff admitted to medicine with AMS, lethargy and rib fx that were likely sustained from a fall a couple of weeks. During this time, pt had remained confused and pulled on his catheter causing him to become hematuric which quickly resolved.  recalled for evaluation macroscopic hematuria (traumatic Anaya     Plan:  - Irrigated catheter with 150cc of SW, until draining pink tinged to clear urine without clots. Mummy wrap of tubing applied to R leg to prevent further prostatic urethral injury.   - Continue irrigation of catheter 60-120cc of SW qshift/prn until hematuria is clear   - Flomax if not contraindicated   - Continue Anaya care   - Continue 1:1 sit as per primary team   - F/u OP with  for further management and Anaya care   - spoke with RN   - Will d/w attending

## 2025-04-30 NOTE — BH CONSULTATION LIAISON PROGRESS NOTE - NSICDXBHPRIMARYDX_PSY_ALL_CORE
Dementia with behavioral disturbance   F03.91  
Dementia with behavioral disturbance   F03.917  
Dementia with behavioral disturbance   F03.913  
Dementia with behavioral disturbance   F03.912  
Dementia with behavioral disturbance   F03.910

## 2025-04-30 NOTE — BH CONSULTATION LIAISON PROGRESS NOTE - NSBHMSESPEECH_PSY_A_CORE
Patient notified that per Fior that his ultrasound of his legs does not show any indications of blood clots  Patient expressed understanding.  
Abnormal as indicated, otherwise normal...
Normal volume, rate, productivity, spontaneity and articulation
Normal volume, rate, productivity, spontaneity and articulation
Abnormal as indicated, otherwise normal...
Abnormal as indicated, otherwise normal...

## 2025-04-30 NOTE — BH CONSULTATION LIAISON PROGRESS NOTE - NSBHCHARTREVIEWVS_PSY_A_CORE FT
Vital Signs Last 24 Hrs  T(C): 36.6 (25 Apr 2025 04:37), Max: 36.8 (24 Apr 2025 20:41)  T(F): 97.9 (25 Apr 2025 04:37), Max: 98.2 (24 Apr 2025 20:41)  HR: 96 (25 Apr 2025 04:37) (96 - 97)  BP: 149/92 (25 Apr 2025 04:37) (136/86 - 149/92)  BP(mean): 111 (25 Apr 2025 04:37) (111 - 111)  RR: 18 (25 Apr 2025 04:37) (17 - 18)  SpO2: 92% (25 Apr 2025 08:28) (92% - 95%)    Parameters below as of 25 Apr 2025 08:28  Patient On (Oxygen Delivery Method): room air    
Vital Signs Last 24 Hrs  T(C): 36.3 (29 Apr 2025 05:05), Max: 36.8 (28 Apr 2025 20:18)  T(F): 97.4 (29 Apr 2025 05:05), Max: 98.3 (28 Apr 2025 20:18)  HR: 74 (29 Apr 2025 05:05) (74 - 78)  BP: 118/74 (29 Apr 2025 05:05) (105/68 - 118/74)  BP(mean): 88 (29 Apr 2025 05:05) (80 - 88)  RR: 24 (29 Apr 2025 05:05) (18 - 24)  SpO2: 96% (29 Apr 2025 05:05) (96% - 96%)    Parameters below as of 29 Apr 2025 05:05  Patient On (Oxygen Delivery Method): nasal cannula    
Vital Signs Last 24 Hrs  T(C): 36.3 (28 Apr 2025 05:00), Max: 36.3 (27 Apr 2025 13:31)  T(F): 97.4 (28 Apr 2025 05:00), Max: 97.4 (28 Apr 2025 05:00)  HR: 77 (28 Apr 2025 05:00) (77 - 97)  BP: 118/74 (28 Apr 2025 05:00) (107/70 - 131/87)  BP(mean): 89 (28 Apr 2025 05:00) (89 - 89)  RR: 18 (28 Apr 2025 05:00) (18 - 18)  SpO2: 96% (28 Apr 2025 05:02) (86% - 96%)    Parameters below as of 28 Apr 2025 05:02  Patient On (Oxygen Delivery Method): nasal cannula  O2 Flow (L/min): 2  
Vital Signs Last 24 Hrs  T(C): 36.7 (30 Apr 2025 05:17), Max: 36.7 (30 Apr 2025 05:17)  T(F): 98 (30 Apr 2025 05:17), Max: 98 (30 Apr 2025 05:17)  HR: 73 (30 Apr 2025 05:17) (73 - 92)  BP: 116/71 (30 Apr 2025 05:17) (116/71 - 138/82)  BP(mean): 86 (30 Apr 2025 05:17) (86 - 86)  RR: 18 (30 Apr 2025 05:17) (18 - 18)  SpO2: 99% (29 Apr 2025 12:04) (99% - 99%)    
Vital Signs Last 24 Hrs  T(C): 36.8 (24 Apr 2025 04:56), Max: 36.8 (24 Apr 2025 04:56)  T(F): 98.2 (24 Apr 2025 04:56), Max: 98.2 (24 Apr 2025 04:56)  HR: 94 (24 Apr 2025 04:56) (51 - 94)  BP: 90/65 (24 Apr 2025 04:56) (90/65 - 150/62)  BP(mean): 73 (24 Apr 2025 04:56) (73 - 73)  RR: 18 (24 Apr 2025 04:56) (16 - 18)  SpO2: 91% (24 Apr 2025 08:06) (91% - 98%)    Parameters below as of 24 Apr 2025 08:06  Patient On (Oxygen Delivery Method): room air

## 2025-04-30 NOTE — PROGRESS NOTE ADULT - SUBJECTIVE AND OBJECTIVE BOX
Urology Progress Note:   Pt i a 89 yo M pmhx htn presenting to the ED for evaluation. pt son at bedside endorsing he has been more confused and speaking "gibberish". pt fell 2 weeks ago, diagnosed with 4 R sided rib fractures outpatient. pt also with urinary retention, seen in the ED earlier today and had anaya placed. Denies fever, chills, abd pain, chest pain, calf pain, nausea, vomiting, headache.    PAST MEDICAL & SURGICAL HISTORY:  Hypertension    [ x ]  Due to altered mental status/intubation, subjective information was not able to be obtained from the patient. History was obtained to the extent possible from review of the chart and collateral sources of information.     MEDICATIONS  (STANDING):  brimonidine 0.2% Ophthalmic Solution 1 Drop(s) Both EYES two times a day  chlorhexidine 2% Cloths 1 Application(s) Topical daily  divalproex  milliGRAM(s) Oral <User Schedule>  divalproex  milliGRAM(s) Oral <User Schedule>  enoxaparin Injectable 80 milliGRAM(s) SubCutaneous every 12 hours  latanoprost 0.005% Ophthalmic Solution 1 Drop(s) Both EYES at bedtime  lisinopril 10 milliGRAM(s) Oral daily  melatonin 10 milliGRAM(s) Oral at bedtime  metoprolol succinate ER 25 milliGRAM(s) Oral daily  multivitamin 1 Tablet(s) Oral daily  polyethylene glycol 3350 17 Gram(s) Oral two times a day  QUEtiapine 25 milliGRAM(s) Oral <User Schedule>  senna 2 Tablet(s) Oral at bedtime  tamsulosin 0.8 milliGRAM(s) Oral at bedtime  thiamine 200 milliGRAM(s) Oral daily  timolol 0.5% Solution 1 Drop(s) Both EYES two times a day    MEDICATIONS  (PRN):  acetaminophen     Tablet .. 650 milliGRAM(s) Oral every 6 hours PRN Temp greater or equal to 38C (100.4F), Mild Pain (1 - 3)  aluminum hydroxide/magnesium hydroxide/simethicone Suspension 30 milliLiter(s) Oral every 4 hours PRN Dyspepsia  ARIPiprazole 2.5 milliGRAM(s) Oral <User Schedule> PRN agitation      Allergies: No Known Allergies    SOCIAL HISTORY: No illicit drug use    PHYSICAL EXAM:  Constitutional: NAD, well-developed  Back: No CVA ttp bilaterally  Resp: No accessory respiratory muscle use  Abd: Soft, NT/ND. No suprapubic ttp  : Uncircumcised, testicles x2 nottp and symmetric. +anaya in place draining pink tinged to clear urine without clots after irrigating with 150cc SW.   Neuro: Awake and confused   Psych: Normal mood, normal affect  Skin: Good color, non diaphoretic    Patient consented verbally to a pelvic examination/anaya placement with CHERRY Jackman present as a chaperone.     Vital Signs Last 24 Hrs  T(C): 36.2 (30 Apr 2025 12:08), Max: 36.7 (30 Apr 2025 05:17)  T(F): 97.2 (30 Apr 2025 12:08), Max: 98 (30 Apr 2025 05:17)  HR: 63 (30 Apr 2025 12:08) (63 - 92)  BP: 127/80 (30 Apr 2025 12:08) (116/71 - 127/80)  BP(mean): 86 (30 Apr 2025 05:17) (86 - 86)  RR: 18 (30 Apr 2025 12:08) (18 - 18)  SpO2: 99% (30 Apr 2025 12:08) (99% - 99%)        I&O's Summary    29 Apr 2025 07:01  -  30 Apr 2025 07:00  --------------------------------------------------------  IN: 100 mL / OUT: 400 mL / NET: -300 mL    30 Apr 2025 07:01  -  30 Apr 2025 16:18  --------------------------------------------------------  IN: 300 mL / OUT: 200 mL / NET: 100 mL        LABS:                        13.3   7.71  )-----------( 267      ( 30 Apr 2025 05:01 )             39.9     04-30  142  |  108  |  11  ----------------------------<  75  4.0   |  22  |  0.9    Ca    8.4      30 Apr 2025 05:01    TPro  6.3  /  Alb  3.4[L]  /  TBili  0.6  /  DBili  x   /  AST  67[H]  /  ALT  63[H]  /  AlkPhos  77  04-30        Urinalysis Basic - ( 30 Apr 2025 05:01 )  Color: x / Appearance: x / SG: x / pH: x  Gluc: 75 mg/dL / Ketone: x  / Bili: x / Urobili: x   Blood: x / Protein: x / Nitrite: x   Leuk Esterase: x / RBC: x / WBC x   Sq Epi: x / Non Sq Epi: x / Bacteria: x Urology Progress Note:   Pt is a 89 yo M pmhx HTN a/w AMS. Hx obtained from chart and other collateral sources of On 4/19/25, pt was found to be retaining urine in the ED (631cc) with pt unable to void so anaya catheter was placed by ED staff. Patient was admitted to Medicine for further evaluation of AMS, lethargy and rib fx that were likely sustained from a fall a couple of weeks prior per chart. During this time, pt had remained confused and pulled on his catheter causing him to become hematuric which quickly resolved.   On 4/21 patient was given a TOV and passed when US showed a PVR of 23cc.   On 4/23 Per chart, patient had again developed urinary retention (bedside bladder scan showed a high PVR per chart though exact amount unknown at this time) so decision was made by primary team to place anaya catheter. Patient again developed confusion and pulled on his catheter again causing him to become hematuric so patient had been placed on restraints.  On 4/24 patient was found to have an acute right sided PE and had been started on AC. Remains on a 1:1 sit.     Today 4/27 RN called  team for assistance with catheter irrigation. Anaya catheter at this time draining clear dark yellow urine without any blood or blood clots noted to be draining within the anaya catheter tubing. Irrigated anaya catheter with 150cc of SW without issues and left anaya catheter draining clear yellow urine without any clots noted in tubing.    PAST MEDICAL & SURGICAL HISTORY:  Hypertension    [ x ]  Due to altered mental status/intubation, subjective information was not able to be obtained from the patient. History was obtained to the extent possible from review of the chart and collateral sources of information.     MEDICATIONS  (STANDING):  brimonidine 0.2% Ophthalmic Solution 1 Drop(s) Both EYES two times a day  chlorhexidine 2% Cloths 1 Application(s) Topical daily  divalproex  milliGRAM(s) Oral <User Schedule>  divalproex  milliGRAM(s) Oral <User Schedule>  enoxaparin Injectable 80 milliGRAM(s) SubCutaneous every 12 hours  latanoprost 0.005% Ophthalmic Solution 1 Drop(s) Both EYES at bedtime  lisinopril 10 milliGRAM(s) Oral daily  melatonin 10 milliGRAM(s) Oral at bedtime  metoprolol succinate ER 25 milliGRAM(s) Oral daily  multivitamin 1 Tablet(s) Oral daily  polyethylene glycol 3350 17 Gram(s) Oral two times a day  QUEtiapine 25 milliGRAM(s) Oral <User Schedule>  senna 2 Tablet(s) Oral at bedtime  tamsulosin 0.8 milliGRAM(s) Oral at bedtime  thiamine 200 milliGRAM(s) Oral daily  timolol 0.5% Solution 1 Drop(s) Both EYES two times a day    MEDICATIONS  (PRN):  acetaminophen     Tablet .. 650 milliGRAM(s) Oral every 6 hours PRN Temp greater or equal to 38C (100.4F), Mild Pain (1 - 3)  aluminum hydroxide/magnesium hydroxide/simethicone Suspension 30 milliLiter(s) Oral every 4 hours PRN Dyspepsia  ARIPiprazole 2.5 milliGRAM(s) Oral <User Schedule> PRN agitation      Allergies: No Known Allergies    SOCIAL HISTORY: No illicit drug use    PHYSICAL EXAM:  Constitutional: NAD, well-developed  Back: No CVA ttp bilaterally  Resp: No accessory respiratory muscle use  Abd: Soft, NT/ND. No suprapubic ttp  : Uncircumcised, testicles x2 nottp and symmetric. +anaya in place draining pink tinged to clear urine without clots after irrigating with 150cc SW.   Neuro: Awake and confused   Psych: Normal mood, normal affect  Skin: Good color, non diaphoretic    Patient consented verbally to a pelvic examination/anaya placement with CHERRY Jackman present as a chaperone.     Vital Signs Last 24 Hrs  T(C): 36.2 (30 Apr 2025 12:08), Max: 36.7 (30 Apr 2025 05:17)  T(F): 97.2 (30 Apr 2025 12:08), Max: 98 (30 Apr 2025 05:17)  HR: 63 (30 Apr 2025 12:08) (63 - 92)  BP: 127/80 (30 Apr 2025 12:08) (116/71 - 127/80)  BP(mean): 86 (30 Apr 2025 05:17) (86 - 86)  RR: 18 (30 Apr 2025 12:08) (18 - 18)  SpO2: 99% (30 Apr 2025 12:08) (99% - 99%)        I&O's Summary    29 Apr 2025 07:01  -  30 Apr 2025 07:00  --------------------------------------------------------  IN: 100 mL / OUT: 400 mL / NET: -300 mL    30 Apr 2025 07:01  -  30 Apr 2025 16:18  --------------------------------------------------------  IN: 300 mL / OUT: 200 mL / NET: 100 mL        LABS:                        13.3   7.71  )-----------( 267      ( 30 Apr 2025 05:01 )             39.9     04-30  142  |  108  |  11  ----------------------------<  75  4.0   |  22  |  0.9    Ca    8.4      30 Apr 2025 05:01    TPro  6.3  /  Alb  3.4[L]  /  TBili  0.6  /  DBili  x   /  AST  67[H]  /  ALT  63[H]  /  AlkPhos  77  04-30        Urinalysis Basic - ( 30 Apr 2025 05:01 )  Color: x / Appearance: x / SG: x / pH: x  Gluc: 75 mg/dL / Ketone: x  / Bili: x / Urobili: x   Blood: x / Protein: x / Nitrite: x   Leuk Esterase: x / RBC: x / WBC x   Sq Epi: x / Non Sq Epi: x / Bacteria: x Urology Progress Note:     Pt is a 89 yo M pmhx HTN a/w AMS. Hx obtained from chart and other collateral sources of On 4/19/25, pt was found to be retaining urine in the ED (631cc) with pt unable to void so anaya catheter was placed by ED staff. Patient was admitted to Medicine for further evaluation of AMS, lethargy and rib fx that were likely sustained from a fall a couple of weeks prior per chart. During this time, pt had remained confused and pulled on his catheter causing him to become hematuric which quickly resolved.   On 4/21 patient was given a TOV and passed when US showed a PVR of 23cc.   On 4/23 Per chart, patient had again developed urinary retention (bedside bladder scan showed a high PVR per chart though exact amount unknown at this time) so decision was made by primary team to place anaya catheter. Patient again developed confusion and pulled on his catheter again causing him to become hematuric so patient had been placed on restraints.  On 4/24 patient was found to have an acute right sided PE and had been started on AC. Remains on a 1:1 sit.   On 4/27 RN called  team for assistance with catheter irrigation. Anaay catheter at this time draining clear dark yellow urine without any blood or blood clots noted to be draining within the anaya catheter tubing. Irrigated anaya catheter with 150cc of SW without issues and left anaya catheter draining clear yellow urine without any clots noted in tubing.  Today, Patient noted again with gross hematuria likely from traumatic anaya from patient pulling on restraints. Per RN, patient was irrigated with 150cc of SW with some clots aspirated,  recalled for further evaluation. Anaya in place draining thin blood-tinged urine without clots.       PAST MEDICAL & SURGICAL HISTORY:  Hypertension    [ x ]  Due to altered mental status/intubation, subjective information was not able to be obtained from the patient. History was obtained to the extent possible from review of the chart and collateral sources of information.     MEDICATIONS  (STANDING):  brimonidine 0.2% Ophthalmic Solution 1 Drop(s) Both EYES two times a day  chlorhexidine 2% Cloths 1 Application(s) Topical daily  divalproex  milliGRAM(s) Oral <User Schedule>  divalproex  milliGRAM(s) Oral <User Schedule>  enoxaparin Injectable 80 milliGRAM(s) SubCutaneous every 12 hours  latanoprost 0.005% Ophthalmic Solution 1 Drop(s) Both EYES at bedtime  lisinopril 10 milliGRAM(s) Oral daily  melatonin 10 milliGRAM(s) Oral at bedtime  metoprolol succinate ER 25 milliGRAM(s) Oral daily  multivitamin 1 Tablet(s) Oral daily  polyethylene glycol 3350 17 Gram(s) Oral two times a day  QUEtiapine 25 milliGRAM(s) Oral <User Schedule>  senna 2 Tablet(s) Oral at bedtime  tamsulosin 0.8 milliGRAM(s) Oral at bedtime  thiamine 200 milliGRAM(s) Oral daily  timolol 0.5% Solution 1 Drop(s) Both EYES two times a day    MEDICATIONS  (PRN):  acetaminophen     Tablet .. 650 milliGRAM(s) Oral every 6 hours PRN Temp greater or equal to 38C (100.4F), Mild Pain (1 - 3)  aluminum hydroxide/magnesium hydroxide/simethicone Suspension 30 milliLiter(s) Oral every 4 hours PRN Dyspepsia  ARIPiprazole 2.5 milliGRAM(s) Oral <User Schedule> PRN agitation      Allergies: No Known Allergies    SOCIAL HISTORY: No illicit drug use    PHYSICAL EXAM:  Constitutional: NAD, well-developed  Back: No CVA ttp bilaterally  Resp: No accessory respiratory muscle use  Abd: Soft, NT/ND. No suprapubic ttp  : Uncircumcised, testicles x2 nottp and symmetric. +anaya in place draining pink tinged to clear urine without clots after irrigating with 150cc SW.   Neuro: Awake and confused   Psych: Normal mood, normal affect  Skin: Good color, non diaphoretic    Patient consented verbally to a pelvic examination/anaya placement with CHERRY Jackman present as a chaperone.     Vital Signs Last 24 Hrs  T(C): 36.2 (30 Apr 2025 12:08), Max: 36.7 (30 Apr 2025 05:17)  T(F): 97.2 (30 Apr 2025 12:08), Max: 98 (30 Apr 2025 05:17)  HR: 63 (30 Apr 2025 12:08) (63 - 92)  BP: 127/80 (30 Apr 2025 12:08) (116/71 - 127/80)  BP(mean): 86 (30 Apr 2025 05:17) (86 - 86)  RR: 18 (30 Apr 2025 12:08) (18 - 18)  SpO2: 99% (30 Apr 2025 12:08) (99% - 99%)        I&O's Summary    29 Apr 2025 07:01  -  30 Apr 2025 07:00  --------------------------------------------------------  IN: 100 mL / OUT: 400 mL / NET: -300 mL    30 Apr 2025 07:01  -  30 Apr 2025 16:18  --------------------------------------------------------  IN: 300 mL / OUT: 200 mL / NET: 100 mL        LABS:                        13.3   7.71  )-----------( 267      ( 30 Apr 2025 05:01 )             39.9     04-30  142  |  108  |  11  ----------------------------<  75  4.0   |  22  |  0.9    Ca    8.4      30 Apr 2025 05:01    TPro  6.3  /  Alb  3.4[L]  /  TBili  0.6  /  DBili  x   /  AST  67[H]  /  ALT  63[H]  /  AlkPhos  77  04-30        Urinalysis Basic - ( 30 Apr 2025 05:01 )  Color: x / Appearance: x / SG: x / pH: x  Gluc: 75 mg/dL / Ketone: x  / Bili: x / Urobili: x   Blood: x / Protein: x / Nitrite: x   Leuk Esterase: x / RBC: x / WBC x   Sq Epi: x / Non Sq Epi: x / Bacteria: x

## 2025-04-30 NOTE — BH CONSULTATION LIAISON PROGRESS NOTE - NSBHTIMEACTIVITIESPERFORMED_PSY_A_CORE
Refill request received for Senna 8.6.  It was refused, and Misael was encouraged to route this to Dr. Rooney, not Dr. Puente.  
Time spent: reviewing labs, notes, chart documents, coordinating with treatment team and pt assessment
Time spent: reviewing labs, notes, chart documents, coordinating with treatment team and pt assessment

## 2025-04-30 NOTE — BH CONSULTATION LIAISON PROGRESS NOTE - NSBHCONSULTMEDAGITATION_PSY_A_CORE FT
aripiprazole 2.5 mg PO q6 for agitation   If he does not tolerate PO: lorazepam 0.5 mg q 8 IM OR versed 1 mg IM if ativan unavailable  If he does not tolerate PO: lorazepam 0.5 mg q 8 IM OR versed 1 mg IM if ativan unavailable
aripiprazole 2.5 mg PO q6 for agitation   If he does not tolerate PO: lorazepam 0.5 mg q 8 IM OR versed 1 mg IM if ativan unavailable  If he does not tolerate PO: lorazepam 0.5 mg q 8 IM OR versed 1 mg IM if ativan unavailable
aripiprazole 2.5 mg PO q8 for agitation   If he does not tolerate PO: lorazepam 0.5 mg q 8 IM OR versed 1 mg IM if ativan unavailable 
aripiprazole 2.5 mg PO q8 for agitation   If he does not tolerate PO: lorazepam 0.5 mg q 8 IM OR versed 1 mg IM if ativan unavailable 
aripiprazole 2.5 mg PO q6 for agitation   If he does not tolerate PO: lorazepam 0.5 mg q 8 IM OR versed 1 mg IM if ativan unavailable

## 2025-04-30 NOTE — PROGRESS NOTE ADULT - ASSESSMENT
89 yo M pmhx htn presenting to the ED for evaluation. pt son at bedside endorsing he has been more confused and speaking "gibberish". pt fell 2 weeks ago, diagnosed with 4 Right sided rib fractures outpatient. Pt also with urinary retention. Pt had anaya placed in the ER on admission and he removed it and it was replaced again and then he removed again. Now with some light hematuria.   Psychiatry consulted to assist with medication management for Dementia with Agitation vs Delirium as QTc > 500 and can not use usual antipsychotics. On 4/23 pt endorsed some chest pain so transferred to  tele, found to have acute PE.     # New onset HFrEF   - TTE: Global LV systolic function was moderately decreased. Left ventricular ejection fraction, by visual estimation, is 30 to 35%.   - start GDMT --> introduced losartan and metoprolol 4/27  - discontinued nifedipine and start ACEI and BB   - not a candidate for LifeVest or AICD due to age, dementia and poor functional status   - case discussed with cardio fellow. patient is not a candidate for ischemic work up. TSH = wnl, thiamine level ordered. Consider official cardio consult if any other questions.    # NSVT   - started BB  - keep K >4 and Mg > 2   - keep on tele     # Acute low risk PE  - CTA noted. PE, no right heart strain  - on RA  - Duplex negative  - c/w Lovenox therapeutic, will need to transition to DOAC on dc though will need good dispo as pt has hx of recurrent falls, ideally nursing home, would not be safe on DOAC at home     # Urinary retention started at home  # Gross Hematuria ( traumatic - patient pulled anaya) - resolved   - Possible causes of obstruction include BPH or constipation  - c/w Flomax   - US w no hydro or obstruction   - anaya replaced   - c/w mittens     # Worsening dementia   # Agitation   # H/o Multiple fall, deconditioning   # Multiple Anaya removal by pt w/ trauma and hematuria   - Per family - he lives upstairs and was independant with most ADLs (bathing, walking, eating). This decline represents a significant change from baseline  -- trauma w/up negative    -- Fall precautions   -- Psychiatry consultation appreciated - will follow med recs, started abilify and depakote, added seroquel 25 mg at bedtime on 04/29 -> time changed to 6 pm daily.  -- on bilateral mittens -> can d/c if patient is comfortable tomorrow.    #Transaminitis  - ? 2/2 medications  - monitor LFTs --> if trending up adjust Depakote dose and further w/up including RUQ sono    # Constipation  - C/w bowel regimen     # incidental CT scan findings:  - Focal area of subcortical white matter hypoattenuation in the left frontoparietal region with questionable associated cortical involvement.    Findings are nonspecific could represent chronic infarct, focal white matter changes including chronic microvascular ischemic changes, focal   gliosis, focal demyelination, If symptoms persist nonemergent outpatient brain MRI can be considered.  -Few ill-defined though nonaggressive appearing lucencies in the calvarium largest in the left frontal region.   - Few scattered lucencies throughout the vertebral column in conjunction with the above findings, nonspecific though can consider multiple myeloma  - if within goc as outpt     DVT prophylaxis Lovenox     DNR/DNI    Pending: SNF placement, improvement in agitation.

## 2025-04-30 NOTE — BH CONSULTATION LIAISON PROGRESS NOTE - NSBHATTESTAPPBILLTIME_PSY_A_CORE
I attest my time as MARY is greater than 50% of the total combined time spent on qualifying patient care activities. I have reviewed and verified the documentation.

## 2025-05-01 NOTE — PROGRESS NOTE ADULT - SUBJECTIVE AND OBJECTIVE BOX
EVENSTOÑO  89y  Male    Reason for Admission:   OVERNIGHT/INTERIM: Pt seen and examined at bedside during AM rounds.        Vital Signs Last 24 Hrs  T(C): 36.5 (01 May 2025 08:45), Max: 36.6 (30 Apr 2025 20:10)  T(F): 97.7 (01 May 2025 08:45), Max: 97.8 (30 Apr 2025 20:10)  HR: 63 (01 May 2025 08:45) (63 - 75)  BP: 119/69 (01 May 2025 08:45) (119/69 - 123/74)  BP(mean): 90 (01 May 2025 05:07) (90 - 90)  RR: 18 (01 May 2025 08:45) (18 - 18)  SpO2: 91% (01 May 2025 08:45) (91% - 98%)    Parameters below as of 01 May 2025 08:45  Patient On (Oxygen Delivery Method): room air        PHYSICAL EXAM:  GENERAL: NAD, well-groomed, well-developed  HEENT - NC/AT, pupils equal and reactive to light,   NECK: Supple  CHEST/LUNG: Clear to auscultation bilaterally; No rales, rhonchi, wheezing  HEART: Regular rate and rhythm; No murmurs, rubs, or gallops  ABDOMEN: Soft, Nontender, Nondistended; Bowel sounds present  EXTREMITIES:   No clubbing, cyanosis, or edema      LABS:                          13.3   7.71  )-----------( 267      ( 30 Apr 2025 05:01 )             39.9     04-30    142  |  108  |  11  ----------------------------<  75  4.0   |  22  |  0.9    Ca    8.4      30 Apr 2025 05:01    TPro  6.3  /  Alb  3.4[L]  /  TBili  0.6  /  DBili  x   /  AST  67[H]  /  ALT  63[H]  /  AlkPhos  77  04-30    LIVER FUNCTIONS - ( 30 Apr 2025 05:01 )  Alb: 3.4 g/dL / Pro: 6.3 g/dL / ALK PHOS: 77 U/L / ALT: 63 U/L / AST: 67 U/L / GGT: x             Urinalysis Basic - ( 30 Apr 2025 05:01 )    Color: x / Appearance: x / SG: x / pH: x  Gluc: 75 mg/dL / Ketone: x  / Bili: x / Urobili: x   Blood: x / Protein: x / Nitrite: x   Leuk Esterase: x / RBC: x / WBC x   Sq Epi: x / Non Sq Epi: x / Bacteria: x      MedsMEDICATIONS  (STANDING):  brimonidine 0.2% Ophthalmic Solution 1 Drop(s) Both EYES two times a day  chlorhexidine 2% Cloths 1 Application(s) Topical daily  divalproex  milliGRAM(s) Oral <User Schedule>  divalproex  milliGRAM(s) Oral <User Schedule>  latanoprost 0.005% Ophthalmic Solution 1 Drop(s) Both EYES at bedtime  lisinopril 10 milliGRAM(s) Oral daily  melatonin 10 milliGRAM(s) Oral at bedtime  metoprolol succinate ER 25 milliGRAM(s) Oral daily  multivitamin 1 Tablet(s) Oral daily  polyethylene glycol 3350 17 Gram(s) Oral two times a day  QUEtiapine 25 milliGRAM(s) Oral <User Schedule>  senna 2 Tablet(s) Oral at bedtime  tamsulosin 0.8 milliGRAM(s) Oral at bedtime  thiamine 200 milliGRAM(s) Oral daily  timolol 0.5% Solution 1 Drop(s) Both EYES two times a day    MEDICATIONS  (PRN):  acetaminophen     Tablet .. 650 milliGRAM(s) Oral every 6 hours PRN Temp greater or equal to 38C (100.4F), Mild Pain (1 - 3)  aluminum hydroxide/magnesium hydroxide/simethicone Suspension 30 milliLiter(s) Oral every 4 hours PRN Dyspepsia  ARIPiprazole 2.5 milliGRAM(s) Oral <User Schedule> PRN agitation

## 2025-05-01 NOTE — PROGRESS NOTE ADULT - SUBJECTIVE AND OBJECTIVE BOX
UROLOGY DAILY PROGRESS NOTE    Pt is a 89 y/o Male with gross hematuria from traumatic Flores. Pt sleeping quietly  in NAD, Flores draining bloody urine with few small clots. C/W old blood.    MEDICATIONS  (STANDING):  brimonidine 0.2% Ophthalmic Solution 1 Drop(s) Both EYES two times a day  chlorhexidine 2% Cloths 1 Application(s) Topical daily  divalproex  milliGRAM(s) Oral <User Schedule>  divalproex  milliGRAM(s) Oral <User Schedule>  latanoprost 0.005% Ophthalmic Solution 1 Drop(s) Both EYES at bedtime  lisinopril 10 milliGRAM(s) Oral daily  melatonin 10 milliGRAM(s) Oral at bedtime  metoprolol succinate ER 25 milliGRAM(s) Oral daily  multivitamin 1 Tablet(s) Oral daily  polyethylene glycol 3350 17 Gram(s) Oral two times a day  QUEtiapine 25 milliGRAM(s) Oral <User Schedule>  senna 2 Tablet(s) Oral at bedtime  tamsulosin 0.8 milliGRAM(s) Oral at bedtime  thiamine 200 milliGRAM(s) Oral daily  timolol 0.5% Solution 1 Drop(s) Both EYES two times a day    MEDICATIONS  (PRN):  acetaminophen     Tablet .. 650 milliGRAM(s) Oral every 6 hours PRN Temp greater or equal to 38C (100.4F), Mild Pain (1 - 3)  aluminum hydroxide/magnesium hydroxide/simethicone Suspension 30 milliLiter(s) Oral every 4 hours PRN Dyspepsia  ARIPiprazole 2.5 milliGRAM(s) Oral <User Schedule> PRN agitation      REVIEW OF SYSTEMS   [x] A ten-point review of systems was otherwise negative except as noted.    Vital Signs Last 24 Hrs  T(C): 36.5 (01 May 2025 08:45), Max: 36.6 (30 Apr 2025 20:10)  T(F): 97.7 (01 May 2025 08:45), Max: 97.8 (30 Apr 2025 20:10)  HR: 63 (01 May 2025 08:45) (63 - 75)  BP: 119/69 (01 May 2025 08:45) (119/69 - 127/80)  BP(mean): 90 (01 May 2025 05:07) (90 - 90)  RR: 18 (01 May 2025 08:45) (18 - 18)  SpO2: 91% (01 May 2025 08:45) (91% - 99%)    Parameters below as of 01 May 2025 08:45  Patient On (Oxygen Delivery Method): room air    PHYSICAL EXAM:    GEN: NAD, awake and alert.  SKIN: Good color, non diaphoretic.  RESP: Non-labored breathing. No use of accessory muscles.  ABDO: Soft, NT/ND, no palpable bladder, no suprapubic tenderness.  BACK: No CVAT B/L  : + Indwelling Flores in place, draining dark bloody urine.   EXT: BOLES x 4      I&O's Summary    30 Apr 2025 07:01  -  01 May 2025 07:00  --------------------------------------------------------  IN: 690 mL / OUT: 600 mL / NET: 90 mL    01 May 2025 07:01  -  01 May 2025 11:34  --------------------------------------------------------  IN: 0 mL / OUT: 300 mL / NET: -300 mL        LABS:                        13.3   7.71  )-----------( 267      ( 30 Apr 2025 05:01 )             39.9     04-30    142  |  108  |  11  ----------------------------<  75  4.0   |  22  |  0.9    Ca    8.4      30 Apr 2025 05:01    TPro  6.3  /  Alb  3.4[L]  /  TBili  0.6  /  DBili  x   /  AST  67[H]  /  ALT  63[H]  /  AlkPhos  77  04-30      Urinalysis Basic - ( 30 Apr 2025 05:01 )    Color: x / Appearance: x / SG: x / pH: x  Gluc: 75 mg/dL / Ketone: x  / Bili: x / Urobili: x   Blood: x / Protein: x / Nitrite: x   Leuk Esterase: x / RBC: x / WBC x   Sq Epi: x / Non Sq Epi: x / Bacteria: x

## 2025-05-01 NOTE — PROGRESS NOTE ADULT - NS PANP COMMENT GEN_ALL_CORE FT
I personally saw and examined the patient, reviewed the chart and available data. I discussed the situation with the patient care team. I also reviewed and/or amended the note as necessary.

## 2025-05-01 NOTE — PROGRESS NOTE ADULT - ASSESSMENT
89 y/o Male with gross hematuria from traumatic Flores    A) Hematuria  traumatic Flores    P) Irrigate Flores q 4 hrs and prn with 250 cc of sterile water  continue Flores catheter  consider Proscar 5 mg if not contra indicated  will follow  will d/w attending    89 y/o Male with gross hematuria from traumatic Flores    A) Hematuria  traumatic Flores    P) Irrigate Flores q 4 hrs and prn with 250 cc of sterile water  continue Flores catheter  consider Proscar 5 mg if not contra indicated  will follow  will d/w attending     ADDENDUM:  Irrigated Flores with 200 cc of sterile water till thin blood tinged urine. Prior to irrigation, Flores draining thin dark bloody urine without clots. 89 y/o Male with gross hematuria from traumatic Flores    A) Hematuria  traumatic Flores    P) Irrigate Flores q 4 hrs and prn with 250 cc of sterile water  continue Flores catheter  consider Proscar 5 mg if not contra indicated  will follow  will d/w attending     ADDENDUM:  Irrigated Flores with 200 cc of sterile water till thin blood tinged urine. Prior to irrigation, Flores draining thin dark bloody urine without clots.    see my addition to the consult

## 2025-05-01 NOTE — PROGRESS NOTE ADULT - ASSESSMENT
89 yo M pmhx htn presenting to the ED for evaluation. pt son at bedside endorsing he has been more confused and speaking "gibberish". pt fell 2 weeks ago, diagnosed with 4 Right sided rib fractures outpatient. Pt also with urinary retention. Pt had anaya placed in the ER on admission and he removed it and it was replaced again and then he removed again. Now with some light hematuria.   Psychiatry consulted to assist with medication management for Dementia with Agitation vs Delirium as QTc > 500 and can not use usual antipsychotics. On 4/23 pt endorsed some chest pain so transferred to  tele, found to have acute PE.     # New onset HFrEF   - TTE: Global LV systolic function was moderately decreased. Left ventricular ejection fraction, by visual estimation, is 30 to 35%.   - start GDMT --> introduced losartan and metoprolol 4/27  - discontinued nifedipine and start ACEI and BB   - not a candidate for LifeVest or AICD due to age, dementia and poor functional status   - case discussed with cardio fellow. patient is not a candidate for ischemic work up. TSH = wnl, thiamine level ordered. Consider official cardio consult if any other questions.    # NSVT   - started BB  - keep K >4 and Mg > 2   - keep on tele     # Acute low risk PE  - CTA noted. PE, no right heart strain  - on RA  - Duplex negative  - Was previously on therapeutic Lovenox, dc'ed 5/1 AM in context of hematuria    # Urinary retention started at home  # Gross Hematuria ( traumatic - patient pulled anaya)  - Possible causes of obstruction include BPH or constipation  - c/w Flomax   - US w no hydro or obstruction   - anaya replaced   - c/w mittens   - Uro following    # Worsening dementia   # Agitation   # H/o Multiple fall, deconditioning   # Multiple Anaya removal by pt w/ trauma and hematuria   - Per family - he lives upstairs and was independant with most ADLs (bathing, walking, eating). This decline represents a significant change from baseline  -- trauma w/up negative    -- Fall precautions   -- Psychiatry consultation appreciated - will follow med recs, started abilify and depakote, added seroquel 25 mg at bedtime on 04/29 -> time changed to 6 pm daily.  -- on bilateral mittens -> can d/c if patient is comfortable tomorrow.    #Transaminitis  - ? 2/2 medications  - monitor LFTs --> if trending up adjust Depakote dose and further w/up including RUQ sono    # Constipation  - C/w bowel regimen     # incidental CT scan findings:  - Focal area of subcortical white matter hypoattenuation in the left frontoparietal region with questionable associated cortical involvement.    Findings are nonspecific could represent chronic infarct, focal white matter changes including chronic microvascular ischemic changes, focal   gliosis, focal demyelination, If symptoms persist nonemergent outpatient brain MRI can be considered.  -Few ill-defined though nonaggressive appearing lucencies in the calvarium largest in the left frontal region.   - Few scattered lucencies throughout the vertebral column in conjunction with the above findings, nonspecific though can consider multiple myeloma  - if within goc as outpt     DVT prophylaxis Lovenox     DNR/DNI    Pending: SNF placement, improvement in agitation, hematuria resolving

## 2025-05-02 NOTE — PROGRESS NOTE ADULT - SUBJECTIVE AND OBJECTIVE BOX
UROLOGY PROGRESS NOTE    Pt is a 89y M that urology is following for hematuria. Pt seen at bedside today AM- anaya draining yellow urine.     MEDICATIONS  (STANDING):  brimonidine 0.2% Ophthalmic Solution 1 Drop(s) Both EYES two times a day  chlorhexidine 2% Cloths 1 Application(s) Topical daily  divalproex  milliGRAM(s) Oral <User Schedule>  divalproex  milliGRAM(s) Oral <User Schedule>  latanoprost 0.005% Ophthalmic Solution 1 Drop(s) Both EYES at bedtime  lisinopril 10 milliGRAM(s) Oral daily  melatonin 10 milliGRAM(s) Oral at bedtime  metoprolol succinate ER 25 milliGRAM(s) Oral daily  multivitamin 1 Tablet(s) Oral daily  polyethylene glycol 3350 17 Gram(s) Oral two times a day  QUEtiapine 25 milliGRAM(s) Oral <User Schedule>  senna 2 Tablet(s) Oral at bedtime  tamsulosin 0.8 milliGRAM(s) Oral at bedtime  thiamine 200 milliGRAM(s) Oral daily  timolol 0.5% Solution 1 Drop(s) Both EYES two times a day    MEDICATIONS  (PRN):  acetaminophen     Tablet .. 650 milliGRAM(s) Oral every 6 hours PRN Temp greater or equal to 38C (100.4F), Mild Pain (1 - 3)  aluminum hydroxide/magnesium hydroxide/simethicone Suspension 30 milliLiter(s) Oral every 4 hours PRN Dyspepsia  ARIPiprazole 2.5 milliGRAM(s) Oral <User Schedule> PRN agitation      REVIEW OF SYSTEMS   [ ] A ten-point review of systems was otherwise negative except as noted.  [ ] Due to altered mental status/intubation, subjective information were not able to be obtained from patient. History was obtained, to the extent possible, from review of the chart and collateral sources of information.    Vital Signs Last 24 Hrs  T(C): 36.4 (02 May 2025 04:30), Max: 36.4 (01 May 2025 13:20)  T(F): 97.5 (02 May 2025 04:30), Max: 97.6 (01 May 2025 13:20)  HR: 76 (02 May 2025 04:30) (70 - 76)  BP: 127/72 (02 May 2025 04:30) (109/64 - 134/79)  BP(mean): 90 (02 May 2025 04:30) (90 - 90)  RR: 18 (02 May 2025 04:30) (18 - 18)  SpO2: 95% (02 May 2025 04:30) (94% - 95%)    Parameters below as of 02 May 2025 04:30  Patient On (Oxygen Delivery Method): room air      PHYSICAL EXAM:  GEN: NAD  SKIN: Good color, non diaphoretic  RESP: Non-labored breathing  ABDO: Soft, NT  : +anaya in place noted with yellow urine in tubing       I&O's Summary    01 May 2025 07:01  -  02 May 2025 07:00  --------------------------------------------------------  IN: 630 mL / OUT: 1200 mL / NET: -570 mL        LABS:                        13.9   6.04  )-----------( 245      ( 02 May 2025 05:46 )             40.9     05-02    143  |  109  |  9[L]  ----------------------------<  74  3.7   |  20  |  0.9    Ca    8.6      02 May 2025 05:46    TPro  6.0  /  Alb  3.0[L]  /  TBili  0.6  /  DBili  x   /  AST  43[H]  /  ALT  44[H]  /  AlkPhos  77  05-02

## 2025-05-02 NOTE — PROGRESS NOTE ADULT - SUBJECTIVE AND OBJECTIVE BOX
[place]O  89y  Male    Reason for Admission:   OVERNIGHT/INTERIM: Pt seen and examined at bedside during AM rounds.    No acute or major events    Vital Signs Last 24 Hrs  T(C): 36.4 (02 May 2025 04:30), Max: 36.5 (01 May 2025 08:45)  T(F): 97.5 (02 May 2025 04:30), Max: 97.7 (01 May 2025 08:45)  HR: 76 (02 May 2025 04:30) (63 - 76)  BP: 127/72 (02 May 2025 04:30) (109/64 - 134/79)  BP(mean): 90 (02 May 2025 04:30) (90 - 90)  RR: 18 (02 May 2025 04:30) (18 - 18)  SpO2: 95% (02 May 2025 04:30) (91% - 95%)    Parameters below as of 02 May 2025 04:30  Patient On (Oxygen Delivery Method): room air        PHYSICAL EXAM:  GENERAL: NAD, well-groomed, well-developed  HEENT - NC/AT, pupils equal and reactive to light,   NECK: Supple  CHEST/LUNG: Clear to auscultation bilaterally; No rales, rhonchi, wheezing  HEART: Regular rate and rhythm; No murmurs, rubs, or gallops  ABDOMEN: Soft, Nontender, Nondistended; Bowel sounds present  EXTREMITIES:   No clubbing, cyanosis, or edema  SKIN: Rash noted on back      LABS:                              13.9   6.04  )-----------( 245      ( 02 May 2025 05:46 )             40.9     05-01    140  |  107  |  10  ----------------------------<  79  3.6   |  22  |  0.8    Ca    8.4      01 May 2025 11:17    TPro  6.2  /  Alb  3.3[L]  /  TBili  0.7  /  DBili  x   /  AST  45[H]  /  ALT  48[H]  /  AlkPhos  77  05-01    LIVER FUNCTIONS - ( 01 May 2025 11:17 )  Alb: 3.3 g/dL / Pro: 6.2 g/dL / ALK PHOS: 77 U/L / ALT: 48 U/L / AST: 45 U/L / GGT: x             Urinalysis Basic - ( 01 May 2025 11:17 )    Color: x / Appearance: x / SG: x / pH: x  Gluc: 79 mg/dL / Ketone: x  / Bili: x / Urobili: x   Blood: x / Protein: x / Nitrite: x   Leuk Esterase: x / RBC: x / WBC x   Sq Epi: x / Non Sq Epi: x / Bacteria: x            MedsMEDICATIONS  (STANDING):  brimonidine 0.2% Ophthalmic Solution 1 Drop(s) Both EYES two times a day  chlorhexidine 2% Cloths 1 Application(s) Topical daily  divalproex  milliGRAM(s) Oral <User Schedule>  divalproex  milliGRAM(s) Oral <User Schedule>  latanoprost 0.005% Ophthalmic Solution 1 Drop(s) Both EYES at bedtime  lisinopril 10 milliGRAM(s) Oral daily  melatonin 10 milliGRAM(s) Oral at bedtime  metoprolol succinate ER 25 milliGRAM(s) Oral daily  multivitamin 1 Tablet(s) Oral daily  polyethylene glycol 3350 17 Gram(s) Oral two times a day  QUEtiapine 25 milliGRAM(s) Oral <User Schedule>  senna 2 Tablet(s) Oral at bedtime  tamsulosin 0.8 milliGRAM(s) Oral at bedtime  thiamine 200 milliGRAM(s) Oral daily  timolol 0.5% Solution 1 Drop(s) Both EYES two times a day    MEDICATIONS  (PRN):  acetaminophen     Tablet .. 650 milliGRAM(s) Oral every 6 hours PRN Temp greater or equal to 38C (100.4F), Mild Pain (1 - 3)  aluminum hydroxide/magnesium hydroxide/simethicone Suspension 30 milliLiter(s) Oral every 4 hours PRN Dyspepsia  ARIPiprazole 2.5 milliGRAM(s) Oral <User Schedule> PRN agitation

## 2025-05-02 NOTE — PROGRESS NOTE ADULT - ASSESSMENT
Pt is a 89y M that urology is following for hematuria, clearing in tubing       ·	Cont Flomax  ·	Covering medicine attending at bedside, can restart AC as per primary team   ·	Cont anaya catheter care   ·	Will d/w attng  Pt is a 89y M that urology is following for hematuria, clearing in tubing       ·	Cont Flomax  ·	Covering medicine attending at bedside, can restart AC as per primary team   ·	Cont anaya catheter care   ·	Recommend TOV when patient ambulating   ·	Recall  prn   ·	Will d/w attng

## 2025-05-02 NOTE — PROGRESS NOTE ADULT - ASSESSMENT
89 yo M pmhx htn presenting to the ED for evaluation. pt son at bedside endorsing he has been more confused and speaking "gibberish". pt fell 2 weeks ago, diagnosed with 4 Right sided rib fractures outpatient. Pt also with urinary retention. Pt had anaya placed in the ER on admission and he removed it and it was replaced again and then he removed again. Now with some light hematuria.   Psychiatry consulted to assist with medication management for Dementia with Agitation vs Delirium as QTc > 500 and can not use usual antipsychotics. On 4/23 pt endorsed some chest pain so transferred to 3c tele, found to have acute PE.     # New onset HFrEF   - TTE: Global LV systolic function was moderately decreased. Left ventricular ejection fraction, by visual estimation, is 30 to 35%.   - start GDMT --> introduced losartan and metoprolol 4/27  - discontinued nifedipine and start ACEI and BB   - not a candidate for LifeVest or AICD due to age, dementia and poor functional status   - case discussed with cardio fellow. patient is not a candidate for ischemic work up. TSH = wnl, thiamine level ordered. Consider official cardio consult if any other questions.    # NSVT   - started BB  - keep K >4 and Mg > 2   - keep on tele     # Acute low risk PE  - CTA noted. PE, no right heart strain  - on RA  - Duplex negative  - Was previously on therapeutic Lovenox, dc'ed 5/1 AM in context of hematuria. Discussed with family risks and benefits, agree on decision 5/1 PM    # Urinary retention started at home  # Gross Hematuria ( traumatic - patient pulled anaya)  - Possible causes of obstruction include BPH or constipation  - c/w Flomax   - US w no hydro or obstruction   - anaya replaced   - c/w mittens   - Uro following    # Worsening dementia   # Agitation   # H/o Multiple fall, deconditioning   # Multiple Anaya removal by pt w/ trauma and hematuria   - Per family - he lives upstairs and was independant with most ADLs (bathing, walking, eating). This decline represents a significant change from baseline  -- trauma w/up negative    -- Fall precautions   -- Psychiatry consultation appreciated - will follow med recs, started abilify and depakote, added seroquel 25 mg at bedtime on 04/29 -> time changed to 6 pm daily.  -- on bilateral mittens -> can d/c if patient is comfortable tomorrow.    #Transaminitis  - ? 2/2 medications  - monitor LFTs --> if trending up adjust Depakote dose and further w/up including RUQ sono    # Constipation  - C/w bowel regimen     # incidental CT scan findings:  - Focal area of subcortical white matter hypoattenuation in the left frontoparietal region with questionable associated cortical involvement.    Findings are nonspecific could represent chronic infarct, focal white matter changes including chronic microvascular ischemic changes, focal   gliosis, focal demyelination, If symptoms persist nonemergent outpatient brain MRI can be considered.  -Few ill-defined though nonaggressive appearing lucencies in the calvarium largest in the left frontal region.   - Few scattered lucencies throughout the vertebral column in conjunction with the above findings, nonspecific though can consider multiple myeloma  - if within goc as outpt     DVT prophylaxis Lovenox     DNR/DNI    Case discussed extensively with family at bedside on 5/1/25.     Pending(s): SNF placement, improvement in agitation, hematuria resolving

## 2025-05-03 NOTE — PROGRESS NOTE ADULT - ASSESSMENT
89 yo M pmhx htn presenting to the ED for evaluation. pt son at bedside endorsing he has been more confused and speaking "gibberish". pt fell 2 weeks ago, diagnosed with 4 Right sided rib fractures outpatient. Pt also with urinary retention. Pt had anaya placed in the ER on admission and he removed it and it was replaced again and then he removed again. Now with some light hematuria.   Psychiatry consulted to assist with medication management for Dementia with Agitation vs Delirium as QTc > 500 and can not use usual antipsychotics. On 4/23 pt endorsed some chest pain so transferred to 3c tele, found to have acute PE.     # New onset HFrEF   - TTE: Global LV systolic function was moderately decreased. Left ventricular ejection fraction, by visual estimation, is 30 to 35%.   - start GDMT --> introduced losartan and metoprolol 4/27  - discontinued nifedipine and start ACEI and BB   - not a candidate for LifeVest or AICD due to age, dementia and poor functional status   - case discussed with cardio fellow. patient is not a candidate for ischemic work up. TSH = wnl, thiamine level ordered. Consider official cardio consult if any other questions.  - CXR was reviewed, will consider diuretics if develops volume overload     # NSVT   - started BB  - keep K >4 and Mg > 2   - keep on tele     # Acute low risk PE  - CTA noted. PE, no right heart strain  - on RA  - Duplex negative  - Was previously on therapeutic Lovenox, dc'ed 5/1 AM in context of hematuria. Discussed with family risks and benefits, agree on decision 5/1 PM    # Urinary retention started at home  # Gross Hematuria ( traumatic - patient pulled anaya)  - Possible causes of obstruction include BPH or constipation  - c/w Flomax   - US w no hydro or obstruction   - Anaya replaced   - c/w mittens   - Uro following    # Worsening dementia   # Agitation   # H/o Multiple fall, deconditioning   # Multiple Anaya removal by pt w/ trauma and hematuria   - Per family - he lives upstairs and was independent with most ADLs (bathing, walking, eating). This decline represents a significant change from baseline  -- trauma w/up negative    -- Fall precautions   -- Psychiatry consultation appreciated - will follow med recs, started abilify and depakote, added seroquel 25 mg at bedtime on 04/29 -> time changed to 6 pm daily.  -- on bilateral mittens -> can d/c if patient is comfortable tomorrow.    #elevated liver enzymes likely DILI vs congestion     # Constipation  - C/w bowel regimen     # incidental CT scan findings:  - Focal area of subcortical white matter hypoattenuation in the left frontoparietal region with questionable associated cortical involvement.    Findings are nonspecific could represent chronic infarct, focal white matter changes including chronic microvascular ischemic changes, focal   gliosis, focal demyelination, If symptoms persist nonemergent outpatient brain MRI can be considered.  -Few ill-defined though nonaggressive appearing lucencies in the calvarium largest in the left frontal region.   - Few scattered lucencies throughout the vertebral column in conjunction with the above findings, nonspecific though can consider multiple myeloma  - if within goc as outpt     DVT prophylaxis Lovenox     DNR/DNI    Pending(s): SNF placement, improvement in agitation, hematuria resolving     89 yo M pmhx htn presenting to the ED for evaluation. pt son at bedside endorsing he has been more confused and speaking "gibberish". pt fell 2 weeks ago, diagnosed with 4 Right sided rib fractures outpatient. Pt also with urinary retention. Pt had anaya placed in the ER on admission and he removed it and it was replaced again and then he removed again. Now with some light hematuria.   Psychiatry consulted to assist with medication management for Dementia with Agitation vs Delirium as QTc > 500 and can not use usual antipsychotics. On 4/23 pt endorsed some chest pain so transferred to  tele, found to have acute PE.     # New onset HFrEF   New hypoxic respiratory failure   - TTE: Global LV systolic function was moderately decreased. Left ventricular ejection fraction, by visual estimation, is 30 to 35%.   - start GDMT --> introduced losartan and metoprolol 4/27  - discontinued nifedipine and start ACEI and BB   - not a candidate for LifeVest or AICD due to age, dementia and poor functional status   - case discussed with cardio fellow. patient is not a candidate for ischemic work up. TSH = wnl, thiamine level ordered. Consider official cardio consult if any other questions.  - CXR was reviewed, will give one dose of lasix today, reassess in am, repeat cxr tomorrow     # NSVT   - started BB  - keep K >4 and Mg > 2   - keep on tele     # Acute low risk PE  - CTA noted. PE, no right heart strain  - on RA  - Duplex negative  - Was previously on therapeutic Lovenox, dc'ed 5/1 AM in context of hematuria. Discussed with family risks and benefits, agree on decision 5/1 PM    # Urinary retention started at home  # Gross Hematuria ( traumatic - patient pulled anaya)  - Possible causes of obstruction include BPH or constipation  - c/w Flomax   - US w no hydro or obstruction   - Anaya replaced   - c/w mittens   - Uro following    # Worsening dementia   # Agitation   # H/o Multiple fall, deconditioning   # Multiple Anaya removal by pt w/ trauma and hematuria   - Per family - he lives upstairs and was independent with most ADLs (bathing, walking, eating). This decline represents a significant change from baseline  -- trauma w/up negative    -- Fall precautions   -- Psychiatry consultation appreciated - will follow med recs, started abilify and depakote, added seroquel 25 mg at bedtime on 04/29 -> time changed to 6 pm daily.  -- on bilateral mittens -> can d/c if patient is comfortable tomorrow.    #elevated liver enzymes likely DILI vs congestion     # Constipation  - C/w bowel regimen     # incidental CT scan findings:  - Focal area of subcortical white matter hypoattenuation in the left frontoparietal region with questionable associated cortical involvement.    Findings are nonspecific could represent chronic infarct, focal white matter changes including chronic microvascular ischemic changes, focal   gliosis, focal demyelination, If symptoms persist nonemergent outpatient brain MRI can be considered.  -Few ill-defined though nonaggressive appearing lucencies in the calvarium largest in the left frontal region.   - Few scattered lucencies throughout the vertebral column in conjunction with the above findings, nonspecific though can consider multiple myeloma  - if within goc as outpt     DVT prophylaxis Lovenox     DNR/DNI    Pending(s): SNF placement, improvement in agitation, hematuria resolving

## 2025-05-03 NOTE — CHART NOTE - NSCHARTNOTEFT_GEN_A_CORE
Called by covering RN to assess anaya catheter.    Pt seen at bedside with Dr. Marquis Anaya in place draining yellow urine   Catheter irrigated with 60cc SW   Can d/c q4-6hr irrigations as patient draining yellow urine  s/w covering RN Called by covering RN to assess anaya catheter.    Pt seen at bedside with Dr. Marquis Anaya in place draining yellow urine   Catheter irrigated with 60cc SW   Can d/c q4-6hr irrigations as patient draining yellow urine  s/w covering RN    Attending attestation:  I personally saw and evaluated this patient. Agree with PA assessment and plan. Urine clear yellow and Anaya catheter flushed well. No acute  intervention. Remainder of care per primary.  signing off please call with questions or concerns.     ! spent 40 minutes on the total care of this patient.

## 2025-05-03 NOTE — CHART NOTE - NSCHARTNOTEFT_GEN_A_CORE
Patient was noted to be hypoxic on routine vitals at 20:00.  ABG revealed alkalosis with hypoxia, CXRAY was unchanged from the AM. Decision was made to place patient on BIPAP as he is DNR/DNI. Spoke with son Gurjit who confirmed that he would like to keep his father DNR/DNI.

## 2025-05-03 NOTE — PROGRESS NOTE ADULT - SUBJECTIVE AND OBJECTIVE BOX
TOÑO ARREGUINCLARADEEDEE 89y Male  MRN#: [id number]       SUBJECTIVE  Patient is a 89y old Male who presents with a chief complaint of hematuria (02 May 2025 10:00)  Currently admitted to medicine with the primary diagnosis of Confusion  No overnight events       OBJECTIVE  PAST MEDICAL & SURGICAL HISTORY  Hypertension      ALLERGIES:  No Known Allergies    MEDICATIONS:  STANDING MEDICATIONS  brimonidine 0.2% Ophthalmic Solution 1 Drop(s) Both EYES two times a day  chlorhexidine 2% Cloths 1 Application(s) Topical daily  divalproex  milliGRAM(s) Oral <User Schedule>  divalproex  milliGRAM(s) Oral <User Schedule>  enoxaparin Injectable 80 milliGRAM(s) SubCutaneous every 12 hours  latanoprost 0.005% Ophthalmic Solution 1 Drop(s) Both EYES at bedtime  lisinopril 10 milliGRAM(s) Oral daily  melatonin 10 milliGRAM(s) Oral at bedtime  metoprolol succinate ER 25 milliGRAM(s) Oral daily  multivitamin 1 Tablet(s) Oral daily  polyethylene glycol 3350 17 Gram(s) Oral two times a day  QUEtiapine 25 milliGRAM(s) Oral <User Schedule>  senna 2 Tablet(s) Oral at bedtime  tamsulosin 0.8 milliGRAM(s) Oral at bedtime  thiamine 200 milliGRAM(s) Oral daily  timolol 0.5% Solution 1 Drop(s) Both EYES two times a day    PRN MEDICATIONS  acetaminophen     Tablet .. 650 milliGRAM(s) Oral every 6 hours PRN  aluminum hydroxide/magnesium hydroxide/simethicone Suspension 30 milliLiter(s) Oral every 4 hours PRN  ARIPiprazole 2.5 milliGRAM(s) Oral <User Schedule> PRN      VITAL SIGNS: Last 24 Hours  T(C): 36.7 (03 May 2025 04:30), Max: 36.7 (03 May 2025 04:30)  T(F): 98 (03 May 2025 04:30), Max: 98 (03 May 2025 04:30)  HR: 80 (03 May 2025 04:30) (60 - 84)  BP: 124/78 (03 May 2025 04:30) (113/72 - 132/73)  BP(mean): 98 (03 May 2025 04:30) (86 - 98)  RR: 20 (03 May 2025 04:30) (18 - 20)  SpO2: 93% (03 May 2025 04:30) (92% - 95%)    LABS:                        13.9   7.85  )-----------( 247      ( 03 May 2025 07:34 )             41.0     05-03    140  |  106  |  10  ----------------------------<  84  3.9   |  21  |  0.9    Ca    8.7      03 May 2025 07:34    TPro  6.1  /  Alb  3.4[L]  /  TBili  0.8  /  DBili  x   /  AST  40  /  ALT  39  /  AlkPhos  75  05-03      Urinalysis Basic - ( 03 May 2025 07:34 )    Color: x / Appearance: x / SG: x / pH: x  Gluc: 84 mg/dL / Ketone: x  / Bili: x / Urobili: x   Blood: x / Protein: x / Nitrite: x   Leuk Esterase: x / RBC: x / WBC x   Sq Epi: x / Non Sq Epi: x / Bacteria: x                RADIOLOGY:      PHYSICAL EXAM:  GENERAL: not in distress  HEENT - NC/AT, pupils equal and reactive to light,   NECK: Supple  CHEST/LUNG: no distress  HEART: Regular rate and rhythm  ABDOMEN: Soft, Nontender, Nondistended  EXTREMITIES:   No clubbing, cyanosis, or edema  SKIN: Rash noted on back

## 2025-05-04 NOTE — PROGRESS NOTE ADULT - SUBJECTIVE AND OBJECTIVE BOX
SUBJECTIVE:    Patient is a 89y old Male who presents with a chief complaint of hematuria (03 May 2025 12:02)    Currently admitted to medicine with the primary diagnosis of Confusion       Today is hospital day 15d.     PAST MEDICAL & SURGICAL HISTORY  Hypertension      ALLERGIES:  No Known Allergies    MEDICATIONS:  STANDING MEDICATIONS  brimonidine 0.2% Ophthalmic Solution 1 Drop(s) Both EYES two times a day  chlorhexidine 2% Cloths 1 Application(s) Topical daily  divalproex  milliGRAM(s) Oral <User Schedule>  divalproex  milliGRAM(s) Oral <User Schedule>  enoxaparin Injectable 80 milliGRAM(s) SubCutaneous every 12 hours  latanoprost 0.005% Ophthalmic Solution 1 Drop(s) Both EYES at bedtime  lisinopril 10 milliGRAM(s) Oral daily  melatonin 10 milliGRAM(s) Oral at bedtime  metoprolol succinate ER 25 milliGRAM(s) Oral daily  multivitamin 1 Tablet(s) Oral daily  polyethylene glycol 3350 17 Gram(s) Oral two times a day  QUEtiapine 25 milliGRAM(s) Oral <User Schedule>  senna 2 Tablet(s) Oral at bedtime  tamsulosin 0.8 milliGRAM(s) Oral at bedtime  thiamine 200 milliGRAM(s) Oral daily  timolol 0.5% Solution 1 Drop(s) Both EYES two times a day    PRN MEDICATIONS  acetaminophen     Tablet .. 650 milliGRAM(s) Oral every 6 hours PRN  aluminum hydroxide/magnesium hydroxide/simethicone Suspension 30 milliLiter(s) Oral every 4 hours PRN  ARIPiprazole 2.5 milliGRAM(s) Oral <User Schedule> PRN    VITALS:   T(F): 97.5  HR: 102  BP: 100/67  RR: 18  SpO2: 100%    LABS:                        14.4   11.46 )-----------( 302      ( 04 May 2025 04:30 )             41.8     05-04    142  |  105  |  17  ----------------------------<  87  3.9   |  21  |  1.2    Ca    8.9      04 May 2025 04:30    TPro  6.4  /  Alb  3.4[L]  /  TBili  1.1  /  DBili  x   /  AST  31  /  ALT  34  /  AlkPhos  86  05-04      Urinalysis Basic - ( 04 May 2025 04:30 )    Color: x / Appearance: x / SG: x / pH: x  Gluc: 87 mg/dL / Ketone: x  / Bili: x / Urobili: x   Blood: x / Protein: x / Nitrite: x   Leuk Esterase: x / RBC: x / WBC x   Sq Epi: x / Non Sq Epi: x / Bacteria: x      ABG - ( 03 May 2025 23:18 )  pH, Arterial: 7.54  pH, Blood: x     /  pCO2: 25    /  pO2: 42    / HCO3: 21    / Base Excess: 0.3   /  SaO2: 77.2                      RADIOLOGY:    PHYSICAL EXAM:  GEN: No acute distress  LUNGS: Clear to auscultation bilaterally   HEART: S1/S2 present. RRR.   ABD/ GI: Soft, non-tender, non-distended. Bowel sounds present  EXT: NC/NC/NE/2+PP/BOLES  NEURO: confused-- hard of hearing

## 2025-05-04 NOTE — PROGRESS NOTE ADULT - ASSESSMENT
89 yo M pmhx htn presenting to the ED for evaluation. pt son at bedside endorsing he has been more confused and speaking "gibberish". pt fell 2 weeks ago, diagnosed with 4 Right sided rib fractures outpatient. Pt also with urinary retention. Pt had anaya placed in the ER on admission and he removed it and it was replaced again and then he removed again. Now with some light hematuria.   Psychiatry consulted to assist with medication management for Dementia with Agitation vs Delirium as QTc > 500 and can not use usual antipsychotics. On 4/23 pt endorsed some chest pain so transferred to  tele, found to have acute PE.     # New onset HFrEF   New hypoxic respiratory failure   - TTE: Global LV systolic function was moderately decreased. Left ventricular ejection fraction, by visual estimation, is 30 to 35%.   - start GDMT --> introduced losartan and metoprolol 4/27  - discontinued nifedipine and start ACEI and BB   - not a candidate for LifeVest or AICD due to age, dementia and poor functional status   - case discussed with cardio fellow. patient is not a candidate for ischemic work up. TSH = wnl, thiamine level ordered. Consider official cardio consult if any other questions.  - CXR  stable -- got one dose of lasix 5/3--  is overdiuresed -- off lasix  # NSVT   - started BB  - keep K >4 and Mg > 2   - keep on tele     # Acute PE  - CTA noted. PE, no right heart strain  - on RA  - Duplex negative  - Was previously on therapeutic Lovenox, NH'ed 5/1 AM in context of hematuria. restarted on lovenox-- can change to eliquis AM    # Urinary retention started at home  # Gross Hematuria ( traumatic - patient pulled anaya)  - Possible causes of obstruction include BPH or constipation  - c/w Flomax   - US w no hydro or obstruction   - Anaya is removed and mittens removed-- bladder scan    # Worsening dementia   # Agitation   # H/o Multiple fall, deconditioning   # Multiple Anaya removal by pt w/ trauma and hematuria   - Per family - he lives upstairs and was independent with most ADLs (bathing, walking, eating). This decline represents a significant change from baseline  -- trauma w/up negative    -- Fall precautions   -- Psychiatry consultation appreciated - will follow med recs, started abilify and depakote, added seroquel 25 mg at bedtime on 04/29 -> time changed to 6 pm daily.  -- on bilateral mittens -> can d/c if patient is comfortable tomorrow.    #elevated liver enzymes likely DILI vs congestion     # Constipation  - C/w bowel regimen     # incidental CT scan findings:  - Focal area of subcortical white matter hypoattenuation in the left frontoparietal region with questionable associated cortical involvement.    Findings are nonspecific could represent chronic infarct, focal white matter changes including chronic microvascular ischemic changes, focal   gliosis, focal demyelination, If symptoms persist nonemergent outpatient brain MRI can be considered.  -Few ill-defined though nonaggressive appearing lucencies in the calvarium largest in the left frontal region.   - Few scattered lucencies throughout the vertebral column in conjunction with the above findings, nonspecific though can consider multiple myeloma  - if within goc as outpt     DVT prophylaxis Lovenox     DNR/DNI    Pending(s): SNF placement, improvement in agitation, hematuria resolving-- will go to SNF-- still on constant observation

## 2025-05-05 NOTE — CHART NOTE - NSCHARTNOTEFT_GEN_A_CORE
Registered Dietitian Follow-Up     Patient Profile Reviewed                           Yes [x]   No []     Nutrition History Previously Obtained        Yes [x]  No []       Pertinent Subjective Information: bedscale 67.7 kg ; covering sitter unsure of PO intake. 26-50% noted in flowsheet.      Pertinent Medical Interventions:   #New onset HFrEF (April 2025 TTE EF 30-35%)  #New hypoxic respiratory failure  #Worsening dementia   #Agitation   #H/o Multiple fall, deconditioning   #Multiple Flores removal by pt w/ trauma and hematuria   #Constipation       Diet order: Diet, DASH/TLC:   Sodium & Cholesterol Restricted  Supplement Feeding Modality:  Oral  Ensure Plus High Protein Cans or Servings Per Day:  1       Frequency:  Two Times a day (04-29-25 @ 15:54)  Diet, DASH/TLC:   Sodium & Cholesterol Restricted (04-19-25 @ 05:38)      Anthropometrics:   Height (cm): 167.6 (04-29-25)  Weight (kg): 67.8 kg 4/29  BMI (kg/m2): 24.3 (04-29-25)  IBW: 64.5 kg  UBW: 80.5 kg     Daily  67.7 5/5 67.8 4/29  0% Weight Change    MEDICATIONS  (STANDING):  brimonidine 0.2% Ophthalmic Solution 1 Drop(s) Both EYES two times a day  chlorhexidine 2% Cloths 1 Application(s) Topical daily  divalproex  milliGRAM(s) Oral <User Schedule>  divalproex  milliGRAM(s) Oral <User Schedule>  enoxaparin Injectable 80 milliGRAM(s) SubCutaneous every 12 hours  latanoprost 0.005% Ophthalmic Solution 1 Drop(s) Both EYES at bedtime  lisinopril 10 milliGRAM(s) Oral daily  melatonin 10 milliGRAM(s) Oral at bedtime  metoprolol succinate ER 25 milliGRAM(s) Oral daily  multivitamin 1 Tablet(s) Oral daily  polyethylene glycol 3350 17 Gram(s) Oral two times a day  QUEtiapine 25 milliGRAM(s) Oral <User Schedule>  senna 2 Tablet(s) Oral at bedtime  tamsulosin 0.8 milliGRAM(s) Oral at bedtime  thiamine 200 milliGRAM(s) Oral daily  timolol 0.5% Solution 1 Drop(s) Both EYES two times a day    MEDICATIONS  (PRN):  acetaminophen     Tablet .. 650 milliGRAM(s) Oral every 6 hours PRN Temp greater or equal to 38C (100.4F), Mild Pain (1 - 3)  aluminum hydroxide/magnesium hydroxide/simethicone Suspension 30 milliLiter(s) Oral every 4 hours PRN Dyspepsia  ARIPiprazole 2.5 milliGRAM(s) Oral <User Schedule> PRN agitation    Pertinent Labs: 05-05 @ 07:50: Na 138, BUN 19, Cr 1.1, BG 91, K+ 3.5, Phos --, Mg --, Alk Phos 84, ALT/SGPT 30, AST/SGOT 28, HbA1c --    Finger Sticks:    Physical Findings:  - Appearance: AOx3  - GI function: Last Bowel Movement: 04-May-2025 (05-04-25 @ 05:00)  Last Bowel Movement: 01-May-2025 (05-01-25 @ 18:17)  - Tubes: NC on 60 L/min  - Oral/Mouth cavity:  Ensure + Magic cup  - Edema: none noted  -skin:  intact          Nutrition Requirements:  with consideration for age, weight status, severe PCM, rib fractures, HF  Weight Used: 67.7 kg      Energy: 1787-2170kcal/day (using MSJ x 1.4-1.7)   Protein: 102-115g/day (using 1.5-1.7g/kg)   Fluid: 1500mL/day or per MD recommendations      Nutrient Intake: poor 25-50% though pt is maintaining weight over last week.    [x] Previous Nutrition Diagnosis: Severe protein-calorie malnutrition in the context of acute illness or injury  related to poor appetite vs dementia  as evidenced by PO <50% of needs x 2 wk, mild muscle wasting, sev fat loss, 8% weight loss x 1 wk              [x] Ongoing          [] Resolved    [] No active nutrition diagnosis identified at this time     Nutrition Education: deferred d/t acuity of illness      Goal/Expected Outcome: Pt to eat 50-75% of 4-6 small frequent meals in next 3-5 days.      Indicator/Monitoring: weekly anthroprometrics, GI S/S, -Lytes (Phos, Mag, K+), BM, I/Os, Labs, NFPF, GI fxn. Energy intake and tolerance.     Recommendation:   - c/w MVI + thiamine as medically feasible.  - c/w bowel regimen  - liberalize diet for increased po intake  - 1: 1 feeds w/ encouragement. please provide whole foods then oral supplement after whole foods  - jo-ann ORO and Imer Smyth x [phone] or Via TEAMS    high risk Follow Up .

## 2025-05-05 NOTE — PROGRESS NOTE ADULT - ASSESSMENT
Neuropsychology Consult      Neuropsychology was consulted to evaluate this is a 89-year-old male with a history of hypertension, presenting with acute mental status changes following a complex medical course including recent fall, acute heart failure with reduced ejection fraction (HFrEF), pulmonary embolism (PE), urinary retention (AUR), and nonsustained ventricular tachycardia (NSVT). His hospital course has been complicated by agitation, gross hematuria from Flores self-removal, and fluctuating mental status. Medications initiated for behavioral management include aripiprazole, divalproex, and quetiapine.    Baseline vs. Acute Cognitive Status  Baseline: Per family report, the patient was independent with most activities of daily living (ADLs) and functionally intact until approximately two weeks prior to admission, when they began noticing increased confusion and language difficulties. Additionally, family noted that six to eight weeks prior, the patient had started showing signs of disorientation, such as waking up at 12 to prepare breakfast after already eating earlier that morning. Moreover, family indicated that pt appetite decreased as well during those weeks. He was also increasingly forgetful, though the family attributed this to normal aging. No neurological or medical evaluation was sought at the time of these early symptoms. It was not until the recent fall and urinary tract infection that medical attention was pursued.    Current status: Now showing marked disorientation, expressive language deficits, and functional dependence, all of which represent a significant decline from baseline. Family and staff observed new confusion and language disturbances, described as “speaking gibberish.” Significant agitation, multiple Flores catheter removals, and poor safety awareness (e.g., multiple falls) suggest behavioral dysregulation and delirium.    Relevant Medical Events Contributing to Cognitive Decline  Pain management interventions likely contributed to increased confusion and AMS possibly due to sedating effects of medications.    Acute pulmonary embolism (PE) and hypoxic respiratory failure likely caused cerebral hypoxia, worsening cognitive function.    Newly diagnosed heart failure (HFrEF, EF 30–35%) suggests reduced cerebral perfusion, possibly compounding cognitive impairment.    Urinary retention and repeated catheterizations led to gross hematuria, pain, and increased agitation.    Medication changes, including beta-blockers and antihypertensives, may contribute to fatigue or bradycardia, worsening alertness and attention.    Electrolyte shifts and risk of overdiuresis further increase risk for metabolic encephalopathy.        Neurocognitive Findings:     Arousal and effort: Awake, alert, and cooperative with appropriate affect.    Orientation: x1 - oriented only to self; disoriented to time, place, and situation.    Language:Fluent but empty speech, often devoid of meaningful content.Phonemic paraphasias (sound-based errors), suggesting expressive aphasia. Poor repetition and confrontation naming with unrelated or distorted responses.    Comprehension: Variable, possibly influenced by attention fluctuation and hearing impairment, though deficits likely exceed what would be expected from sensory limitations alone.    Executive Functioning: Drawing and graphomotor tasks suggested impairment in executive abilities.    Memory: Severely limited verbal learning, suggesting poor encoding, with downstream effects on storage and recall.    These findings reflect diffuse global cognitive impairment, with prominent expressive language deficits and executive dysfunction, raising concern for a major neurocognitive disorder.       Neuropsychological Impression:     The patient presents with an acute-on-chronic cognitive decline marked by global impairment, with prominent deficits in language and executive functioning. These findings are likely rooted in an underlying vascular dementia etiology and have been significantly exacerbated by multiple concurrent medical stressors. Contributing factors include cerebral hypoxia from pulmonary embolism and hypoxic respiratory failure, reduced cerebral perfusion from new-onset heart failure, urinary retention with associated agitation and discomfort, pain from recent rib fractures, and the cognitive impact of malnutrition/ electrolyte disturbances, medication changes, and overdiuresis. While some reversible causes may improve his condition modestly, his overall prognosis is guarded. Given the extent of decline and underlying medical burden, a return to prior functional independence is unlikely, and he will require 24/7 supervision and structured care going forward.    Provided results of the evaluation with family.     No follow up needed. Re consult as needed.        Neuropsychology Consult      Neuropsychology was consulted to evaluate this is a 89-year-old male with a history of hypertension, presenting with acute mental status changes following a complex medical course including recent fall, acute heart failure with reduced ejection fraction (HFrEF), pulmonary embolism (PE), urinary retention (AUR), and nonsustained ventricular tachycardia (NSVT). His hospital course has been complicated by agitation, gross hematuria from Flores self-removal, and fluctuating mental status. Medications initiated for behavioral management include aripiprazole, divalproex, and quetiapine.    Baseline and Acute Cognitive Status  Baseline: Per family report, the patient was independent with most activities of daily living (ADLs) and functionally intact until approximately two weeks prior to admission, when they began noticing increased confusion and language difficulties. Additionally, family noted that six to eight weeks prior, the patient had started showing signs of disorientation, such as waking up at 12 to prepare breakfast after already eating earlier that morning. Moreover, family indicated that pt appetite decreased as well during those weeks. He was also increasingly forgetful, though the family attributed this to normal aging. No neurological or medical evaluation was sought at the time of these early symptoms. It was not until the recent fall and urinary tract infection that medical attention was pursued.    Current status: Now showing marked disorientation, expressive language deficits, and functional dependence, all of which represent a significant decline from baseline. Family and staff observed new confusion and language disturbances, described as “speaking gibberish.” Significant agitation, multiple Flores catheter removals, and poor safety awareness (e.g., multiple falls) suggest behavioral dysregulation and delirium.    Relevant Medical Events Contributing to Cognitive Decline  Pain management interventions likely contributed to increased confusion and AMS possibly due to sedating effects of medications.    Acute pulmonary embolism (PE) and hypoxic respiratory failure likely caused cerebral hypoxia, worsening cognitive function.    Newly diagnosed heart failure (HFrEF, EF 30–35%) suggests reduced cerebral perfusion, possibly compounding cognitive impairment.    Urinary retention and repeated catheterizations led to gross hematuria, pain, and increased agitation.    Medication changes, including beta-blockers and antihypertensives, may contribute to fatigue or bradycardia, worsening alertness and attention.    Electrolyte shifts and risk of overdiuresis further increase risk for metabolic encephalopathy.        Neurocognitive Findings:     Arousal and effort: Awake, alert, and cooperative with appropriate affect.    Orientation: x1 - oriented only to self; disoriented to time, place, and situation.    Language:Fluent but empty speech, often devoid of meaningful content.Phonemic paraphasias (sound-based errors), suggesting expressive aphasia. Poor repetition and confrontation naming with unrelated or distorted responses.    Comprehension: Variable, possibly influenced by attention fluctuation and hearing impairment, though deficits likely exceed what would be expected from sensory limitations alone.    Executive Functioning: Drawing and graphomotor tasks suggested impairment in executive abilities.    Memory: Severely limited verbal learning, suggesting poor encoding, with downstream effects on storage and recall.    These findings reflect diffuse global cognitive impairment, with prominent expressive language deficits and executive dysfunction, raising concern for a major neurocognitive disorder.       Neuropsychological Impression:     The patient presents with an acute-on-chronic cognitive decline marked by global impairment, with prominent deficits in language, executive functioning and memory. These findings are likely rooted in an underlying vascular dementia etiology and have been significantly exacerbated by multiple concurrent medical stressors. Contributing factors include cerebral hypoxia from pulmonary embolism and hypoxic respiratory failure, reduced cerebral perfusion from new-onset heart failure, urinary retention with associated agitation and discomfort, pain from recent rib fractures, and the cognitive impact of malnutrition/ electrolyte disturbances, medication changes, and overdiuresis. While some reversible causes may improve his condition modestly, his overall prognosis is guarded. Given the extent of decline and underlying medical burden, a return to prior functional independence is unlikely, and he will require 24/7 supervision and structured care going forward.    Provided results of the evaluation with family.     No follow up needed. Re consult as needed.

## 2025-05-05 NOTE — PROGRESS NOTE ADULT - ASSESSMENT
89 yo M pmhx htn presenting to the ED for evaluation. pt son at bedside endorsing he has been more confused and speaking "gibberish". pt fell 2 weeks ago, diagnosed with 4 Right sided rib fractures outpatient. Pt also with urinary retention. Pt had anaya placed in the ER on admission and he removed it and it was replaced again and then he removed again. Now with some light hematuria.   Psychiatry consulted to assist with medication management for Dementia with Agitation vs Delirium as QTc > 500 and can not use usual antipsychotics. On 4/23 pt endorsed some chest pain so transferred to [address] [place] [address], found to have acute PE.     #New onset HFrEF (April 2025 TTE EF 30-35%)  #New hypoxic respiratory failure   #Acute PE  - 4/25/25 TTE: Global LV systolic function was moderately decreased. Left ventricular ejection fraction, by visual estimation, is 30 to 35%.   - started GDMT --> introduced lisinopril 10 mg qd and metoprolol succ 25 mg qd 4/27  - discontinued nifedipine  - not a candidate for LifeVest or AICD due to age, dementia and poor functional status   - case discussed with cardio fellow. patient is not a candidate for ischemic work up. TSH = wnl, thiamine level ordered. Consider official cardio consult if any other questions.  - CXR stable -- got one dose of lasix 5/3--     #NSVT   - started BB, c/w metoprolol succ 25 mg qd  - keep K >4 and Mg > 2      #Acute PE  - 4/24/ 25 CTA noted. PE, no right heart strain  - Duplex negative  - Was previously on therapeutic Lovenox, WI'ed 5/1 AM in context of hematuria. restarted on lovenox on 5/3-- will need change to eliquis   - 5/5- currently on HFNC    #Urinary retention -- started at home  #Gross Hematuria ( traumatic - patient pulled anaya) -- resolved  - Possible causes of obstruction include BPH or constipation  - c/w Flomax   - US w no hydro or obstruction   - Anaya is removed and mittens removed-- bladder scan    #Worsening dementia   #Agitation   #H/o Multiple fall, deconditioning   #Multiple Anaya removal by pt w/ trauma and hematuria   - Per family - he lives upstairs and was independent with most ADLs (bathing, walking, eating). This decline represents a significant change from baseline  -- trauma w/up negative    -- Fall precautions   -- Psychiatry consultation appreciated - will follow med recs, started abilify and depakote, added seroquel 25 mg at bedtime on 04/29 -> time changed to 6 pm daily.    #elevated liver enzymes likely DILI vs congestion -- resolved    #Constipation  - C/w bowel regimen     #Incidental CT scan findings:  - Focal area of subcortical white matter hypoattenuation in the left frontoparietal region with questionable associated cortical involvement. Findings are nonspecific could represent chronic infarct, focal white matter changes including chronic microvascular ischemic changes, focal   gliosis, focal demyelination, If symptoms persist nonemergent outpatient brain MRI can be considered.  - Few ill-defined though nonaggressive appearing lucencies in the calvarium largest in the left frontal region.   - Few scattered lucencies throughout the vertebral column in conjunction with the above findings, nonspecific though can consider multiple myeloma  - if within GOC should be followed up as outpt     MISC  #DVT prophylaxis: Lovenox  #GI prophylaxis: PPI  #Diet: DASH  #Activity: AAT  #Code status: DNR/DNI  #Disposition: Admitted to medicine, once medically cleared will be dc to SNF    #Progress Note Handoff  Pending: Clinical improvement and stability__x__Weanoing off HF O2  Pt/Family discussion: Pt/family informed and agree with the current plan  Disposition: Nursing Home    To be determined____x____    My note supersedes the residents note should a discrepancy arise.    Chart and notes personally reviewed.  Care Discussed with Consultants/Other Providers/ Housestaff [ x] YES [ ] NO   Radiology, labs, old records personally reviewed.    discussed w/ housestaff, nursing, case management    Attestation Statements:    Attestation Statements:  Risk Statement (NON-critical care).     On this date of service, level of risk to patient is considered: High.     Due to: pt with illness causing risk to life or bodily fxn    Time-based billing (NON-critical care).     50 minutes spent on total encounter. The necessity of the time spent during the encounter on this date of service was due to:     time spent on review of labs, imaging studies, old records, obtaining history, personally examining patient, counselling and communicating with patient/ family, entering orders for medications/tests/etc, discussions with other health care providers, documentation in electronic health records, independent interpretation of labs, imaging/procedure results and care coordination.

## 2025-05-05 NOTE — PROGRESS NOTE ADULT - SUBJECTIVE AND OBJECTIVE BOX
Patient is a 89y old  Male who presents with a chief complaint of hematuria (05 May 2025 06:55)    INTERVAL HPI/OVERNIGHT EVENTS: Patient was examined and seen at bedside. This morning pt is resting in bed and denies specific complaints.     InitialHPI:   89 yo M pmhx htn presenting to the ED for evaluation. pt son at bedside endorsing he has been more confused and speaking "gibberish". pt fell 2 weeks ago, diagnosed with 4 R sided rib fractures outpatient. pt also with urinary retention, seen in the ED earlier today and had anaya placed. Denies fever, chills, abd pain, chest pain, calf pain, nausea, vomiting, headache.      · BP Systolic	125 mm Hg  · BP Diastolic	73 mm Hg  · Heart Rate	 101 /min  · Respiration Rate (breaths/min)	18 /min  · Temp (F)	97.7 Degrees F  · Temp (C)	36.5 Degrees C  · Temp site	oral  · SpO2 (%)	96 %  · O2 Delivery/Oxygen Delivery Method	room air  · Temp at ED Arrival (C)	36.5 Degrees C    Labs: Hb 15.2> 13.2,  Troponin 48>40, UA : bloody (traumatic )     CT head:    Bones are diffusely osteopenic.    No evidence of acute compression deformity or facet subluxation.    Dens is intact. No thickening of the prevertebral soft tissues.    Multilevel degenerative changes.    Small scattered slightly ill-defined lucencies throughout the vertebral   column. For example posterior aspect of the dens left aspect of C2.    Heterogeneous left thyroid lobe goiter, partially imaged       (19 Apr 2025 03:48)    PAST MEDICAL & SURGICAL HISTORY:  Hypertension          General: NAD, AAOx1, chronically ill appearing, +HF O2  HEENT:  no LAD  CV: S1 S2  Resp: decreased breath sounds at bases  GI: NT/ND/S +BS  MS: no clubbing/cyanosis/edema, + pulses b/l  Neuro: moves all extremities    MEDICATIONS  (STANDING):  brimonidine 0.2% Ophthalmic Solution 1 Drop(s) Both EYES two times a day  chlorhexidine 2% Cloths 1 Application(s) Topical daily  divalproex  milliGRAM(s) Oral <User Schedule>  divalproex  milliGRAM(s) Oral <User Schedule>  enoxaparin Injectable 80 milliGRAM(s) SubCutaneous every 12 hours  latanoprost 0.005% Ophthalmic Solution 1 Drop(s) Both EYES at bedtime  lisinopril 10 milliGRAM(s) Oral daily  melatonin 10 milliGRAM(s) Oral at bedtime  metoprolol succinate ER 25 milliGRAM(s) Oral daily  multivitamin 1 Tablet(s) Oral daily  polyethylene glycol 3350 17 Gram(s) Oral two times a day  QUEtiapine 25 milliGRAM(s) Oral <User Schedule>  senna 2 Tablet(s) Oral at bedtime  tamsulosin 0.8 milliGRAM(s) Oral at bedtime  thiamine 200 milliGRAM(s) Oral daily  timolol 0.5% Solution 1 Drop(s) Both EYES two times a day    MEDICATIONS  (PRN):  acetaminophen     Tablet .. 650 milliGRAM(s) Oral every 6 hours PRN Temp greater or equal to 38C (100.4F), Mild Pain (1 - 3)  aluminum hydroxide/magnesium hydroxide/simethicone Suspension 30 milliLiter(s) Oral every 4 hours PRN Dyspepsia  ARIPiprazole 2.5 milliGRAM(s) Oral <User Schedule> PRN agitation    Vital Signs Last 24 Hrs  T(C): 37.1 (05 May 2025 14:09), Max: 37.1 (05 May 2025 14:09)  T(F): 98.7 (05 May 2025 14:09), Max: 98.7 (05 May 2025 14:09)  HR: 77 (05 May 2025 14:09) (77 - 107)  BP: 135/78 (05 May 2025 14:09) (127/83 - 155/78)  BP(mean): 97 (05 May 2025 14:09) (97 - 97)  RR: 18 (05 May 2025 14:09) (18 - 18)  SpO2: 100% (05 May 2025 16:06) (96% - 100%)    Parameters below as of 05 May 2025 16:06  Patient On (Oxygen Delivery Method): nasal cannula, high flow  O2 Flow (L/min): 50  O2 Concentration (%): 50  CAPILLARY BLOOD GLUCOSE                              14.0   11.33 )-----------( 304      ( 05 May 2025 07:50 )             40.9     05-05    138  |  104  |  19  ----------------------------<  91  3.5   |  26  |  1.1    Ca    9.2      05 May 2025 07:50    TPro  6.3  /  Alb  3.2[L]  /  TBili  1.1  /  DBili  x   /  AST  28  /  ALT  30  /  AlkPhos  84  05-05    LIVER FUNCTIONS - ( 05 May 2025 07:50 )  Alb: 3.2 g/dL / Pro: 6.3 g/dL / ALK PHOS: 84 U/L / ALT: 30 U/L / AST: 28 U/L / GGT: x                 Urinalysis Basic - ( 05 May 2025 07:50 )    Color: x / Appearance: x / SG: x / pH: x  Gluc: 91 mg/dL / Ketone: x  / Bili: x / Urobili: x   Blood: x / Protein: x / Nitrite: x   Leuk Esterase: x / RBC: x / WBC x   Sq Epi: x / Non Sq Epi: x / Bacteria: x      ABG - ( 03 May 2025 23:18 )  pH, Arterial: 7.54  pH, Blood: x     /  pCO2: 25    /  pO2: 42    / HCO3: 21    / Base Excess: 0.3   /  SaO2: 77.2                    Chart, Consultant(s) Notes Reviewed:  [x ] YES  [ ] NO  Care Discussed with Consultants/Other Providers/ Housestaff [ x] YES  [ ] NO  Radiology, labs, old available records personally reviewed.

## 2025-05-05 NOTE — PROGRESS NOTE ADULT - SUBJECTIVE AND OBJECTIVE BOX
SUBJECTIVE/OVERNIGHT EVENTS  Today is hospital day 16d. This morning patient was seen and examined at bedside, resting comfortably in bed. No acute or major events overnight.    MEDICATIONS  STANDING MEDICATIONS  brimonidine 0.2% Ophthalmic Solution 1 Drop(s) Both EYES two times a day  chlorhexidine 2% Cloths 1 Application(s) Topical daily  divalproex  milliGRAM(s) Oral <User Schedule>  divalproex  milliGRAM(s) Oral <User Schedule>  enoxaparin Injectable 80 milliGRAM(s) SubCutaneous every 12 hours  latanoprost 0.005% Ophthalmic Solution 1 Drop(s) Both EYES at bedtime  lisinopril 10 milliGRAM(s) Oral daily  melatonin 10 milliGRAM(s) Oral at bedtime  metoprolol succinate ER 25 milliGRAM(s) Oral daily  multivitamin 1 Tablet(s) Oral daily  polyethylene glycol 3350 17 Gram(s) Oral two times a day  QUEtiapine 25 milliGRAM(s) Oral <User Schedule>  senna 2 Tablet(s) Oral at bedtime  tamsulosin 0.8 milliGRAM(s) Oral at bedtime  thiamine 200 milliGRAM(s) Oral daily  timolol 0.5% Solution 1 Drop(s) Both EYES two times a day    PRN MEDICATIONS  acetaminophen     Tablet .. 650 milliGRAM(s) Oral every 6 hours PRN  aluminum hydroxide/magnesium hydroxide/simethicone Suspension 30 milliLiter(s) Oral every 4 hours PRN  ARIPiprazole 2.5 milliGRAM(s) Oral <User Schedule> PRN    VITALS  T(F): 97.8 (05-05-25 @ 04:00), Max: 97.8 (05-05-25 @ 04:00)  HR: 107 (05-05-25 @ 04:00) (95 - 107)  BP: 155/78 (05-05-25 @ 04:00) (100/67 - 155/78)  RR: 18 (05-05-25 @ 04:00) (18 - 18)  SpO2: 96% (05-05-25 @ 04:00) (94% - 100%)    PHYSICAL EXAM  GEN: No acute distress  LUNGS: Clear to auscultation bilaterally   HEART: S1/S2 present. RRR.   ABD/ GI: Soft, non-tender, non-distended. Bowel sounds present  EXT: NC/NC/NE/2+PP/BOLES  NEURO: confused-- hard of hearing    LABS             14.4   11.46 )-----------( 302      ( 05-04-25 @ 04:30 )             41.8     142  |  105  |  17  -------------------------<  87   05-04-25 @ 04:30  3.9  |  21  |  1.2    Ca      8.9     05-04-25 @ 04:30    TPro  6.4  /  Alb  3.4  /  TBili  1.1  /  DBili  x   /  AST  31  /  ALT  34  /  AlkPhos  86  /  GGT  x     05-04-25 @ 04:30        Urinalysis Basic - ( 04 May 2025 04:30 )    Color: x / Appearance: x / SG: x / pH: x  Gluc: 87 mg/dL / Ketone: x  / Bili: x / Urobili: x   Blood: x / Protein: x / Nitrite: x   Leuk Esterase: x / RBC: x / WBC x   Sq Epi: x / Non Sq Epi: x / Bacteria: x      ABG - ( 03 May 2025 23:18 )  pH, Arterial: 7.54  pH, Blood: x     /  pCO2: 25    /  pO2: 42    / HCO3: 21    / Base Excess: 0.3   /  SaO2: 77.2                IMAGING

## 2025-05-05 NOTE — PROGRESS NOTE ADULT - ASSESSMENT
89 yo M pmhx htn presenting to the ED for evaluation. pt son at bedside endorsing he has been more confused and speaking "gibberish". pt fell 2 weeks ago, diagnosed with 4 Right sided rib fractures outpatient. Pt also with urinary retention. Pt had anaya placed in the ER on admission and he removed it and it was replaced again and then he removed again. Now with some light hematuria.   Psychiatry consulted to assist with medication management for Dementia with Agitation vs Delirium as QTc > 500 and can not use usual antipsychotics. On 4/23 pt endorsed some chest pain so transferred to [address] [place] [address], found to have acute PE.     #New onset HFrEF (April 2025 TTE EF 30-35%)  #New hypoxic respiratory failure   - 4/25/25 TTE: Global LV systolic function was moderately decreased. Left ventricular ejection fraction, by visual estimation, is 30 to 35%.   - start GDMT --> introduced lisinopril 10 mg qd and metoprolol succ 25 mg qd 4/27  - discontinued nifedipine  - not a candidate for LifeVest or AICD due to age, dementia and poor functional status   - case discussed with cardio fellow. patient is not a candidate for ischemic work up. TSH = wnl, thiamine level ordered. Consider official cardio consult if any other questions.  - CXR stable -- got one dose of lasix 5/3--  is overdiuresed -- off lasix    #NSVT   - started BB, c/w metoprolol succ 25 mg qd  - keep K >4 and Mg > 2   - keep on tele     #Acute PE  - 4/24/ 25 CTA noted. PE, no right heart strain  - on RA  - Duplex negative  - Was previously on therapeutic Lovenox, dc'ed 5/1 AM in context of hematuria. restarted on lovenox on 5/3-- will need change to eliquis     #Urinary retention -- started at home  #Gross Hematuria ( traumatic - patient pulled anaya) -- resolved  - Possible causes of obstruction include BPH or constipation  - c/w Flomax   - US w no hydro or obstruction   - Anaya is removed and mittens removed-- bladder scan    #Worsening dementia   #Agitation   #H/o Multiple fall, deconditioning   #Multiple Anaya removal by pt w/ trauma and hematuria   - Per family - he lives upstairs and was independent with most ADLs (bathing, walking, eating). This decline represents a significant change from baseline  -- trauma w/up negative    -- Fall precautions   -- Psychiatry consultation appreciated - will follow med recs, started abilify and depakote, added seroquel 25 mg at bedtime on 04/29 -> time changed to 6 pm daily.  -- on bilateral mittens -> can d/c if patient is comfortable    #elevated liver enzymes likely DILI vs congestion -- resolved    #Constipation  - C/w bowel regimen     #Incidental CT scan findings:  - Focal area of subcortical white matter hypoattenuation in the left frontoparietal region with questionable associated cortical involvement. Findings are nonspecific could represent chronic infarct, focal white matter changes including chronic microvascular ischemic changes, focal   gliosis, focal demyelination, If symptoms persist nonemergent outpatient brain MRI can be considered.  - Few ill-defined though nonaggressive appearing lucencies in the calvarium largest in the left frontal region.   - Few scattered lucencies throughout the vertebral column in conjunction with the above findings, nonspecific though can consider multiple myeloma  - if within GOC should be followed up as outpt     MISC  #DVT prophylaxis: Lovenox  #GI prophylaxis: PPI  #Diet: DASH  #Activity: AAT  #Code status: DNR/DNI  #Disposition: Admitted to medicine, once medically cleared will be dc to SNF  Pending: improvement in agitation, hematuria resolving; need to transition to Eliquis -- eventually will go to SNF-- still on constant observation   89 yo M pmhx htn presenting to the ED for evaluation. pt son at bedside endorsing he has been more confused and speaking "gibberish". pt fell 2 weeks ago, diagnosed with 4 Right sided rib fractures outpatient. Pt also with urinary retention. Pt had anaya placed in the ER on admission and he removed it and it was replaced again and then he removed again. Now with some light hematuria.   Psychiatry consulted to assist with medication management for Dementia with Agitation vs Delirium as QTc > 500 and can not use usual antipsychotics. On 4/23 pt endorsed some chest pain so transferred to [address] [place] [address], found to have acute PE.     #New onset HFrEF (April 2025 TTE EF 30-35%)  #New hypoxic respiratory failure   - 4/25/25 TTE: Global LV systolic function was moderately decreased. Left ventricular ejection fraction, by visual estimation, is 30 to 35%.   - start GDMT --> introduced lisinopril 10 mg qd and metoprolol succ 25 mg qd 4/27  - discontinued nifedipine  - not a candidate for LifeVest or AICD due to age, dementia and poor functional status   - case discussed with cardio fellow. patient is not a candidate for ischemic work up. TSH = wnl, thiamine level ordered. Consider official cardio consult if any other questions.  - CXR stable -- got one dose of lasix 5/3--  is overdiuresed -- off lasix    #NSVT   - started BB, c/w metoprolol succ 25 mg qd  - keep K >4 and Mg > 2   - keep on tele     #Acute PE  - 4/24/ 25 CTA noted. PE, no right heart strain  - Duplex negative  - Was previously on therapeutic Lovenox, RI'ed 5/1 AM in context of hematuria. restarted on lovenox on 5/3-- will need change to eliquis   - 5/5- currently on HFNC    #Urinary retention -- started at home  #Gross Hematuria ( traumatic - patient pulled anaya) -- resolved  - Possible causes of obstruction include BPH or constipation  - c/w Flomax   - US w no hydro or obstruction   - Anaya is removed and mittens removed-- bladder scan    #Worsening dementia   #Agitation   #H/o Multiple fall, deconditioning   #Multiple Anaya removal by pt w/ trauma and hematuria   - Per family - he lives upstairs and was independent with most ADLs (bathing, walking, eating). This decline represents a significant change from baseline  -- trauma w/up negative    -- Fall precautions   -- Psychiatry consultation appreciated - will follow med recs, started abilify and depakote, added seroquel 25 mg at bedtime on 04/29 -> time changed to 6 pm daily.  -- on bilateral mittens -> can d/c if patient is comfortable    #elevated liver enzymes likely DILI vs congestion -- resolved    #Constipation  - C/w bowel regimen     #Incidental CT scan findings:  - Focal area of subcortical white matter hypoattenuation in the left frontoparietal region with questionable associated cortical involvement. Findings are nonspecific could represent chronic infarct, focal white matter changes including chronic microvascular ischemic changes, focal   gliosis, focal demyelination, If symptoms persist nonemergent outpatient brain MRI can be considered.  - Few ill-defined though nonaggressive appearing lucencies in the calvarium largest in the left frontal region.   - Few scattered lucencies throughout the vertebral column in conjunction with the above findings, nonspecific though can consider multiple myeloma  - if within GOC should be followed up as outpt     MISC  #DVT prophylaxis: Lovenox  #GI prophylaxis: PPI  #Diet: DASH  #Activity: AAT  #Code status: DNR/DNI  #Disposition: Admitted to medicine, once medically cleared will be dc to SNF  Pending: improvement in agitation, hematuria resolving; need to transition to Eliquis -- eventually will go to SNF-- still on constant observation

## 2025-05-06 NOTE — PROGRESS NOTE ADULT - ASSESSMENT
89 yo M pmhx htn presenting to the ED for evaluation. pt son at bedside endorsing he has been more confused and speaking "gibberish". pt fell 2 weeks ago, diagnosed with 4 Right sided rib fractures outpatient. Pt also with urinary retention. Pt had anaya placed in the ER on admission and he removed it and it was replaced again and then he removed again. Now with some light hematuria.   Psychiatry consulted to assist with medication management for Dementia with Agitation vs Delirium as QTc > 500 and can not use usual antipsychotics. On 4/23 pt endorsed some chest pain so transferred to [address] [place] [address], found to have acute PE.     #New onset HFrEF (April 2025 TTE EF 30-35%)  #New hypoxic respiratory failure   #Acute PE  - 4/25/25 TTE: Global LV systolic function was moderately decreased. Left ventricular ejection fraction, by visual estimation, is 30 to 35%.   - started GDMT --> introduced lisinopril 10 mg qd and metoprolol succ 25 mg qd 4/27  - discontinued nifedipine  - not a candidate for LifeVest or AICD due to age, dementia and poor functional status   - case discussed with cardio fellow. patient is not a candidate for ischemic work up. TSH = wnl, thiamine level ordered. Consider official cardio consult if any other questions.  - CXR stable -- got one dose of lasix 5/3--     #NSVT   - started BB, c/w metoprolol succ 25 mg qd  - keep K >4 and Mg > 2      #Acute PE  - 4/24/ 25 CTA noted. PE, no right heart strain  - Duplex negative  - Was previously on therapeutic Lovenox, MA'ed 5/1 AM in context of hematuria. restarted on lovenox on 5/3-- will need change to eliquis   - 5/5- currently on HFNC. Unable to wean off    #Urinary retention -- started at home  #Gross Hematuria ( traumatic - patient pulled anaya) -- resolved  - Possible causes of obstruction include BPH or constipation  - c/w Flomax   - US w no hydro or obstruction   - Anaya is removed and mittens removed-- bladder scan    #Worsening dementia (vascular as per neuro and neuropsych)  #Agitation   #H/o Multiple fall, deconditioning   #Multiple Anaya removal by pt w/ trauma and hematuria   - Per family - he lives upstairs and was independent with most ADLs (bathing, walking, eating). This decline represents a significant change from baseline  -- trauma w/up negative    -- Fall precautions   -- Psychiatry consultation appreciated - will follow med recs, started abilify and depakote, added seroquel 25 mg at bedtime on 04/29 -> time changed to 6 pm daily.    #elevated liver enzymes likely DILI vs congestion -- resolved    #Constipation  - C/w bowel regimen     #Incidental CT scan findings:  - Focal area of subcortical white matter hypoattenuation in the left frontoparietal region with questionable associated cortical involvement. Findings are nonspecific could represent chronic infarct, focal white matter changes including chronic microvascular ischemic changes, focal   gliosis, focal demyelination, If symptoms persist nonemergent outpatient brain MRI can be considered.  - Few ill-defined though nonaggressive appearing lucencies in the calvarium largest in the left frontal region.   - Few scattered lucencies throughout the vertebral column in conjunction with the above findings, nonspecific though can consider multiple myeloma  - if within GOC should be followed up as outpt     MISC  #DVT prophylaxis: Lovenox  #GI prophylaxis: PPI  #Diet: DASH  #Activity: AAT  #Code status: DNR/DNI  #Disposition: Admitted to medicine, once medically cleared will be dc to SNF    #Progress Note Handoff  Pending: Clinical improvement and stability__x__Weanoing off HF O2  Pt/Family discussion: Pt/family informed and agree with the current plan  Disposition: Nursing Home    To be determined____x____    My note supersedes the residents note should a discrepancy arise.    Chart and notes personally reviewed.  Care Discussed with Consultants/Other Providers/ Housestaff [ x] YES [ ] NO   Radiology, labs, old records personally reviewed.    discussed w/ housestaff, nursing, case management    Attestation Statements:    Attestation Statements:  Risk Statement (NON-critical care).     On this date of service, level of risk to patient is considered: High.     Due to: pt with illness causing risk to life or bodily fxn    Time-based billing (NON-critical care).     50 minutes spent on total encounter. The necessity of the time spent during the encounter on this date of service was due to:     time spent on review of labs, imaging studies, old records, obtaining history, personally examining patient, counselling and communicating with patient/ family, entering orders for medications/tests/etc, discussions with other health care providers, documentation in electronic health records, independent interpretation of labs, imaging/procedure results and care coordination.

## 2025-05-06 NOTE — PROGRESS NOTE ADULT - ASSESSMENT
89 yo M pmhx htn presenting to the ED for evaluation. pt son at bedside endorsing he has been more confused and speaking "gibberish". pt fell 2 weeks ago, diagnosed with 4 Right sided rib fractures outpatient. Pt also with urinary retention. Pt had anaya placed in the ER on admission and he removed it and it was replaced again and then he removed again. Now with some light hematuria.   Psychiatry consulted to assist with medication management for Dementia with Agitation vs Delirium as QTc > 500 and can not use usual antipsychotics. On 4/23 pt endorsed some chest pain so transferred to  tele, found to have acute PE.     #New onset HFrEF (April 2025 TTE EF 30-35%)  #New hypoxic respiratory failure   - 4/25/25 TTE: Global LV systolic function was moderately decreased. Left ventricular ejection fraction, by visual estimation, is 30 to 35%.   - start GDMT --> introduced lisinopril 10 mg qd and metoprolol succ 25 mg qd 4/27  - discontinued nifedipine  - not a candidate for LifeVest or AICD due to age, dementia and poor functional status   - case discussed with cardio fellow. patient is not a candidate for ischemic work up. TSH = wnl, thiamine level ordered. Consider official cardio consult if any other questions.  - CXR stable -- got one dose of lasix 5/3--  is overdiuresed -- off lasix    #NSVT   - started BB, c/w metoprolol succ 25 mg qd  - keep K >4 and Mg > 2   - keep on tele     #Acute PE  - 4/24/ 25 CTA noted. PE, no right heart strain  - Duplex negative  - Was previously on therapeutic Lovenox, ID'ed 5/1 AM in context of hematuria. restarted on lovenox on 5/3-- will need change to eliquis   - 5/5- currently on HFNC; on 5/6 attempting to wean to NC    #Urinary retention -- started at home  #Gross Hematuria (traumatic - patient pulled anaya) -- resolved  - Possible causes of obstruction include BPH or constipation  - c/w Flomax   - US w no hydro or obstruction   - Anaya is removed and mittens removed-- bladder scan    #Worsening dementia   #Agitation   #H/o Multiple fall, deconditioning   #Multiple Anaya removal by pt w/ trauma and hematuria   - Per family - he lives upstairs and was independent with most ADLs (bathing, walking, eating). This decline represents a significant change from baseline  -- trauma w/up negative    -- Fall precautions   -- Psychiatry consultation appreciated - will follow med recs, started abilify and depakote, added seroquel 25 mg at bedtime on 04/29 -> time changed to 6 pm daily.  -- on bilateral mittens -> can d/c if patient is comfortable  - 5/5 neuropsych consult-  acute-on-chronic cognitive decline marked by global impairment, with prominent deficits in language, executive functioning and memory.  - 5/6- given acute change in cognition (onset over approx 6-8 weeks), neuro consult placed   - eeg ordered, Vitamin B1, Vit B12, syphilis    #elevated liver enzymes likely DILI vs congestion -- resolved    #Constipation  - C/w bowel regimen     #Incidental CT scan findings:  - Focal area of subcortical white matter hypoattenuation in the left frontoparietal region with questionable associated cortical involvement. Findings are nonspecific could represent chronic infarct, focal white matter changes including chronic microvascular ischemic changes, focal   gliosis, focal demyelination, If symptoms persist nonemergent outpatient brain MRI can be considered.  - Few ill-defined though nonaggressive appearing lucencies in the calvarium largest in the left frontal region.   - Few scattered lucencies throughout the vertebral column in conjunction with the above findings, nonspecific though can consider multiple myeloma  - if within GOC should be followed up as outpt     MISC  #DVT prophylaxis: Lovenox  #GI prophylaxis: PPI  #Diet: DASH  #Activity: AAT  #Code status: DNR/DNI  #Disposition: Admitted to medicine, once medically cleared will be dc to SNF  Pending: EEG, neuro consult, improvement in agitation, hematuria resolving; need to transition to Eliquis -- eventually will go to SNF-- still on constant observation   89 yo M pmhx htn presenting to the ED for evaluation. pt son at bedside endorsing he has been more confused and speaking "gibberish". pt fell 2 weeks ago, diagnosed with 4 Right sided rib fractures outpatient. Pt also with urinary retention. Pt had anaya placed in the ER on admission and he removed it and it was replaced again and then he removed again. Now with some light hematuria.   Psychiatry consulted to assist with medication management for Dementia with Agitation vs Delirium as QTc > 500 and can not use usual antipsychotics. On 4/23 pt endorsed some chest pain so transferred to [address] [place] [address], found to have acute PE.     #New onset HFrEF (April 2025 TTE EF 30-35%)  #New hypoxic respiratory failure   - 4/25/25 TTE: Global LV systolic function was moderately decreased. Left ventricular ejection fraction, by visual estimation, is 30 to 35%.   - start GDMT --> introduced lisinopril 10 mg qd and metoprolol succ 25 mg qd 4/27  - discontinued nifedipine  - not a candidate for LifeVest or AICD due to age, dementia, and poor functional status   - case discussed with cardio fellow. patient is not a candidate for ischemic work up. TSH = wnl, thiamine level ordered. Consider official cardio consult if any other questions.  - CXR stable -- got one dose of lasix 5/3--  is overdiuresed -- off lasix    #NSVT   - started BB, c/w metoprolol succ 25 mg qd  - keep K >4 and Mg > 2   - keep on tele     #Acute PE  - 4/24/ 25 CTA noted. PE, no right heart strain  - Duplex negative  - Was previously on therapeutic Lovenox, NJ'ed 5/1 AM in context of hematuria. restarted on lovenox on 5/3-- will need change to eliquis   - 5/5- currently on HFNC; on 5/6 attempting to wean to NC    #Urinary retention -- started at home  #Gross Hematuria (traumatic - patient pulled anaya) -- resolved  - Possible causes of obstruction include BPH or constipation  - c/w Flomax   - US w no hydro or obstruction   - Anaya is removed and mittens removed-- bladder scan  - TOV started on 5/5 at night    #Worsening dementia   #Agitation   #H/o Multiple fall, deconditioning   #Multiple Anaya removal by pt w/ trauma and hematuria   - Per family - he lives upstairs and was independent with most ADLs (bathing, walking, eating). This decline represents a significant change from baseline  -- trauma w/up negative    -- Fall precautions   -- Psychiatry consultation appreciated - will follow med recs, started abilify and depakote, added seroquel 25 mg at bedtime on 04/29 -> time changed to 6 pm daily.  -- on bilateral mittens -> can d/c if patient is comfortable  - 5/5 neuropsych consult-  acute-on-chronic cognitive decline marked by global impairment, with prominent deficits in language, executive functioning and memory.  - 5/6- given acute change in cognition (onset over approx 6-8 weeks), neuro consult placed   - eeg ordered, Vitamin B1, Vit B12, syphilis    #elevated liver enzymes likely DILI vs congestion -- resolved    #Constipation  - C/w bowel regimen     #Incidental CT scan findings:  - Focal area of subcortical white matter hypoattenuation in the left frontoparietal region with questionable associated cortical involvement. Findings are nonspecific could represent chronic infarct, focal white matter changes including chronic microvascular ischemic changes, focal   gliosis, focal demyelination, If symptoms persist nonemergent outpatient brain MRI can be considered.  - Few ill-defined though nonaggressive appearing lucencies in the calvarium largest in the left frontal region.   - Few scattered lucencies throughout the vertebral column in conjunction with the above findings, nonspecific though can consider multiple myeloma  - if within GOC should be followed up as outpt     MISC  #DVT prophylaxis: Lovenox  #GI prophylaxis: PPI  #Diet: DASH  #Activity: AAT  #Code status: DNR/DNI  #Disposition: Admitted to medicine, once medically cleared will be dc to SNF  Pending: EEG, neuro consult, transition to [facility] -- eventually will go to SNF-- still on constant observation

## 2025-05-06 NOTE — PROGRESS NOTE ADULT - SUBJECTIVE AND OBJECTIVE BOX
Patient is a 89y old  Male who presents with a chief complaint of hematuria (05 May 2025 06:55)    INTERVAL HPI/OVERNIGHT EVENTS: Patient was examined and seen at bedside. This morning pt is resting in bed and denies specific complaints. Denies CP, SOB, AP.    InitialHPI:   89 yo M pmhx htn presenting to the ED for evaluation. pt son at bedside endorsing he has been more confused and speaking "gibberish". pt fell 2 weeks ago, diagnosed with 4 R sided rib fractures outpatient. pt also with urinary retention, seen in the ED earlier today and had anaya placed. Denies fever, chills, abd pain, chest pain, calf pain, nausea, vomiting, headache.      · BP Systolic	125 mm Hg  · BP Diastolic	73 mm Hg  · Heart Rate	 101 /min  · Respiration Rate (breaths/min)	18 /min  · Temp (F)	97.7 Degrees F  · Temp (C)	36.5 Degrees C  · Temp site	oral  · SpO2 (%)	96 %  · O2 Delivery/Oxygen Delivery Method	room air  · Temp at ED Arrival (C)	36.5 Degrees C    Labs: Hb 15.2> 13.2,  Troponin 48>40, UA : bloody (traumatic )     CT head:    Bones are diffusely osteopenic.    No evidence of acute compression deformity or facet subluxation.    Dens is intact. No thickening of the prevertebral soft tissues.    Multilevel degenerative changes.    Small scattered slightly ill-defined lucencies throughout the vertebral   column. For example posterior aspect of the dens left aspect of C2.    Heterogeneous left thyroid lobe goiter, partially imaged       (19 Apr 2025 03:48)    PAST MEDICAL & SURGICAL HISTORY:  Hypertension          General: NAD, AAOx1, chronically ill appearing, +HF O2, [place]  HEENT:  no LAD  CV: S1 S2  Resp: decreased breath sounds at bases  GI: NT/ND/S +BS  MS: no clubbing/cyanosis/edema, + pulses b/l  Neuro: moves all extremities          Home Medications:  brimonidine 0.2% ophthalmic solution: 1 drop(s) to each affected eye 2 times a day (21 Apr 2025 09:54)  enoxaparin: 40 milligram(s) subcutaneous once a day (21 Apr 2025 09:54)  latanoprost 0.005% ophthalmic solution: 1 drop(s) to each affected eye once a day (at bedtime) (21 Apr 2025 09:54)  NIFEdipine 30 mg oral tablet, extended release: 1 tab(s) orally once a day (21 Apr 2025 09:54)  tamsulosin 0.4 mg oral capsule: 1 cap(s) orally once a day (at bedtime) (21 Apr 2025 09:54)  timolol maleate 0.5% ophthalmic solution: 1 drop(s) to each affected eye 2 times a day (21 Apr 2025 09:54)    MEDICATIONS  (STANDING):  brimonidine 0.2% Ophthalmic Solution 1 Drop(s) Both EYES two times a day  chlorhexidine 2% Cloths 1 Application(s) Topical daily  divalproex  milliGRAM(s) Oral <User Schedule>  divalproex  milliGRAM(s) Oral <User Schedule>  enoxaparin Injectable 80 milliGRAM(s) SubCutaneous every 12 hours  latanoprost 0.005% Ophthalmic Solution 1 Drop(s) Both EYES at bedtime  lisinopril 10 milliGRAM(s) Oral daily  melatonin 10 milliGRAM(s) Oral at bedtime  metoprolol succinate ER 25 milliGRAM(s) Oral daily  multivitamin 1 Tablet(s) Oral daily  polyethylene glycol 3350 17 Gram(s) Oral two times a day  QUEtiapine 25 milliGRAM(s) Oral <User Schedule>  senna 2 Tablet(s) Oral at bedtime  tamsulosin 0.8 milliGRAM(s) Oral at bedtime  timolol 0.5% Solution 1 Drop(s) Both EYES two times a day    MEDICATIONS  (PRN):  acetaminophen     Tablet .. 650 milliGRAM(s) Oral every 6 hours PRN Temp greater or equal to 38C (100.4F), Mild Pain (1 - 3)  aluminum hydroxide/magnesium hydroxide/simethicone Suspension 30 milliLiter(s) Oral every 4 hours PRN Dyspepsia  ARIPiprazole 2.5 milliGRAM(s) Oral <User Schedule> PRN agitation    Vital Signs Last 24 Hrs  T(C): 36.4 (06 May 2025 19:58), Max: 37.2 (06 May 2025 14:01)  T(F): 97.6 (06 May 2025 19:58), Max: 99 (06 May 2025 14:01)  HR: 94 (06 May 2025 19:58) (82 - 94)  BP: 111/72 (06 May 2025 19:58) (111/72 - 122/80)  BP(mean): 81 (06 May 2025 19:58) (81 - 94)  RR: 18 (06 May 2025 19:58) (18 - 18)  SpO2: 94% (06 May 2025 19:58) (94% - 97%)    Parameters below as of 06 May 2025 19:58  Patient On (Oxygen Delivery Method): nasal cannula, high flow      CAPILLARY BLOOD GLUCOSE        LABS:                        14.0   11.33 )-----------( 304      ( 05 May 2025 07:50 )             40.9     05-05    138  |  104  |  19  ----------------------------<  91  3.5   |  26  |  1.1    Ca    9.2      05 May 2025 07:50    TPro  6.3  /  Alb  3.2[L]  /  TBili  1.1  /  DBili  x   /  AST  28  /  ALT  30  /  AlkPhos  84  05-05    LIVER FUNCTIONS - ( 05 May 2025 07:50 )  Alb: 3.2 g/dL / Pro: 6.3 g/dL / ALK PHOS: 84 U/L / ALT: 30 U/L / AST: 28 U/L / GGT: x                 Urinalysis Basic - ( 05 May 2025 07:50 )    Color: x / Appearance: x / SG: x / pH: x  Gluc: 91 mg/dL / Ketone: x  / Bili: x / Urobili: x   Blood: x / Protein: x / Nitrite: x   Leuk Esterase: x / RBC: x / WBC x   Sq Epi: x / Non Sq Epi: x / Bacteria: x              Consultant Notes Reviewed:  [x ] YES  [ ] NO  Care Discussed with Consultants/Other Providers/ Housestaff [ x] YES  [ ] NO  Radiology, labs, EKGs, new studies personally reviewed.

## 2025-05-06 NOTE — PROGRESS NOTE ADULT - SUBJECTIVE AND OBJECTIVE BOX
SUBJECTIVE/OVERNIGHT EVENTS  Today is hospital day 17d. This morning patient was seen and examined at bedside, resting comfortably in bed. No acute or major events overnight.    MEDICATIONS  STANDING MEDICATIONS  brimonidine 0.2% Ophthalmic Solution 1 Drop(s) Both EYES two times a day  chlorhexidine 2% Cloths 1 Application(s) Topical daily  divalproex  milliGRAM(s) Oral <User Schedule>  divalproex  milliGRAM(s) Oral <User Schedule>  enoxaparin Injectable 80 milliGRAM(s) SubCutaneous every 12 hours  latanoprost 0.005% Ophthalmic Solution 1 Drop(s) Both EYES at bedtime  lisinopril 10 milliGRAM(s) Oral daily  melatonin 10 milliGRAM(s) Oral at bedtime  metoprolol succinate ER 25 milliGRAM(s) Oral daily  multivitamin 1 Tablet(s) Oral daily  polyethylene glycol 3350 17 Gram(s) Oral two times a day  QUEtiapine 25 milliGRAM(s) Oral <User Schedule>  senna 2 Tablet(s) Oral at bedtime  tamsulosin 0.8 milliGRAM(s) Oral at bedtime  timolol 0.5% Solution 1 Drop(s) Both EYES two times a day    PRN MEDICATIONS  acetaminophen     Tablet .. 650 milliGRAM(s) Oral every 6 hours PRN  aluminum hydroxide/magnesium hydroxide/simethicone Suspension 30 milliLiter(s) Oral every 4 hours PRN  ARIPiprazole 2.5 milliGRAM(s) Oral <User Schedule> PRN    VITALS  T(F): 97.7 (05-06-25 @ 04:32), Max: 98.7 (05-05-25 @ 14:09)  HR: 82 (05-06-25 @ 04:32) (77 - 82)  BP: 118/63 (05-06-25 @ 04:32) (111/71 - 135/78)  RR: 18 (05-06-25 @ 04:32) (18 - 18)  SpO2: 97% (05-06-25 @ 08:25) (95% - 100%)    PHYSICAL EXAM  GEN: No acute distress  LUNGS: Clear to auscultation bilaterally   HEART: S1/S2 present. RRR.   ABD/ GI: Soft, non-tender, non-distended. Bowel sounds present  EXT: NC/NC/NE/2+PP/BOLES  NEURO: confused-- hard of hearing    LABS             14.0   11.33 )-----------( 304      ( 05-05-25 @ 07:50 )             40.9     138  |  104  |  19  -------------------------<  91   05-05-25 @ 07:50  3.5  |  26  |  1.1    Ca      9.2     05-05-25 @ 07:50    TPro  6.3  /  Alb  3.2  /  TBili  1.1  /  DBili  x   /  AST  28  /  ALT  30  /  AlkPhos  84  /  GGT  x     05-05-25 @ 07:50        Urinalysis Basic - ( 05 May 2025 07:50 )    Color: x / Appearance: x / SG: x / pH: x  Gluc: 91 mg/dL / Ketone: x  / Bili: x / Urobili: x   Blood: x / Protein: x / Nitrite: x   Leuk Esterase: x / RBC: x / WBC x   Sq Epi: x / Non Sq Epi: x / Bacteria: x

## 2025-05-06 NOTE — CONSULT NOTE ADULT - ASSESSMENT
INCOMPLETE    89M PMH of HTN, hearing loss, and dementia a/f AMS. Neuro consulted for acute change in mental status. On assessment, patient is A&Ox1. He is disoriented, confused, and does not answer most questions and states "I don't know" to most of the questions he answers. No focal neurological deficits on exam. Mild dysarthria that fluctuates and may be influenced by dentures.     The history in the chart is c/w acute     Neurology will sign off   89M PMH of HTN, hearing loss, and dementia a/f AMS. Neuro consulted for acute change in mental status. On assessment, patient is A&Ox1. He is disoriented, confused, and does not answer most questions and states "I don't know" to most of the questions he answers. No focal neurological deficits on exam. Mild dysarthria that fluctuates. CTH showed no acute pathology, however it did demonstrate a chronic focal area of subcortical white matter hypoattenuation which maybe due to chronic infarct and other focal chronic pathologies.     Patient presentation c/w acute exacerbation of dementia. Imaging and presentation is most consistent with vascular.  Neurology assessment is c/w neuropsychology evaluation which showed diffuse cognitive impairment with expressive language deficits and executive dysfunction.     Neurology will sign off

## 2025-05-07 NOTE — PROGRESS NOTE ADULT - ASSESSMENT
89 yo M pmhx htn presenting to the ED for evaluation. pt son at bedside endorsing he has been more confused and speaking "gibberish". pt fell 2 weeks ago, diagnosed with 4 Right sided rib fractures outpatient. Pt also with urinary retention. Pt had anaya placed in the ER on admission and he removed it and it was replaced again and then he removed again. Now with some light hematuria.   Psychiatry consulted to assist with medication management for Dementia with Agitation vs Delirium as QTc > 500 and can not use usual antipsychotics. On 4/23 pt endorsed some chest pain so transferred to [address] [place] [address], found to have acute PE.     #New onset HFrEF (April 2025 TTE EF 30-35%)  #New hypoxic respiratory failure   - 4/25/25 TTE: Global LV systolic function was moderately decreased. Left ventricular ejection fraction, by visual estimation, is 30 to 35%.   - start GDMT --> introduced lisinopril 10 mg qd and metoprolol succ 25 mg qd 4/27  - discontinued nifedipine  - not a candidate for LifeVest or AICD due to age, dementia, and poor functional status   - case discussed with cardio fellow. patient is not a candidate for ischemic work up. TSH = wnl, thiamine level ordered. Consider official cardio consult if any other questions.  - CXR stable -- got one dose of lasix 5/3--  on lasix 10mg od PO  - On HFNC, inability to come off O2 --> repeat CXR    #NSVT   - started BB, c/w metoprolol succ 25 mg qd  - keep K >4 and Mg > 2   - keep on tele     #Acute PE  - 4/24/ 25 CTA noted. PE, no right heart strain  - Duplex negative  - Was previously on therapeutic Lovenox, dc'ed 5/1 AM in context of hematuria. restarted on lovenox on 5/3-- will need change to eliquis   - 5/5- currently on HFNC; on 5/6 attempting to wean to NC    #Urinary retention -- started at home  #Gross Hematuria (traumatic - patient pulled anaya) -- resolved  - Possible causes of obstruction include BPH or constipation  - c/w Flomax   - US w no hydro or obstruction   - Anaya is removed and mittens removed-- bladder scan  - TOV started on 5/5: failed --> anaya reinserted    #Worsening dementia   #Agitation   #H/o Multiple fall, deconditioning   #Multiple Anaya removal by pt w/ trauma and hematuria   - Per family - he lives upstairs and was independent with most ADLs (bathing, walking, eating). This decline represents a significant change from baseline  -- trauma w/up negative    -- Fall precautions   -- Psychiatry consultation appreciated - will follow med recs, started abilify and depakote, added seroquel 25 mg at bedtime on 04/29 -> time changed to 6 pm daily.  -- on bilateral mittens -> can d/c if patient is comfortable  - 5/5 neuropsych consult-  acute-on-chronic cognitive decline marked by global impairment, with prominent deficits in language, executive functioning and memory.  - 5/6- given acute change in cognition (onset over approx 6-8 weeks), neuro consult noted (signing off)   - eeg ordered, Vitamin B1, Vit B12 Nl, syphilis    #elevated liver enzymes likely DILI vs congestion -- resolved    #Constipation  - C/w bowel regimen     #Incidental CT scan findings:  - Focal area of subcortical white matter hypoattenuation in the left frontoparietal region with questionable associated cortical involvement. Findings are nonspecific could represent chronic infarct, focal white matter changes including chronic microvascular ischemic changes, focal   gliosis, focal demyelination, If symptoms persist nonemergent outpatient brain MRI can be considered.  - Few ill-defined though nonaggressive appearing lucencies in the calvarium largest in the left frontal region.   - Few scattered lucencies throughout the vertebral column in conjunction with the above findings, nonspecific though can consider multiple myeloma  - if within GOC should be followed up as outpt     MISC  #DVT prophylaxis: Lovenox  #GI prophylaxis: PPI  #Diet: DASH  #Activity: AAT  #Code status: DNR/DNI  #Disposition: Admitted to medicine, once medically cleared will be dc to SNF  Pending: EEG, O2 requirements, transition to Eliquis -- eventually will go to SNF

## 2025-05-07 NOTE — PROGRESS NOTE ADULT - ASSESSMENT
89 yo M pmhx htn presenting to the ED for evaluation. pt son at bedside endorsing he has been more confused and speaking "gibberish". pt fell 2 weeks ago, diagnosed with 4 Right sided rib fractures outpatient. Pt also with urinary retention. Pt had anaya placed in the ER on admission and he removed it and it was replaced again and then he removed again. Now with some light hematuria.   Psychiatry consulted to assist with medication management for Dementia with Agitation vs Delirium as QTc > 500 and can not use usual antipsychotics. On 4/23 pt endorsed some chest pain so transferred to [address] [place] [address], found to have acute PE.     #New onset HFrEF (April 2025 TTE EF 30-35%)  #New hypoxic respiratory failure   #Acute PE  - 4/25/25 TTE: Global LV systolic function was moderately decreased. Left ventricular ejection fraction, by visual estimation, is 30 to 35%.   - started GDMT --> introduced lisinopril 10 mg qd and metoprolol succ 25 mg qd 4/27  - discontinued nifedipine  - not a candidate for LifeVest or AICD due to age, dementia and poor functional status   - case discussed with cardio fellow. patient is not a candidate for ischemic work up. TSH = wnl, thiamine level ordered. Consider official cardio consult if any other questions.  - CXR stable -- got one dose of lasix 5/3--   unable to wean off HF O2 yet    #NSVT   - started BB, c/w metoprolol succ 25 mg qd  - keep K >4 and Mg > 2      #Acute PE  - 4/24/ 25 CTA noted. PE, no right heart strain  - Duplex negative  - Was previously on therapeutic Lovenox, MS'ed 5/1 AM in context of hematuria. restarted on lovenox on 5/3-- will need change to eliquis   - 5/5- currently on HFNC. Unable to wean off    #Urinary retention -- started at home  #Gross Hematuria ( traumatic - patient pulled anaya) -- resolved  - Possible causes of obstruction include BPH or constipation  - c/w Flomax   - US w no hydro or obstruction   - Anaya is removed and mittens removed-- bladder scan  -anaya had to be reinserted  -5/7 hematuria in anaya. Flush w/ sterile water Q6    #Worsening dementia (vascular as per neuro and neuropsych)  #Agitation   #H/o Multiple fall, deconditioning   #Multiple Anaya removal by pt w/ trauma and hematuria   - Per family - he lives upstairs and was independent with most ADLs (bathing, walking, eating). This decline represents a significant change from baseline  -- trauma w/up negative    -- Fall precautions   -- Psychiatry consultation appreciated - will follow med recs, started abilify and depakote, added seroquel 25 mg at bedtime on 04/29 -> time changed to 6 pm daily.    #elevated liver enzymes likely DILI vs congestion -- resolved    #Constipation  - C/w bowel regimen     #Incidental CT scan findings:  - Focal area of subcortical white matter hypoattenuation in the left frontoparietal region with questionable associated cortical involvement. Findings are nonspecific could represent chronic infarct, focal white matter changes including chronic microvascular ischemic changes, focal gliosis, focal demyelination, If symptoms persist nonemergent outpatient brain MRI can be considered.  - Few ill-defined though nonaggressive appearing lucencies in the calvarium largest in the left frontal region.   - Few scattered lucencies throughout the vertebral column in conjunction with the above findings, nonspecific though can consider multiple myeloma  - if within GOC should be followed up as outpt     MISC  #DVT prophylaxis: Lovenox  #GI prophylaxis: PPI  #Diet: DASH  #Activity: AAT  #Code status: DNR/DNI  #Disposition: Admitted to medicine, once medically cleared will be dc to SNF    #Progress Note Handoff  Pending: Clinical improvement and stability__x__Weanoing off HF O2  Pt/Family discussion: Pt/family informed and agree with the current plan  Disposition: Nursing Home    To be determined____x____    My note supersedes the residents note should a discrepancy arise.    Chart and notes personally reviewed.  Care Discussed with Consultants/Other Providers/ Housestaff [ x] YES [ ] NO   Radiology, labs, old records personally reviewed.    discussed w/ housestaff, nursing, case management    Attestation Statements:    Attestation Statements:  Risk Statement (NON-critical care).     On this date of service, level of risk to patient is considered: High.     Due to: pt with illness causing risk to life or bodily fxn    Time-based billing (NON-critical care).     50 minutes spent on total encounter. The necessity of the time spent during the encounter on this date of service was due to:     time spent on review of labs, imaging studies, old records, obtaining history, personally examining patient, counselling and communicating with patient/ family, entering orders for medications/tests/etc, discussions with other health care providers, documentation in electronic health records, independent interpretation of labs, imaging/procedure results and care coordination.

## 2025-05-07 NOTE — PROGRESS NOTE ADULT - SUBJECTIVE AND OBJECTIVE BOX
24H events:    Patient is a 89y old Male who presents with a chief complaint of hematuria (06 May 2025 20:39)    Primary diagnosis of Confusion  Today is hospital day 18d. This morning patient was seen and examined at bedside, resting comfortably in bed.    No acute or major events overnight. Still confused, on HFNC. TOV failed with reinsertion of anaya    Code Status: DNR/DNI    PAST MEDICAL & SURGICAL HISTORY  Hypertension      SOCIAL HISTORY:  Social History:      ALLERGIES:  No Known Allergies    MEDICATIONS:  STANDING MEDICATIONS  brimonidine 0.2% Ophthalmic Solution 1 Drop(s) Both EYES two times a day  chlorhexidine 2% Cloths 1 Application(s) Topical daily  divalproex  milliGRAM(s) Oral <User Schedule>  divalproex  milliGRAM(s) Oral <User Schedule>  enoxaparin Injectable 80 milliGRAM(s) SubCutaneous every 12 hours  furosemide    Tablet 10 milliGRAM(s) Oral daily  latanoprost 0.005% Ophthalmic Solution 1 Drop(s) Both EYES at bedtime  lisinopril 10 milliGRAM(s) Oral daily  melatonin 10 milliGRAM(s) Oral at bedtime  metoprolol succinate ER 25 milliGRAM(s) Oral daily  multivitamin 1 Tablet(s) Oral daily  polyethylene glycol 3350 17 Gram(s) Oral two times a day  QUEtiapine 25 milliGRAM(s) Oral <User Schedule>  senna 2 Tablet(s) Oral at bedtime  tamsulosin 0.8 milliGRAM(s) Oral at bedtime  timolol 0.5% Solution 1 Drop(s) Both EYES two times a day    PRN MEDICATIONS  acetaminophen     Tablet .. 650 milliGRAM(s) Oral every 6 hours PRN  aluminum hydroxide/magnesium hydroxide/simethicone Suspension 30 milliLiter(s) Oral every 4 hours PRN  ARIPiprazole 2.5 milliGRAM(s) Oral <User Schedule> PRN    VITALS:   T(F): 97.5  HR: 98  BP: 105/67  RR: 18  SpO2: 100%    PHYSICAL EXAM:  GEN: No acute distress  LUNGS: Clear to auscultation bilaterally   HEART: S1/S2 present. RRR.   ABD/ GI: Soft, non-tender, non-distended. Bowel sounds present  EXT: NC/NC/NE/2+PP/BOLES  NEURO: confused-- hard of hearing    LABS:                        15.1   11.46 )-----------( 313      ( 07 May 2025 08:01 )             44.5     05-07    148[H]  |  110  |  22[H]  ----------------------------<  97  3.6   |  22  |  1.1    Ca    9.3      07 May 2025 08:01  Mg     2.3     05-07    TPro  6.4  /  Alb  3.3[L]  /  TBili  1.5[H]  /  DBili  x   /  AST  26  /  ALT  25  /  AlkPhos  99  05-07      Urinalysis Basic - ( 07 May 2025 08:01 )    Color: x / Appearance: x / SG: x / pH: x  Gluc: 97 mg/dL / Ketone: x  / Bili: x / Urobili: x   Blood: x / Protein: x / Nitrite: x   Leuk Esterase: x / RBC: x / WBC x   Sq Epi: x / Non Sq Epi: x / Bacteria: x                RADIOLOGY:

## 2025-05-07 NOTE — PROGRESS NOTE ADULT - SUBJECTIVE AND OBJECTIVE BOX
Patient is a 89y old  Male who presents with a chief complaint of hematuria (05 May 2025 06:55)    INTERVAL HPI/OVERNIGHT EVENTS: Patient was examined and seen at bedside. This morning pt is resting in bed and denies specific complaints. Denies CP, SOB, AP.    InitialHPI:   89 yo M pmhx htn presenting to the ED for evaluation. pt son at bedside endorsing he has been more confused and speaking "gibberish". pt fell 2 weeks ago, diagnosed with 4 R sided rib fractures outpatient. pt also with urinary retention, seen in the ED earlier today and had anaya placed. Denies fever, chills, abd pain, chest pain, calf pain, nausea, vomiting, headache.      · BP Systolic	125 mm Hg  · BP Diastolic	73 mm Hg  · Heart Rate	 101 /min  · Respiration Rate (breaths/min)	18 /min  · Temp (F)	97.7 Degrees F  · Temp (C)	36.5 Degrees C  · Temp site	oral  · SpO2 (%)	96 %  · O2 Delivery/Oxygen Delivery Method	room air  · Temp at ED Arrival (C)	36.5 Degrees C    Labs: Hb 15.2> 13.2,  Troponin 48>40, UA : bloody (traumatic )     CT head:    Bones are diffusely osteopenic.    No evidence of acute compression deformity or facet subluxation.    Dens is intact. No thickening of the prevertebral soft tissues.    Multilevel degenerative changes.    Small scattered slightly ill-defined lucencies throughout the vertebral   column. For example posterior aspect of the dens left aspect of C2.    Heterogeneous left thyroid lobe goiter, partially imaged       (19 Apr 2025 03:48)    PAST MEDICAL & SURGICAL HISTORY:  Hypertension          General: NAD, AAOx1, chronically ill appearing, +HF O2, [place]  HEENT:  no LAD  CV: S1 S2  Resp: decreased breath sounds at bases  GI: NT/ND/S +BS  MS: no clubbing/cyanosis/edema, + pulses b/l  Neuro: moves all extremities            Home Medications:  brimonidine 0.2% ophthalmic solution: 1 drop(s) to each affected eye 2 times a day (21 Apr 2025 09:54)  enoxaparin: 40 milligram(s) subcutaneous once a day (21 Apr 2025 09:54)  latanoprost 0.005% ophthalmic solution: 1 drop(s) to each affected eye once a day (at bedtime) (21 Apr 2025 09:54)  NIFEdipine 30 mg oral tablet, extended release: 1 tab(s) orally once a day (21 Apr 2025 09:54)  tamsulosin 0.4 mg oral capsule: 1 cap(s) orally once a day (at bedtime) (21 Apr 2025 09:54)  timolol maleate 0.5% ophthalmic solution: 1 drop(s) to each affected eye 2 times a day (21 Apr 2025 09:54)    MEDICATIONS  (STANDING):  brimonidine 0.2% Ophthalmic Solution 1 Drop(s) Both EYES two times a day  chlorhexidine 2% Cloths 1 Application(s) Topical daily  divalproex  milliGRAM(s) Oral <User Schedule>  divalproex  milliGRAM(s) Oral <User Schedule>  enoxaparin Injectable 80 milliGRAM(s) SubCutaneous every 12 hours  furosemide    Tablet 10 milliGRAM(s) Oral daily  latanoprost 0.005% Ophthalmic Solution 1 Drop(s) Both EYES at bedtime  lisinopril 10 milliGRAM(s) Oral daily  melatonin 10 milliGRAM(s) Oral at bedtime  metoprolol succinate ER 25 milliGRAM(s) Oral daily  multivitamin 1 Tablet(s) Oral daily  polyethylene glycol 3350 17 Gram(s) Oral two times a day  QUEtiapine 25 milliGRAM(s) Oral <User Schedule>  senna 2 Tablet(s) Oral at bedtime  tamsulosin 0.8 milliGRAM(s) Oral at bedtime  timolol 0.5% Solution 1 Drop(s) Both EYES two times a day    MEDICATIONS  (PRN):  acetaminophen     Tablet .. 650 milliGRAM(s) Oral every 6 hours PRN Temp greater or equal to 38C (100.4F), Mild Pain (1 - 3)  aluminum hydroxide/magnesium hydroxide/simethicone Suspension 30 milliLiter(s) Oral every 4 hours PRN Dyspepsia  ARIPiprazole 2.5 milliGRAM(s) Oral <User Schedule> PRN agitation    Vital Signs Last 24 Hrs  T(C): 36.7 (07 May 2025 19:24), Max: 36.8 (07 May 2025 13:18)  T(F): 98.1 (07 May 2025 19:24), Max: 98.3 (07 May 2025 13:18)  HR: 110 (07 May 2025 19:24) (85 - 110)  BP: 101/- (07 May 2025 19:24) (99/66 - 105/67)  BP(mean): 79 (07 May 2025 19:24) (77 - 80)  RR: 18 (07 May 2025 19:24) (18 - 18)  SpO2: 93% (07 May 2025 19:24) (92% - 100%)    Parameters below as of 07 May 2025 13:18  Patient On (Oxygen Delivery Method): nasal cannula  O2 Flow (L/min): 5    CAPILLARY BLOOD GLUCOSE        LABS:                        15.1   11.46 )-----------( 313      ( 07 May 2025 08:01 )             44.5     05-07    148[H]  |  110  |  22[H]  ----------------------------<  97  3.6   |  22  |  1.1    Ca    9.3      07 May 2025 08:01  Mg     2.3     05-07    TPro  6.4  /  Alb  3.3[L]  /  TBili  1.5[H]  /  DBili  x   /  AST  26  /  ALT  25  /  AlkPhos  99  05-07    LIVER FUNCTIONS - ( 07 May 2025 08:01 )  Alb: 3.3 g/dL / Pro: 6.4 g/dL / ALK PHOS: 99 U/L / ALT: 25 U/L / AST: 26 U/L / GGT: x                 Urinalysis Basic - ( 07 May 2025 08:01 )    Color: x / Appearance: x / SG: x / pH: x  Gluc: 97 mg/dL / Ketone: x  / Bili: x / Urobili: x   Blood: x / Protein: x / Nitrite: x   Leuk Esterase: x / RBC: x / WBC x   Sq Epi: x / Non Sq Epi: x / Bacteria: x              Consultant Notes Reviewed:  [x ] YES  [ ] NO  Care Discussed with Consultants/Other Providers/ Housestaff [ x] YES  [ ] NO  Radiology, labs, EKGs, new studies personally reviewed.

## 2025-05-08 NOTE — PROGRESS NOTE ADULT - SUBJECTIVE AND OBJECTIVE BOX
Patient is a 89y old  Male who presents with a chief complaint of hematuria (05 May 2025 06:55)    INTERVAL HPI/OVERNIGHT EVENTS: Patient was examined and seen at bedside. This morning pt is resting in bed and denies specific complaints. Denies CP, SOB, AP. Still not eating well as per staff.    InitialHPI:   89 yo M pmhx htn presenting to the ED for evaluation. pt son at bedside endorsing he has been more confused and speaking "gibberish". pt fell 2 weeks ago, diagnosed with 4 R sided rib fractures outpatient. pt also with urinary retention, seen in the ED earlier today and had anaya placed. Denies fever, chills, abd pain, chest pain, calf pain, nausea, vomiting, headache.      · BP Systolic	125 mm Hg  · BP Diastolic	73 mm Hg  · Heart Rate	 101 /min  · Respiration Rate (breaths/min)	18 /min  · Temp (F)	97.7 Degrees F  · Temp (C)	36.5 Degrees C  · Temp site	oral  · SpO2 (%)	96 %  · O2 Delivery/Oxygen Delivery Method	room air  · Temp at ED Arrival (C)	36.5 Degrees C    Labs: Hb 15.2> 13.2,  Troponin 48>40, UA : bloody (traumatic )     CT head:    Bones are diffusely osteopenic.    No evidence of acute compression deformity or facet subluxation.    Dens is intact. No thickening of the prevertebral soft tissues.    Multilevel degenerative changes.    Small scattered slightly ill-defined lucencies throughout the vertebral   column. For example posterior aspect of the dens left aspect of C2.    Heterogeneous left thyroid lobe goiter, partially imaged       (19 Apr 2025 03:48)    PAST MEDICAL & SURGICAL HISTORY:  Hypertension          General: NAD, AAOx1, chronically ill appearing, +NC O2, [place]  HEENT:  no LAD  CV: S1 S2  Resp: decreased breath sounds at bases  GI: NT/ND/S +BS  MS: no clubbing/cyanosis/edema, + pulses b/l  Neuro: moves all extremities          Home Medications:  brimonidine 0.2% ophthalmic solution: 1 drop(s) to each affected eye 2 times a day (21 Apr 2025 09:54)  enoxaparin: 40 milligram(s) subcutaneous once a day (21 Apr 2025 09:54)  latanoprost 0.005% ophthalmic solution: 1 drop(s) to each affected eye once a day (at bedtime) (21 Apr 2025 09:54)  NIFEdipine 30 mg oral tablet, extended release: 1 tab(s) orally once a day (21 Apr 2025 09:54)  tamsulosin 0.4 mg oral capsule: 1 cap(s) orally once a day (at bedtime) (21 Apr 2025 09:54)  timolol maleate 0.5% ophthalmic solution: 1 drop(s) to each affected eye 2 times a day (21 Apr 2025 09:54)    MEDICATIONS  (STANDING):  brimonidine 0.2% Ophthalmic Solution 1 Drop(s) Both EYES two times a day  chlorhexidine 2% Cloths 1 Application(s) Topical daily  divalproex  milliGRAM(s) Oral <User Schedule>  divalproex  milliGRAM(s) Oral <User Schedule>  enoxaparin Injectable 80 milliGRAM(s) SubCutaneous every 12 hours  furosemide    Tablet 10 milliGRAM(s) Oral daily  latanoprost 0.005% Ophthalmic Solution 1 Drop(s) Both EYES at bedtime  lisinopril 10 milliGRAM(s) Oral daily  melatonin 10 milliGRAM(s) Oral at bedtime  metoprolol succinate ER 50 milliGRAM(s) Oral daily  multivitamin 1 Tablet(s) Oral daily  polyethylene glycol 3350 17 Gram(s) Oral two times a day  QUEtiapine 25 milliGRAM(s) Oral <User Schedule>  senna 2 Tablet(s) Oral at bedtime  tamsulosin 0.8 milliGRAM(s) Oral at bedtime  timolol 0.5% Solution 1 Drop(s) Both EYES two times a day    MEDICATIONS  (PRN):  acetaminophen     Tablet .. 650 milliGRAM(s) Oral every 6 hours PRN Temp greater or equal to 38C (100.4F), Mild Pain (1 - 3)  aluminum hydroxide/magnesium hydroxide/simethicone Suspension 30 milliLiter(s) Oral every 4 hours PRN Dyspepsia  ARIPiprazole 2.5 milliGRAM(s) Oral <User Schedule> PRN agitation    Vital Signs Last 24 Hrs  T(C): 37.5 (08 May 2025 14:14), Max: 37.5 (08 May 2025 14:14)  T(F): 99.5 (08 May 2025 14:14), Max: 99.5 (08 May 2025 14:14)  HR: 105 (08 May 2025 14:14) (101 - 110)  BP: 101/70 (08 May 2025 14:14) (96/64 - 101/-)  BP(mean): 80 (08 May 2025 14:14) (74 - 80)  RR: 18 (08 May 2025 14:14) (18 - 18)  SpO2: 99% (08 May 2025 14:14) (92% - 99%)    Parameters below as of 08 May 2025 14:14  Patient On (Oxygen Delivery Method): nasal cannula  O2 Flow (L/min): 4    CAPILLARY BLOOD GLUCOSE        LABS:                        15.1   11.46 )-----------( 313      ( 07 May 2025 08:01 )             44.5     05-07    148[H]  |  110  |  22[H]  ----------------------------<  97  3.6   |  22  |  1.1    Ca    9.3      07 May 2025 08:01  Mg     2.3     05-07    TPro  6.4  /  Alb  3.3[L]  /  TBili  1.5[H]  /  DBili  x   /  AST  26  /  ALT  25  /  AlkPhos  99  05-07    LIVER FUNCTIONS - ( 07 May 2025 08:01 )  Alb: 3.3 g/dL / Pro: 6.4 g/dL / ALK PHOS: 99 U/L / ALT: 25 U/L / AST: 26 U/L / GGT: x                 Urinalysis Basic - ( 07 May 2025 08:01 )    Color: x / Appearance: x / SG: x / pH: x  Gluc: 97 mg/dL / Ketone: x  / Bili: x / Urobili: x   Blood: x / Protein: x / Nitrite: x   Leuk Esterase: x / RBC: x / WBC x   Sq Epi: x / Non Sq Epi: x / Bacteria: x              Consultant Notes Reviewed:  [x ] YES  [ ] NO  Care Discussed with Consultants/Other Providers/ Housestaff [ x] YES  [ ] NO  Radiology, labs, EKGs, new studies personally reviewed.

## 2025-05-08 NOTE — PROGRESS NOTE ADULT - ASSESSMENT
89 yo M pmhx htn presenting to the ED for evaluation. pt son at bedside endorsing he has been more confused and speaking "gibberish". pt fell 2 weeks ago, diagnosed with 4 Right sided rib fractures outpatient. Pt also with urinary retention. Pt had anaya placed in the ER on admission and he removed it and it was replaced again and then he removed again. Now with some light hematuria.   Psychiatry consulted to assist with medication management for Dementia with Agitation vs Delirium as QTc > 500 and can not use usual antipsychotics. On 4/23 pt endorsed some chest pain so transferred to [address] [place] [address], found to have acute PE.     #New onset HFrEF (April 2025 TTE EF 30-35%)  #New hypoxic respiratory failure   #Acute PE  - 4/25/25 TTE: Global LV systolic function was moderately decreased. Left ventricular ejection fraction, by visual estimation, is 30 to 35%.   - started GDMT --> introduced lisinopril 10 mg qd and metoprolol succ 25 mg qd 4/27  - discontinued nifedipine  - not a candidate for LifeVest or AICD due to age, dementia and poor functional status   - case discussed with cardio fellow. patient is not a candidate for ischemic work up. TSH = wnl, thiamine level ordered. Consider official cardio consult if any other questions.  - CXR stable -- got one dose of lasix 5/3--   off HF O2     #NSVT   - started BB, c/w metoprolol succ 25 mg qd  - keep K >4 and Mg > 2      #Acute PE  - 4/24/ 25 CTA noted. PE, no right heart strain  - Duplex negative  - Was previously on therapeutic Lovenox, WI'ed 5/1 AM in context of hematuria. restarted on lovenox on 5/3-- will need change to eliquis   - 5/5- currently on HFNC.   5/8 off HF    #Urinary retention -- started at home  #Gross Hematuria ( traumatic - patient pulled anaya) -- resolved  - Possible causes of obstruction include BPH or constipation  - c/w Flomax   - US w no hydro or obstruction   - Anaya is removed and mittens removed-- bladder scan  -anaya had to be reinserted  -5/7 hematuria in anaya. Flush w/ sterile water Q6    #Worsening dementia (vascular as per neuro and neuropsych)  #Agitation   #H/o Multiple fall, deconditioning   #Multiple Anaya removal by pt w/ trauma and hematuria   - Per family - he lives upstairs and was independent with most ADLs (bathing, walking, eating). This decline represents a significant change from baseline  -- trauma w/up negative    -- Fall precautions   -- Psychiatry consultation appreciated - will follow med recs, started abilify and depakote, added seroquel 25 mg at bedtime on 04/29 -> time changed to 6 pm daily.    #elevated liver enzymes likely DILI vs congestion -- resolved    #Constipation  - C/w bowel regimen     #Incidental CT scan findings:  - Focal area of subcortical white matter hypoattenuation in the left frontoparietal region with questionable associated cortical involvement. Findings are nonspecific could represent chronic infarct, focal white matter changes including chronic microvascular ischemic changes, focal gliosis, focal demyelination, If symptoms persist nonemergent outpatient brain MRI can be considered.  - Few ill-defined though nonaggressive appearing lucencies in the calvarium largest in the left frontal region.   - Few scattered lucencies throughout the vertebral column in conjunction with the above findings, nonspecific though can consider multiple myeloma  - if within GOC should be followed up as outpt     #Poor PO intake  staff d/w family who will decide on possibility of feeding tube  will try Marinol    MISC  #DVT prophylaxis: Lovenox  #GI prophylaxis: PPI  #Diet: DASH  #Activity: AAT  #Code status: DNR/DNI  #Disposition: Admitted to medicine, once medically cleared will be dc to SNF    #Progress Note Handoff  Pending: Clinical improvement and stability__x__Increased PO intake  Pt/Family discussion: Pt/family informed and agree with the current plan  Disposition: Nursing Home    To be determined____x____    My note supersedes the residents note should a discrepancy arise.    Chart and notes personally reviewed.  Care Discussed with Consultants/Other Providers/ Housestaff [ x] YES [ ] NO   Radiology, labs, old records personally reviewed.    discussed w/ housestaff, nursing, case management    Attestation Statements:    Attestation Statements:  Risk Statement (NON-critical care).     On this date of service, level of risk to patient is considered: High.     Due to: pt with illness causing risk to life or bodily fxn    Time-based billing (NON-critical care).     50 minutes spent on total encounter. The necessity of the time spent during the encounter on this date of service was due to:     time spent on review of labs, imaging studies, old records, obtaining history, personally examining patient, counselling and communicating with patient/ family, entering orders for medications/tests/etc, discussions with other health care providers, documentation in electronic health records, independent interpretation of labs, imaging/procedure results and care coordination.

## 2025-05-08 NOTE — PROGRESS NOTE ADULT - SUBJECTIVE AND OBJECTIVE BOX
24H events:    Patient is a 89y old Male who presents with a chief complaint of hematuria (07 May 2025 20:39)    Primary diagnosis of Confusion  Today is hospital day 19d. This morning patient was seen and examined at bedside, resting comfortably in bed.    No acute or major events overnight.    Code Status: DNR/DNI    PAST MEDICAL & SURGICAL HISTORY  Hypertension      SOCIAL HISTORY:  Social History:      ALLERGIES:  No Known Allergies    MEDICATIONS:  STANDING MEDICATIONS  brimonidine 0.2% Ophthalmic Solution 1 Drop(s) Both EYES two times a day  chlorhexidine 2% Cloths 1 Application(s) Topical daily  divalproex  milliGRAM(s) Oral <User Schedule>  divalproex  milliGRAM(s) Oral <User Schedule>  enoxaparin Injectable 80 milliGRAM(s) SubCutaneous every 12 hours  furosemide    Tablet 10 milliGRAM(s) Oral daily  latanoprost 0.005% Ophthalmic Solution 1 Drop(s) Both EYES at bedtime  lisinopril 10 milliGRAM(s) Oral daily  melatonin 10 milliGRAM(s) Oral at bedtime  metoprolol succinate ER 25 milliGRAM(s) Oral daily  multivitamin 1 Tablet(s) Oral daily  polyethylene glycol 3350 17 Gram(s) Oral two times a day  QUEtiapine 25 milliGRAM(s) Oral <User Schedule>  senna 2 Tablet(s) Oral at bedtime  tamsulosin 0.8 milliGRAM(s) Oral at bedtime  timolol 0.5% Solution 1 Drop(s) Both EYES two times a day    PRN MEDICATIONS  acetaminophen     Tablet .. 650 milliGRAM(s) Oral every 6 hours PRN  aluminum hydroxide/magnesium hydroxide/simethicone Suspension 30 milliLiter(s) Oral every 4 hours PRN  ARIPiprazole 2.5 milliGRAM(s) Oral <User Schedule> PRN    VITALS:   T(F): 98  HR: 101  BP: 96/64  RR: 18  SpO2: 92%    PHYSICAL EXAM:  GEN: No acute distress  LUNGS: Clear to auscultation bilaterally   HEART: S1/S2 present. RRR.   ABD/ GI: Soft, non-tender, non-distended. Bowel sounds present  EXT: NC/NC/NE/2+PP/BOLES  NEURO: confused-- hard of hearing  [place]: 5/7/25    LABS:                        15.1   11.46 )-----------( 313      ( 07 May 2025 08:01 )             44.5     05-07    148[H]  |  110  |  22[H]  ----------------------------<  97  3.6   |  22  |  1.1    Ca    9.3      07 May 2025 08:01  Mg     2.3     05-07    TPro  6.4  /  Alb  3.3[L]  /  TBili  1.5[H]  /  DBili  x   /  AST  26  /  ALT  25  /  AlkPhos  99  05-07      Urinalysis Basic - ( 07 May 2025 08:01 )    Color: x / Appearance: x / SG: x / pH: x  Gluc: 97 mg/dL / Ketone: x  / Bili: x / Urobili: x   Blood: x / Protein: x / Nitrite: x   Leuk Esterase: x / RBC: x / WBC x   Sq Epi: x / Non Sq Epi: x / Bacteria: x                RADIOLOGY:

## 2025-05-08 NOTE — PROGRESS NOTE ADULT - ASSESSMENT
89 yo M pmhx htn presenting to the ED for evaluation. pt son at bedside endorsing he has been more confused and speaking "gibberish". pt fell 2 weeks ago, diagnosed with 4 Right sided rib fractures outpatient. Pt also with urinary retention. Pt had anaya placed in the ER on admission and he removed it and it was replaced again and then he removed again. Now with some light hematuria.   Psychiatry consulted to assist with medication management for Dementia with Agitation vs Delirium as QTc > 500 and can not use usual antipsychotics. On 4/23 pt endorsed some chest pain so transferred to [address] [place] [address], found to have acute PE.     #New onset HFrEF (April 2025 TTE EF 30-35%)  #New hypoxic respiratory failure   - 4/25/25 TTE: Global LV systolic function was moderately decreased. Left ventricular ejection fraction, by visual estimation, is 30 to 35%.   - start GDMT --> introduced lisinopril 10 mg qd and metoprolol succ 25 mg qd 4/27  - discontinued nifedipine  - not a candidate for LifeVest or AICD due to age, dementia, and poor functional status   - case discussed with cardio fellow. patient is not a candidate for ischemic work up. TSH = wnl, thiamine level ordered. Consider official cardio consult if any other questions.  - CXR stable -- got one dose of lasix 5/3--  on lasix 10mg od PO  - CXR (5/7/25): stable --> on NC 4L/min    #NSVT   - started BB, make metoprolol succ 50 mg qd  - keep K >4 and Mg > 2     #Acute PE  - 4/24/ 25 CTA noted. PE, no right heart strain  - Duplex negative  - Was previously on therapeutic Lovenox, dc'ed 5/1 AM in context of hematuria. restarted on lovenox on 5/3-- will need change to eliquis   - 5/5- currently on HFNC; on 5/6 attempting to wean to NC    #Urinary retention -- started at home  #Gross Hematuria (traumatic - patient pulled anaya) -- resolved  - Possible causes of obstruction include BPH or constipation  - c/w Flomax   - US w no hydro or obstruction   - Anaya is removed and mittens removed-- bladder scan  - TOV started on 5/5: failed --> anaya reinserted    #Worsening dementia   #Agitation   #H/o Multiple fall, deconditioning   #Multiple Anaya removal by pt w/ trauma and hematuria   - Per family - he lives upstairs and was independent with most ADLs (bathing, walking, eating). This decline represents a significant change from baseline  -- trauma w/up negative    -- Fall precautions   -- Psychiatry consultation appreciated - will follow med recs, started abilify and depakote, added seroquel 25 mg at bedtime on 04/29 -> time changed to 6 pm daily.  -- on bilateral mittens -> can d/c if patient is comfortable  - 5/5 neuropsych consult-  acute-on-chronic cognitive decline marked by global impairment, with prominent deficits in language, executive functioning and memory.  - 5/6- given acute change in cognition (onset over approx 6-8 weeks), neuro consult noted (signing off)   - eeg/B12/syphillis neg, Vitamin B1 pending    #elevated liver enzymes likely DILI vs congestion -- resolved    #Constipation  - C/w bowel regimen   - Check KUB    #Incidental CT scan findings:  - Focal area of subcortical white matter hypoattenuation in the left frontoparietal region with questionable associated cortical involvement. Findings are nonspecific could represent chronic infarct, focal white matter changes including chronic microvascular ischemic changes, focal   gliosis, focal demyelination, If symptoms persist nonemergent outpatient brain MRI can be considered.  - Few ill-defined though nonaggressive appearing lucencies in the calvarium largest in the left frontal region.   - Few scattered lucencies throughout the vertebral column in conjunction with the above findings, nonspecific though can consider multiple myeloma  - if within GOC should be followed up as outpt     MISC  #DVT prophylaxis: Lovenox  #GI prophylaxis: PPI  #Diet: DASH  #Activity: AAT  #Code status: DNR/DNI  #Disposition: Admitted to medicine, once medically cleared will be dc to SNF  Pending: O2 requirements, transition to Eliquis, improved PO intake -- eventually will go to SNF

## 2025-05-09 NOTE — CONSULT NOTE ADULT - CONVERSATION DETAILS
Patient seen at bedside. He was awake but unable to participate in exam.  Primary team spoke with son Gurjit earlier today who agreed to comfort measures only, but did not discuss HFNC removal.  Called and spoke with son Gurjit. Palliative care introduced. He was able to discuss the patient's poor overall prognosis and clinical deterioration, and confirmed comfort measures only. We discussed HFNC removal in the setting of CMO.  Gurjit wishes to pursue comfort measures only with HFNC. All questions answered.

## 2025-05-09 NOTE — CONSULT NOTE ADULT - CONSULT REASON
hematuria 2/2 pulled anaya; retention
89 year old M, with acute-on-chronic cognitive decline marked by global impairment, with prominent deficits in language, executive functioning and memory. requesting neurology consult for further recs  ordering routine EEG, thank you
GOC

## 2025-05-09 NOTE — PROVIDER CONTACT NOTE (OTHER) - ACTION/TREATMENT ORDERED:
as per DM will order EKG.
Pt is ordered oxygen via NRB, patient is placed on high flow oxygen.
md angel notified and aware, will cont to monitor

## 2025-05-09 NOTE — PROGRESS NOTE ADULT - CONVERSATION DETAILS
Detail Level: Detailed
Discussed with son that his father deteriorated and is on HFNC+NRFM. Patient is in need for upgrade to SDU. Discussed GOCs and decided for CMO.
Spoke w/ pt's son Gurjit at bedside on 4/22/25 for GOC. Pt's son explained that he does not want his father hooked on to a machine and does not want compressions preformed on his father. Pt is now DNR/DNI
d/w son extensively who confirmed DNR/DNI and wants pt comfortable. Son agreed to CMO. Started Ativan and Morphine. D/c vitals, labs, VS, meds unless for comfort

## 2025-05-09 NOTE — PROGRESS NOTE ADULT - ASSESSMENT
89 yo M pmhx htn presenting to the ED for evaluation. pt son at bedside endorsing he has been more confused and speaking "gibberish". pt fell 2 weeks ago, diagnosed with 4 Right sided rib fractures outpatient. Pt also with urinary retention. Pt had anaya placed in the ER on admission and he removed it and it was replaced again and then he removed again. Now with some light hematuria.   Psychiatry consulted to assist with medication management for Dementia with Agitation vs Delirium as QTc > 500 and can not use usual antipsychotics. On 4/23 pt endorsed some chest pain so transferred to [address] [place] [address], found to have acute PE.     #New onset HFrEF (April 2025 TTE EF 30-35%)  #New hypoxic respiratory failure   #Acute PE  - 4/25/25 TTE: Global LV systolic function was moderately decreased. Left ventricular ejection fraction, by visual estimation, is 30 to 35%.   - started GDMT --> introduced lisinopril 10 mg qd and metoprolol succ 25 mg qd 4/27  - discontinued nifedipine  - not a candidate for LifeVest or AICD due to age, dementia and poor functional status   - case discussed with cardio fellow. patient is not a candidate for ischemic work up. TSH = wnl, thiamine level ordered. Consider official cardio consult if any other questions.  - CXR stable -- got one dose of lasix 5/3--   -5/9 increased O2 reqs and desats even on HF    #NSVT   - started BB, c/w metoprolol succ   - keep K >4 and Mg > 2      #Acute PE  - 4/24/ 25 CTA noted. PE, no right heart strain  - Duplex negative  - Was previously on therapeutic Lovenox, MA'ed 5/1 AM in context of hematuria. restarted on lovenox on 5/3-- will need change to eliquis   - 5/5- currently on HFNC.       #Urinary retention -- started at home  #Gross Hematuria ( traumatic - patient pulled anaya) -- resolved  - Possible causes of obstruction include BPH or constipation  - c/w Flomax   - US w no hydro or obstruction   - Anaya is removed and mittens removed-- bladder scan  -anaya had to be reinserted  -5/7 hematuria in anaya. Flush w/ sterile water Q6    #Worsening dementia (vascular as per neuro and neuropsych)  #Agitation   #H/o Multiple fall, deconditioning   #Multiple Anaya removal by pt w/ trauma and hematuria   - Per family - he lives upstairs and was independent with most ADLs (bathing, walking, eating). This decline represents a significant change from baseline  -- trauma w/up negative    -- Fall precautions   -- Psychiatry consultation appreciated - will follow med recs, started abilify and depakote, added seroquel 25 mg at bedtime on 04/29 -> time changed to 6 pm daily.    #elevated liver enzymes likely DILI vs congestion -- resolved    #Constipation  - C/w bowel regimen     #Incidental CT scan findings:  - Focal area of subcortical white matter hypoattenuation in the left frontoparietal region with questionable associated cortical involvement. Findings are nonspecific could represent chronic infarct, focal white matter changes including chronic microvascular ischemic changes, focal gliosis, focal demyelination, If symptoms persist nonemergent outpatient brain MRI can be considered.  - Few ill-defined though nonaggressive appearing lucencies in the calvarium largest in the left frontal region.   - Few scattered lucencies throughout the vertebral column in conjunction with the above findings, nonspecific though can consider multiple myeloma  - if within GOC should be followed up as outpt     #Poor PO intake  staff d/w family who will decide on possibility of feeding tube    GOC: 5/9 d/w son who confirmed DNR/DNI and wants pt comfortable. Son agreed to CMO. Started Ativan and Morphine. D/c vitals, labs, VS, meds unless for comfort    #Code status: DNR/DNI      Disposition: CMO. Hospice c/s if remains stable.    My note supersedes the residents note should a discrepancy arise.    Chart and notes personally reviewed.  Care Discussed with Consultants/Other Providers/ Housestaff [ x] YES [ ] NO   Radiology, labs, old records personally reviewed.    discussed w/ housestaff, nursing, case management    Attestation Statements:    Attestation Statements:  Risk Statement (NON-critical care).     On this date of service, level of risk to patient is considered: High.     Due to: pt with illness causing risk to life or bodily fxn    Time-based billing (NON-critical care).     50 minutes spent on total encounter. The necessity of the time spent during the encounter on this date of service was due to:     time spent on review of labs, imaging studies, old records, obtaining history, personally examining patient, counselling and communicating with patient/ family, entering orders for medications/tests/etc, discussions with other health care providers, documentation in electronic health records, independent interpretation of labs, imaging/procedure results and care coordination.

## 2025-05-09 NOTE — PROVIDER CONTACT NOTE (OTHER) - SITUATION
lab unable to draw trops
patient was working with physical therapist at bedside , patient told physical therapist he felt weird in his cheat.
pt refused AM v/s despite education and encouragement
Patient bladder scanned best of three bladder scans shows 161ml of urine in bladder.
Pt become short of breath after moving from bed to chair with 2 person assist. Pt exhibits labored breathing,
patient bladder scanned best of three bladder scans shows 492 ml of urine in bladder. Patient has order to insert anaya at 1700

## 2025-05-09 NOTE — CONSULT NOTE ADULT - SUBJECTIVE AND OBJECTIVE BOX
Urology Consult Note:   Pt is a 89 yo M pmhx HTN a/w AMS. Hx obtained from chart and other collateral sources of On 4/19/25, pt was found to be retaining urine in the ED (631cc) with pt unable to void so anaya catheter was placed by ED staff. Patient was admitted to Medicine for further evaluation of AMS, lethargy and rib fx that were likely sustained from a fall a couple of weeks prior per chart. During this time, pt had remained confused and pulled on his catheter causing him to become hematuric which quickly resolved.   On 4/21 patient was given a TOV and passed when US showed a PVR of 23cc.   On 4/23 Per chart, patient had again developed urinary retention (bedside bladder scan showed a high PVR per chart though exact amount unknown at this time) so decision was made by primary team to place anaya catheter. Patient again developed confusion and pulled on his catheter again causing him to become hematuric so patient had been placed on restraints.  On 4/24 patient was found to have an acute right sided PE and had been started on AC. Remains on a 1:1 sit.   On 4/27 RN called  team for assistance with catheter irrigation. Anaya catheter at this time draining clear dark yellow urine without any blood or blood clots noted to be draining within the anaya catheter tubing. Irrigated anaya catheter with 150cc of SW without issues and left anaya catheter draining clear yellow urine without any clots noted in tubing.  Today, Patient noted again with gross hematuria likely from traumatic anaya from patient pulling on restraints. Per RN, patient was irrigated with 150cc of SW with some clots aspirated,  recalled for further evaluation. Anaya in place draining thin blood-tinged urine without clots.     PAST MEDICAL & SURGICAL HISTORY:  Hypertension    [ x ]  Due to altered mental status/intubation, subjective information was not able to be obtained from the patient. History was obtained to the extent possible from review of the chart and collateral sources of information.     MEDICATIONS  (STANDING):  brimonidine 0.2% Ophthalmic Solution 1 Drop(s) Both EYES two times a day  chlorhexidine 2% Cloths 1 Application(s) Topical daily  divalproex  milliGRAM(s) Oral <User Schedule>  divalproex  milliGRAM(s) Oral <User Schedule>  enoxaparin Injectable 80 milliGRAM(s) SubCutaneous every 12 hours  latanoprost 0.005% Ophthalmic Solution 1 Drop(s) Both EYES at bedtime  lisinopril 10 milliGRAM(s) Oral daily  melatonin 10 milliGRAM(s) Oral at bedtime  metoprolol succinate ER 25 milliGRAM(s) Oral daily  multivitamin 1 Tablet(s) Oral daily  polyethylene glycol 3350 17 Gram(s) Oral two times a day  QUEtiapine 25 milliGRAM(s) Oral <User Schedule>  senna 2 Tablet(s) Oral at bedtime  tamsulosin 0.8 milliGRAM(s) Oral at bedtime  thiamine 200 milliGRAM(s) Oral daily  timolol 0.5% Solution 1 Drop(s) Both EYES two times a day    MEDICATIONS  (PRN):  acetaminophen     Tablet .. 650 milliGRAM(s) Oral every 6 hours PRN Temp greater or equal to 38C (100.4F), Mild Pain (1 - 3)  aluminum hydroxide/magnesium hydroxide/simethicone Suspension 30 milliLiter(s) Oral every 4 hours PRN Dyspepsia  ARIPiprazole 2.5 milliGRAM(s) Oral <User Schedule> PRN agitation      Allergies: No Known Allergies    SOCIAL HISTORY: No illicit drug use    PHYSICAL EXAM:  Constitutional: NAD, well-developed  Back: No CVA ttp bilaterally  Resp: No accessory respiratory muscle use  Abd: Soft, NT/ND. No suprapubic ttp  : Uncircumcised, testicles x2 nottp and symmetric. +anaya in place draining pink tinged to clear urine without clots after irrigating with 150cc SW.   Neuro: Awake and confused   Psych: Normal mood, normal affect  Skin: Good color, non diaphoretic    Patient consented verbally to a pelvic examination/anaya placement with CHERRY Jackman present as a chaperone.     Vital Signs Last 24 Hrs  T(C): 36.2 (30 Apr 2025 12:08), Max: 36.7 (30 Apr 2025 05:17)  T(F): 97.2 (30 Apr 2025 12:08), Max: 98 (30 Apr 2025 05:17)  HR: 63 (30 Apr 2025 12:08) (63 - 92)  BP: 127/80 (30 Apr 2025 12:08) (116/71 - 127/80)  BP(mean): 86 (30 Apr 2025 05:17) (86 - 86)  RR: 18 (30 Apr 2025 12:08) (18 - 18)  SpO2: 99% (30 Apr 2025 12:08) (99% - 99%)        I&O's Summary    29 Apr 2025 07:01  -  30 Apr 2025 07:00  --------------------------------------------------------  IN: 100 mL / OUT: 400 mL / NET: -300 mL    30 Apr 2025 07:01  -  30 Apr 2025 16:18  --------------------------------------------------------  IN: 300 mL / OUT: 200 mL / NET: 100 mL        LABS:                        13.3   7.71  )-----------( 267      ( 30 Apr 2025 05:01 )             39.9     04-30  142  |  108  |  11  ----------------------------<  75  4.0   |  22  |  0.9    Ca    8.4      30 Apr 2025 05:01    TPro  6.3  /  Alb  3.4[L]  /  TBili  0.6  /  DBili  x   /  AST  67[H]  /  ALT  63[H]  /  AlkPhos  77  04-30        Urinalysis Basic - ( 30 Apr 2025 05:01 )  Color: x / Appearance: x / SG: x / pH: x  Gluc: 75 mg/dL / Ketone: x  / Bili: x / Urobili: x   Blood: x / Protein: x / Nitrite: x   Leuk Esterase: x / RBC: x / WBC x   Sq Epi: x / Non Sq Epi: x / Bacteria: x      
Neurology Consult      HPI:   89M PMH of HTN, hearing loss, and dementia a/f AMS. Per chart review family reported that "the patient was independent with most activities of daily living (ADLs) and functionally intact until approximately two weeks prior to admission, when they began noticing increased confusion and language difficulties. Additionally, family noted that six to eight weeks prior, the patient had started showing signs of disorientation, such as waking up at 12 to prepare breakfast after already eating earlier that morning. Moreover, family indicated that pt appetite decreased as well during those weeks. He was also increasingly forgetful, though the family attributed this to normal aging. No neurological or medical evaluation was sought at the time of these early symptoms. It was not until the recent fall and urinary tract infection that medical attention was pursued." At home prior to admission patient was A&Ox1 and mostly dependent on his son for his ADLs. In the past few days, staff/ family have seen a worsening in his mental status stating that his speech is more "gibberish". Neurology consulted for acute change in mental status. No family at bedside currently for collateral.      · BP Systolic	125 mm Hg  · BP Diastolic	73 mm Hg  · Heart Rate	 101 /min  · Respiration Rate (breaths/min)	18 /min  · Temp (F)	97.7 Degrees F  · Temp (C)	36.5 Degrees C  · Temp site	oral  · SpO2 (%)	96 %  · O2 Delivery/Oxygen Delivery Method	room air  · Temp at ED Arrival (C)	36.5 Degrees C    Labs: Hb 15.2> 13.2,  Troponin 48>40, UA : bloody (traumatic )     CT head:    Bones are diffusely osteopenic.    No evidence of acute compression deformity or facet subluxation.    Dens is intact. No thickening of the prevertebral soft tissues.    Multilevel degenerative changes.    Small scattered slightly ill-defined lucencies throughout the vertebral   column. For example posterior aspect of the dens left aspect of C2.    Heterogeneous left thyroid lobe goiter, partially imaged       (19 Apr 2025 03:48)      PAST MEDICAL & SURGICAL HISTORY:  Hypertension      Allergies    No Known Allergies            MEDICATIONS  (STANDING):  brimonidine 0.2% Ophthalmic Solution 1 Drop(s) Both EYES two times a day  chlorhexidine 2% Cloths 1 Application(s) Topical daily  divalproex  milliGRAM(s) Oral <User Schedule>  divalproex  milliGRAM(s) Oral <User Schedule>  enoxaparin Injectable 80 milliGRAM(s) SubCutaneous every 12 hours  latanoprost 0.005% Ophthalmic Solution 1 Drop(s) Both EYES at bedtime  lisinopril 10 milliGRAM(s) Oral daily  melatonin 10 milliGRAM(s) Oral at bedtime  metoprolol succinate ER 25 milliGRAM(s) Oral daily  multivitamin 1 Tablet(s) Oral daily  polyethylene glycol 3350 17 Gram(s) Oral two times a day  QUEtiapine 25 milliGRAM(s) Oral <User Schedule>  senna 2 Tablet(s) Oral at bedtime  tamsulosin 0.8 milliGRAM(s) Oral at bedtime  thiamine 200 milliGRAM(s) Oral daily  timolol 0.5% Solution 1 Drop(s) Both EYES two times a day    MEDICATIONS  (PRN):  acetaminophen     Tablet .. 650 milliGRAM(s) Oral every 6 hours PRN Temp greater or equal to 38C (100.4F), Mild Pain (1 - 3)  aluminum hydroxide/magnesium hydroxide/simethicone Suspension 30 milliLiter(s) Oral every 4 hours PRN Dyspepsia  ARIPiprazole 2.5 milliGRAM(s) Oral <User Schedule> PRN agitation      Review of systems:  as per HPI    Vital Signs Last 24 Hrs  T(C): 36.5 (06 May 2025 04:32), Max: 37.1 (05 May 2025 14:09)  T(F): 97.7 (06 May 2025 04:32), Max: 98.7 (05 May 2025 14:09)  HR: 82 (06 May 2025 04:32) (77 - 82)  BP: 118/63 (06 May 2025 04:32) (111/71 - 135/78)  BP(mean): 81 (06 May 2025 04:32) (81 - 97)  RR: 18 (06 May 2025 04:32) (18 - 18)  SpO2: 97% (06 May 2025 08:25) (95% - 100%)    Parameters below as of 06 May 2025 08:25  Patient On (Oxygen Delivery Method): nasal cannula, high flow  O2 Flow (L/min): 40  O2 Concentration (%): 40    Examination:  General:  Appearance is consistent with chronologic age.  No abnormal facies.   Cognitive/Language:  The patient is A&Ox1.  Language with mild dysarthria with normal naming.    Eyes:   EOMI w/o nystagmus.  No ptosis/weakness of eyelid closure.    Face:  no facial asymmetry.    Ears/Nose/Throat:  Hearing grossly intact b/l.     Motor examination:   Normal tone, bulk and range of motion.  No tenderness, twitching, tremors or involuntary movements.  Formal Muscle Strength Testing: (MRC grade R/L) 5/5 UE; 5/5 LE.  No observable drift.    Labs:   CBC Full  -  ( 05 May 2025 07:50 )  WBC Count : 11.33 K/uL  RBC Count : 4.40 M/uL  Hemoglobin : 14.0 g/dL  Hematocrit : 40.9 %  Platelet Count - Automated : 304 K/uL  Mean Cell Volume : 93.0 fL  Mean Cell Hemoglobin : 31.8 pg  Mean Cell Hemoglobin Concentration : 34.2 g/dL  Auto Neutrophil # : x  Auto Lymphocyte # : x  Auto Monocyte # : x  Auto Eosinophil # : x  Auto Basophil # : x  Auto Neutrophil % : x  Auto Lymphocyte % : x  Auto Monocyte % : x  Auto Eosinophil % : x  Auto Basophil % : x    05-05    138  |  104  |  19  ----------------------------<  91  3.5   |  26  |  1.1    Ca    9.2      05 May 2025 07:50    TPro  6.3  /  Alb  3.2[L]  /  TBili  1.1  /  DBili  x   /  AST  28  /  ALT  30  /  AlkPhos  84  05-05    LIVER FUNCTIONS - ( 05 May 2025 07:50 )  Alb: 3.2 g/dL / Pro: 6.3 g/dL / ALK PHOS: 84 U/L / ALT: 30 U/L / AST: 28 U/L / GGT: x             Urinalysis Basic - ( 05 May 2025 07:50 )    Color: x / Appearance: x / SG: x / pH: x  Gluc: 91 mg/dL / Ketone: x  / Bili: x / Urobili: x   Blood: x / Protein: x / Nitrite: x   Leuk Esterase: x / RBC: x / WBC x   Sq Epi: x / Non Sq Epi: x / Bacteria: x          Neuroimaging:  NCHCT:     05-06-25 @ 10:52      
PAST SURGICAL HISTORY:  History of appendectomy pt states he never had an appendectomy    
CC:  speaking gibberish    HPI:   89 yo M pmhx htn presenting to the ED for evaluation. pt son at bedside endorsing he has been more confused and speaking "gibberish". pt fell 2 weeks ago, diagnosed with 4 R sided rib fractures outpatient. pt also with urinary retention, seen in the ED earlier today and had anaya placed. Denies fever, chills, abd pain, chest pain, calf pain, nausea, vomiting, headache.     (19 Apr 2025 03:48)    PERTINENT PM/SXH:   Hypertension        FAMILY HISTORY:    None pertinent    ITEMS NOT CHECKED ARE NOT PRESENT    SOCIAL HISTORY:   Significant other/partner[ ]  Children[ ]  Judaism/Spirituality:  Substance hx:  [ ]   Tobacco hx:  [ ]   Alcohol hx: [ ]   Living Situation: [x ]Home  [ ]Long term care  [ ]Rehab [ ]Other  Home Services: [ ] HHA [ ] Visting RN [ ] Hospice  Occupation:  Home Opioid hx:  [ ] Y [ ] N [ x] I-Stop Reference No:    Reference #: [id number] - no meds     ADVANCE DIRECTIVES:     [ ] Full Code [ x] DNR  MOLST  [ ]  Living Will  [ ]   DECISION MAKER(s):  [ ] Health Care Proxy(s)  [x ] Surrogate(s)  [ ] Guardian           Name(s): Phone Number(s):  Children      BASELINE (I)ADL(s) (prior to admission):    [place]: [ ]Total  [ ] Moderate [ ]Dependent  Palliative Performance Status Version 2:         %    http://npcrc.org/files/news/palliative_performance_scale_[email or web address].pdf    Allergies    No Known Allergies    Intolerances    MEDICATIONS  (STANDING):  brimonidine 0.2% Ophthalmic Solution 1 Drop(s) Both EYES two times a day  chlorhexidine 2% Cloths 1 Application(s) Topical daily  divalproex  milliGRAM(s) Oral <User Schedule>  divalproex  milliGRAM(s) Oral <User Schedule>  dronabinol 2.5 milliGRAM(s) Oral two times a day  enoxaparin Injectable 80 milliGRAM(s) SubCutaneous every 12 hours  furosemide    Tablet 10 milliGRAM(s) Oral daily  latanoprost 0.005% Ophthalmic Solution 1 Drop(s) Both EYES at bedtime  lisinopril 10 milliGRAM(s) Oral daily  melatonin 10 milliGRAM(s) Oral at bedtime  metoprolol succinate ER 50 milliGRAM(s) Oral daily  polyethylene glycol 3350 17 Gram(s) Oral two times a day  QUEtiapine 25 milliGRAM(s) Oral <User Schedule>  senna 2 Tablet(s) Oral at bedtime  tamsulosin 0.8 milliGRAM(s) Oral at bedtime  timolol 0.5% Solution 1 Drop(s) Both EYES two times a day    MEDICATIONS  (PRN):  acetaminophen     Tablet .. 650 milliGRAM(s) Oral every 6 hours PRN Temp greater or equal to 38C (100.4F), Mild Pain (1 - 3)  aluminum hydroxide/magnesium hydroxide/simethicone Suspension 30 milliLiter(s) Oral every 4 hours PRN Dyspepsia  ARIPiprazole 2.5 milliGRAM(s) Oral <User Schedule> PRN agitation  LORazepam   Injectable 2 milliGRAM(s) IV Push every 4 hours PRN Agitation  morphine  - Injectable 2 milliGRAM(s) IV Push every 1 hour PRN agitation, air hunger    PRESENT SYMPTOMS: [x ]Unable to obtain due to poor mentation   Source if other than patient:  [ ]Family   [ ]Team     Pain: [ ]yes [ ]no  ALL PAIN ASSESSMENT COMPONENTS WERE ASKED ABOUT UNLESS OTHERWISE NOTED  QOL impact -   Location -                    Aggravating factors -  Quality -  Radiation -  Timing-  Severity (0-10 scale):  Minimal acceptable level (0-10 scale):     CPOT:  2  https://www.sccm.org/getattachment/mem45e84-8b2o-4p2n-9o5p-4134w4340t7u/Critical-Care-Pain-Observation-Tool-(CPOT)    PAIN AD Score:   [email or web address]://[email or web address]/painad.pdf (press ctrl +  left click to view)    Dyspnea:                           [ ]None[ ]Mild [ ]Moderate [ ]Severe     Respiratory Distress Observation Scale (RDOS): 2  A score of 0 to 2 signifies little or no respiratory distress, 3 signifies mild distress, scores 4 to 6 indicate moderate distress, and scores greater than 7 signify severe distress  [email or web address]/[email or web address]-iixnqplinkl-nmhquzkk-kqlnapeieuj-qilmp.pdf    Anxiety:                             [ ]None[ ]Mild [ ]Moderate [ ]Severe   Fatigue:                             [ ]None[ ]Mild [ ]Moderate [ ]Severe   Nausea:                             [ ]None[ ]Mild [ ]Moderate [ ]Severe   Loss of appetite:              [ ]None[ ]Mild [ ]Moderate [ ]Severe   Constipation:                    [ ]None[ ]Mild [ ]Moderate [ ]Severe    Other Symptoms:  [ ]All other review of systems negative     Palliative Performance Status Version 2:        20 %    [email or web address]_performance_[email or web address]    PHYSICAL EXAM:  Vital Signs Last 24 Hrs  T(C): 36.5 (08 May 2025 21:23), Max: 36.5 (08 May 2025 21:23)  T(F): 97.7 (08 May 2025 21:23), Max: 97.7 (08 May 2025 21:23)  HR: 58 (08 May 2025 21:23) (58 - 58)  BP: 102/67 (08 May 2025 21:23) (102/67 - 102/67)  BP(mean): 79 (08 May 2025 21:23) (79 - 79)  RR: 22 (09 May 2025 09:20) (18 - 22)  SpO2: 93% (09 May 2025 09:20) (93% - 99%)    Parameters below as of 09 May 2025 09:20  Patient On (Oxygen Delivery Method): nasal cannula, high flow  O2 Flow (L/min): 60  O2 Concentration (%): 100 I&O's Summary    08 May 2025 07:01  -  09 May 2025 07:00  --------------------------------------------------------  IN: 0 mL / OUT: 200 mL / NET: -200 mL        GENERAL:  [ ] No acute distress [x ]Lethargic  [ ]Unarousable  [ ]Verbal  [ ]Non-Verbal [ ]Cachexia    BEHAVIORAL/PSYCH:  [ ]Alert and Oriented x  [ ] Anxiety [ ] Delirium [ ] Agitation [ x] Calm   EYES: [ ] No scleral icterus [ ] Scleral icterus [ ] Closed  ENMT:  [ ]Dry mouth  [x ]No external oral lesions [ ] No external ear or nose lesions  CARDIOVASCULAR:  [ ]Regular [ ]Irregular [ ]Tachy [ ]Not Tachy  [ ]Braydon [ ] Edema [ ] No edema  PULMONARY:  [ x]Tachypnea  [ ]Audible excessive secretions [ ] No labored breathing [ ] labored breathing  GASTROINTESTINAL: [ ]Soft  [ ]Distended  [ ]Not distended [ ]Non tender [ ]Tender  MUSCULOSKELETAL: [ ]No clubbing [ ] clubbing  [ ] No cyanosis [ ] cyanosis  NEUROLOGIC: [ ]No focal deficits  [ ]Follows commands  [ ]Does not follow commands  [ x]Cognitive impairment  [ ]Dysphagia  [ ]Dysarthria  [ ]Paresis   SKIN: [ ] Jaundiced [ x] Non-jaundiced [ ]Rash [ ]No Rash [ ] Warm [ ] Dry  MISC/LINES: [ ] ET tube [ ] Trach [ ]NGT/OGT [ ]PEG [ ]Anaya    LABS: Interpreted by me    BMP from 5/7 shows sodium 148, CBC shows leukocytosis, LFTs grossly within normal limites        RADIOLOGY & ADDITIONAL STUDIES: Interpreted by me - WNL    < from: Xray Chest 1 View- PORTABLE-Urgent (Xray Chest 1 View- PORTABLE-Urgent .) (05.09.25 @ 10:37) >  IMPRESSION:    No radiographic evidence of acute cardiopulmonary disease.    < end of copied text >      EKG: Interpreted by me - RBBB, NSR    < from: 12 Lead ECG (04.28.25 @ 10:40) >    Ventricular Rate 79 BPM    Atrial Rate 79 BPM    P-R Interval 176 ms    QRS Duration 166 ms    Q-T Interval 460 ms    QTC Calculation(Bazett) 527 ms    P Axis 59 degrees    R Axis -71 degrees    T Axis 59 degrees    Diagnosis Line Normal sinus rhythm  Right bundle branch block  Left anterior fascicular block  *** Bifascicular block ***  Abnormal ECG    < end of copied text >      PROTEIN CALORIE MALNUTRITION PRESENT: [ ]mild [ ]moderate [ ]severe [ ]underweight [ ]morbid obesity  https://www.andeal.org/vault/2440/web/files/ONC/Table_Clinical%20Characteristics%20to%20Document%20Malnutrition-White%20JV%20et%20al%202012.pdf    Height (cm): 167.6 (04-29-25 @ 16:05)  Weight (kg): 79.4 (04-19-25 @ 02:51)  BMI (kg/m2): 28.3 (04-29-25 @ 16:05)  [ ]PPSV2 < or = to 30% [ ]significant weight loss  [ ]poor nutritional intake  [ ]anasarca      [ ]Artificial Nutrition      Palliative Care Spiritual/Emotional Screening Tool Question  Severity (0-4):                    OR                    [ x] Unable to determine. Will assess at later time if appropriate.  Score of 2 or greater indicates recommendation of Chaplaincy and/or SW referral  Chaplaincy Referral: [ ] Yes [ ] Refused [ ] Following     Caregiver Daniels:  [ ] Yes [ ] No    OR    [x ] Unable to determine. Will assess at later time if appropriate.  Social Work Referral [ ]  Patient and Family Centered Care Referral [ ]    Anticipatory Grief Present: [ ] Yes [ ] No    OR     [ x] Unable to determine. Will assess at later time if appropriate.  Social Work Referral [ ]  Patient and Family Centered Care Referral [ ]    Patient discussed with primary medical team MD  Palliative care education provided to patient and/or family

## 2025-05-09 NOTE — PROGRESS NOTE ADULT - SUBJECTIVE AND OBJECTIVE BOX
Patient is a 89y old  Male who presents with a chief complaint of hematuria (05 May 2025 06:55)    INTERVAL HPI/OVERNIGHT EVENTS: Patient was examined and seen at bedside. This morning pt is resting in bed and staff reporting increased O2 reqs. Confused. Still not eating well as per staff. Taking off O2. +Hematuria.    InitialHPI:   89 yo M pmhx htn presenting to the ED for evaluation. pt son at bedside endorsing he has been more confused and speaking "gibberish". pt fell 2 weeks ago, diagnosed with 4 R sided rib fractures outpatient. pt also with urinary retention, seen in the ED earlier today and had anaya placed. Denies fever, chills, abd pain, chest pain, calf pain, nausea, vomiting, headache.      · BP Systolic	125 mm Hg  · BP Diastolic	73 mm Hg  · Heart Rate	 101 /min  · Respiration Rate (breaths/min)	18 /min  · Temp (F)	97.7 Degrees F  · Temp (C)	36.5 Degrees C  · Temp site	oral  · SpO2 (%)	96 %  · O2 Delivery/Oxygen Delivery Method	room air  · Temp at ED Arrival (C)	36.5 Degrees C    Labs: Hb 15.2> 13.2,  Troponin 48>40, UA : bloody (traumatic )     CT head:    Bones are diffusely osteopenic.    No evidence of acute compression deformity or facet subluxation.    Dens is intact. No thickening of the prevertebral soft tissues.    Multilevel degenerative changes.    Small scattered slightly ill-defined lucencies throughout the vertebral   column. For example posterior aspect of the dens left aspect of C2.    Heterogeneous left thyroid lobe goiter, partially imaged       (19 Apr 2025 03:48)    PAST MEDICAL & SURGICAL HISTORY:  Hypertension          General: restless, AAOx1, chronically ill appearing, +NC O2, [place]  HEENT:  no LAD  CV: S1 S2  Resp: decreased breath sounds at bases  GI: NT/ND/S +BS  MS: no clubbing/cyanosis/edema, + pulses b/l  Neuro: moves all extremities          Home Medications:  brimonidine 0.2% ophthalmic solution: 1 drop(s) to each affected eye 2 times a day (21 Apr 2025 09:54)  enoxaparin: 40 milligram(s) subcutaneous once a day (21 Apr 2025 09:54)  latanoprost 0.005% ophthalmic solution: 1 drop(s) to each affected eye once a day (at bedtime) (21 Apr 2025 09:54)  NIFEdipine 30 mg oral tablet, extended release: 1 tab(s) orally once a day (21 Apr 2025 09:54)  tamsulosin 0.4 mg oral capsule: 1 cap(s) orally once a day (at bedtime) (21 Apr 2025 09:54)  timolol maleate 0.5% ophthalmic solution: 1 drop(s) to each affected eye 2 times a day (21 Apr 2025 09:54)    MEDICATIONS  (STANDING):  chlorhexidine 2% Cloths 1 Application(s) Topical daily  divalproex  milliGRAM(s) Oral <User Schedule>  divalproex  milliGRAM(s) Oral <User Schedule>  glycopyrrolate Injectable 0.2 milliGRAM(s) IV Push every 6 hours  morphine  - Injectable 4 milliGRAM(s) IV Push every 4 hours    MEDICATIONS  (PRN):  LORazepam   Injectable 1 milliGRAM(s) IV Push every 4 hours PRN agitation/anxiety  morphine  - Injectable 2 milliGRAM(s) IV Push every 1 hour PRN agitation, air hunger    Vital Signs Last 24 Hrs  T(C): 36.5 (08 May 2025 21:23), Max: 36.5 (08 May 2025 21:23)  T(F): 97.7 (08 May 2025 21:23), Max: 97.7 (08 May 2025 21:23)  HR: 58 (08 May 2025 21:23) (58 - 58)  BP: 102/67 (08 May 2025 21:23) (102/67 - 102/67)  BP(mean): 79 (08 May 2025 21:23) (79 - 79)  RR: 22 (09 May 2025 09:20) (18 - 22)  SpO2: 93% (09 May 2025 09:20) (93% - 99%)    Parameters below as of 09 May 2025 09:20  Patient On (Oxygen Delivery Method): nasal cannula, high flow  O2 Flow (L/min): 60  O2 Concentration (%): 100  CAPILLARY BLOOD GLUCOSE        LABS:                    ABG - ( 09 May 2025 09:03 )  pH, Arterial: 7.52  pH, Blood: x     /  pCO2: 26    /  pO2: 54    / HCO3: 21    / Base Excess: -0.3  /  SaO2: 89.4                    Consultant Notes Reviewed:  [x ] YES  [ ] NO  Care Discussed with Consultants/Other Providers/ Housestaff [ x] YES  [ ] NO  Radiology, labs, EKGs, new studies personally reviewed.

## 2025-05-09 NOTE — PROVIDER CONTACT NOTE (OTHER) - DATE AND TIME:
23-Apr-2025 17:00
23-Apr-2025 17:11
23-Apr-2025 13:28
23-Apr-2025 08:23
09-May-2025 09:31
19-Apr-2025 10:03

## 2025-05-09 NOTE — PROGRESS NOTE ADULT - ASSESSMENT
89 yo M pmhx htn presenting to the ED for evaluation. pt son at bedside endorsing he has been more confused and speaking "gibberish". pt fell 2 weeks ago, diagnosed with 4 Right sided rib fractures outpatient. Pt also with urinary retention. Pt had anaya placed in the ER on admission and he removed it and it was replaced again and then he removed again. Now with some light hematuria.   Psychiatry consulted to assist with medication management for Dementia with Agitation vs Delirium as QTc > 500 and can not use usual antipsychotics. On 4/23 pt endorsed some chest pain so transferred to [address] [place] [address], found to have acute PE.     #New onset HFrEF (April 2025 TTE EF 30-35%)  #New hypoxic respiratory failure   #NSVT   #Acute PE  #Urinary retention -- started at home  #Gross Hematuria (traumatic - patient pulled anyaa) -- resolved  #Worsening dementia   #Agitation   #H/o Multiple fall, deconditioning   #Multiple Anaya removal by pt w/ trauma and hematuria   #elevated liver enzymes likely DILI vs congestion -- resolved  #Constipation  #Incidental CT scan findings:      Patient is currently CMO. Palliative consulted. MOLST form signed.

## 2025-05-09 NOTE — PROGRESS NOTE ADULT - SUBJECTIVE AND OBJECTIVE BOX
24H events:    Patient is a 89y old Male who presents with a chief complaint of hematuria (08 May 2025 15:14)    Primary diagnosis of Confusion    Today is hospital day 20d. This morning patient was seen and examined at bedside, resting comfortably in bed.    Patient was desatting in AM on HFNC and NRFM to low 80s. Patient was dyspneic. Family contacted     Code Status: CMO    PAST MEDICAL & SURGICAL HISTORY  Hypertension      SOCIAL HISTORY:  Social History:      ALLERGIES:  No Known Allergies    MEDICATIONS:  STANDING MEDICATIONS  brimonidine 0.2% Ophthalmic Solution 1 Drop(s) Both EYES two times a day  chlorhexidine 2% Cloths 1 Application(s) Topical daily  divalproex  milliGRAM(s) Oral <User Schedule>  divalproex  milliGRAM(s) Oral <User Schedule>  dronabinol 2.5 milliGRAM(s) Oral two times a day  enoxaparin Injectable 80 milliGRAM(s) SubCutaneous every 12 hours  furosemide    Tablet 10 milliGRAM(s) Oral daily  latanoprost 0.005% Ophthalmic Solution 1 Drop(s) Both EYES at bedtime  lisinopril 10 milliGRAM(s) Oral daily  melatonin 10 milliGRAM(s) Oral at bedtime  metoprolol succinate ER 50 milliGRAM(s) Oral daily  polyethylene glycol 3350 17 Gram(s) Oral two times a day  QUEtiapine 25 milliGRAM(s) Oral <User Schedule>  senna 2 Tablet(s) Oral at bedtime  tamsulosin 0.8 milliGRAM(s) Oral at bedtime  timolol 0.5% Solution 1 Drop(s) Both EYES two times a day    PRN MEDICATIONS  acetaminophen     Tablet .. 650 milliGRAM(s) Oral every 6 hours PRN  aluminum hydroxide/magnesium hydroxide/simethicone Suspension 30 milliLiter(s) Oral every 4 hours PRN  ARIPiprazole 2.5 milliGRAM(s) Oral <User Schedule> PRN  LORazepam   Injectable 2 milliGRAM(s) IV Push every 4 hours PRN  morphine  - Injectable 2 milliGRAM(s) IV Push every 1 hour PRN    VITALS:   T(F): 97.7  HR: 58  BP: 102/67  RR: 22  SpO2: 93%    PHYSICAL EXAM:  GENERAL:   (  ) NAD, lying in bed comfortably     (  ) obtunded     (  ) lethargic     (  ) somnolent    HEAD:   (  ) Atraumatic     (  ) hematoma     (  ) laceration (specify location:       )     NECK:  (  ) Supple     (  ) neck stiffness     (  ) nuchal rigidity     (  )  no JVD     (  ) JVD present ( -- cm)    HEART:  Rate -->     (  ) normal rate     (  ) bradycardic     (  ) tachycardic  Rhythm -->     (  ) regular     (  ) regularly irregular     (  ) irregularly irregular  Murmurs -->     (  ) normal s1s2     (  ) systolic murmur     (  ) diastolic murmur     (  ) continuous murmur      (  ) S3 present     (  ) S4 present    LUNGS:   (  )Unlabored respirations     (  ) tachypnea  (  ) B/L air entry     (  ) decreased breath sounds in:  (location     )    (  ) no adventitious sound     (  ) crackles     (  ) wheezing      (  ) rhonchi      (specify location:       )  (  ) chest wall tenderness (specify location:       )    ABDOMEN:   (  ) Soft     (  ) tense   |   (  ) nondistended     (  ) distended   |   (  ) +BS     (  ) hypoactive bowel sounds     (  ) hyperactive bowel sounds  (  ) nontender     (  ) RUQ tenderness     (  ) RLQ tenderness     (  ) LLQ tenderness     (  ) epigastric tenderness     (  ) diffuse tenderness  (  ) Splenomegaly      (  ) Hepatomegaly      (  ) Jaundice     (  ) ecchymosis     EXTREMITIES:  (  ) Normal     (  ) Rash     (  ) ecchymosis     (  ) varicose veins      (  ) pitting edema     (  ) non-pitting edema   (  ) ulceration     (  ) gangrene:     (location:     )    NERVOUS SYSTEM:    (  ) A&Ox3     (  ) confused     (  ) lethargic  CN II-XII:     (  ) Intact     (  ) deficits found     (Specify:     )   Upper extremities:     (  ) no sensorimotor deficits     (  ) weakness     (  ) loss of proprioception/vibration     (  ) loss of touch/temperature (specify:    )  Lower extremities:     (  ) no sensorimotor deficits     (  ) weakness     (  ) loss of proprioception/vibration     (  ) loss of touch/temperature (specify:    )    SKIN:   (  ) No rashes or lesions     (  ) maculopapular rash     (  ) pustules     (  ) vesicles     (  ) ulcer     (  ) ecchymosis     (specify location:     )    AMPAC score:    (  ) Indwelling Flores Catheter:   Date insterted:    Reason (  ) Critical illness     (  ) urinary retention    (  ) Accurate Ins/Outs Monitoring     (  ) CMO patient    (  ) Central Line:   Date inserted:  Location: (  ) Right IJ     (  ) Left IJ     (  ) Right Fem     (  ) Left Fem    (  ) SPC        (  ) pigtail       (  ) PEG tube       (  ) colostomy       (  ) jejunostomy  (  ) U-Dall    LABS:              ABG - ( 09 May 2025 09:03 )  pH, Arterial: 7.52  pH, Blood: x     /  pCO2: 26    /  pO2: 54    / HCO3: 21    / Base Excess: -0.3  /  SaO2: 89.4                      RADIOLOGY:           24H events:    Patient is a 89y old Male who presents with a chief complaint of hematuria (08 May 2025 15:14)    Primary diagnosis of Confusion    Today is hospital day 20d. This morning patient was seen and examined at bedside, resting comfortably in bed.    Patient was desatting in AM on HFNC and NRFM to low 80s. Patient was dyspneic. Family contacted and informed of deterioration. Discussed GOCs with son Gurjit (HCP) and decision for CMO.     Code Status: CMO    PAST MEDICAL & SURGICAL HISTORY  Hypertension      SOCIAL HISTORY:  Social History:      ALLERGIES:  No Known Allergies    MEDICATIONS:  STANDING MEDICATIONS  brimonidine 0.2% Ophthalmic Solution 1 Drop(s) Both EYES two times a day  chlorhexidine 2% Cloths 1 Application(s) Topical daily  divalproex  milliGRAM(s) Oral <User Schedule>  divalproex  milliGRAM(s) Oral <User Schedule>  dronabinol 2.5 milliGRAM(s) Oral two times a day  enoxaparin Injectable 80 milliGRAM(s) SubCutaneous every 12 hours  furosemide    Tablet 10 milliGRAM(s) Oral daily  latanoprost 0.005% Ophthalmic Solution 1 Drop(s) Both EYES at bedtime  lisinopril 10 milliGRAM(s) Oral daily  melatonin 10 milliGRAM(s) Oral at bedtime  metoprolol succinate ER 50 milliGRAM(s) Oral daily  polyethylene glycol 3350 17 Gram(s) Oral two times a day  QUEtiapine 25 milliGRAM(s) Oral <User Schedule>  senna 2 Tablet(s) Oral at bedtime  tamsulosin 0.8 milliGRAM(s) Oral at bedtime  timolol 0.5% Solution 1 Drop(s) Both EYES two times a day    PRN MEDICATIONS  acetaminophen     Tablet .. 650 milliGRAM(s) Oral every 6 hours PRN  aluminum hydroxide/magnesium hydroxide/simethicone Suspension 30 milliLiter(s) Oral every 4 hours PRN  ARIPiprazole 2.5 milliGRAM(s) Oral <User Schedule> PRN  LORazepam   Injectable 2 milliGRAM(s) IV Push every 4 hours PRN  morphine  - Injectable 2 milliGRAM(s) IV Push every 1 hour PRN    VITALS:   T(F): 97.7  HR: 58  BP: 102/67  RR: 22  SpO2: 93%    PHYSICAL EXAM:  GEN: No acute distress  LUNGS: Clear to auscultation bilaterally   HEART: S1/S2 present. RRR.   ABD/ GI: Soft, non-tender, non-distended. Bowel sounds present  EXT: NC/NC/NE/2+PP/BOLES  NEURO: confused-- hard of hearing  Flores: 5/7/25    LABS:              ABG - ( 09 May 2025 09:03 )  pH, Arterial: 7.52  pH, Blood: x     /  pCO2: 26    /  pO2: 54    / HCO3: 21    / Base Excess: -0.3  /  SaO2: 89.4                      RADIOLOGY:

## 2025-05-09 NOTE — CONSULT NOTE ADULT - ASSESSMENT
89 yo M pmhx htn presenting to the ED for evaluation. pt son at bedside endorsing he has been more confused and speaking "gibberish".  Patient with new onset HFrEF, new hypoxic respiratory failure, acute PE, gross hematuria. Patent's family decided on CMO earlier today.  Palliative care consulted for GOC and symptom management.    Patient seen at bedside. He was awake but unable to participate in exam.  Primary team spoke with son Gurjit earlier today who agreed to comfort measures only, but did not discuss HFNC removal.  Called and spoke with son Gurjit. Palliative care introduced. He was able to discuss the patient's poor overall prognosis and clinical deterioration, and confirmed comfort measures only. We discussed HFNC removal in the setting of CMO.  Gurjit wishes to pursue comfort measures only with HFNC. All questions answered.    Patient placed on PRN meds earlier in the day.  Received a call later in the day from bedside nurse. Patient agitated and complaining of chest discomfort after multiple PRNs. Will start standing opioids and adjust lorazepam    Plan  -DNR/DNI  -Comfort measures only with HFNC removal  -No additional IVF, artificial nutrition, blood draws, vital signs, pressors, antibiotics, escalation of oxygen, escalation of care  -Comfort feeds OK  -start morphine 4mg IV q4h ATC  -change PRN morphine to 4mg IV q1h PRN for pain/dyspnea  -change lorazepam to 1mg IV q4h PRN for anxiety/agitation  -glycopyrrolate 0.2mg IV q6h ATC  -will follow      Please call [phone] with questions or concerns 24/7.

## 2025-05-09 NOTE — PROVIDER CONTACT NOTE (OTHER) - REASON
Patient desaturating
bladder scan and anaya insertion and urine color.
patient felt weird when working with PT
bladder scan
blood work
pt refused AM v/s
Physical Exam    Constitutional: No acute distress.   Head: Atraumatic normocephalic   Eyes: Conjunctiva pink, Sclera clear, PERRLA, EOMI.  ENT: No sinus tenderness. No nasal discharge. No oropharyngeal erythema, edema, or exudates. Uvula midline.   Cardiovascular: Regular rate, regular rhythm. No noted murmurs rubs or gallops.  Respiratory: unlabored respiratory effort, clear to auscultation bilaterally no wheezing, rales or rhonchi  Gastrointestinal: Normal bowel sounds. soft, non distended, non-tender abdomen.   Musculoskeletal: supple neck, no midline tenderness.   Integumentary: warm, dry, no rash  Neurologic: awake, alert, oriented x 3. right leg 3/5 strength left leg 5/5 strength. No facial deformity. no sensory deficits

## 2025-05-10 NOTE — PROGRESS NOTE ADULT - ASSESSMENT
89 yo M pmhx htn presenting to the ED for evaluation. pt son at bedside endorsing he has been more confused and speaking "gibberish". pt fell 2 weeks ago, diagnosed with 4 Right sided rib fractures outpatient. Pt also with urinary retention. Pt had anaya placed in the ER on admission and he removed it and it was replaced again and then he removed again. Now with some light hematuria.   Psychiatry consulted to assist with medication management for Dementia with Agitation vs Delirium as QTc > 500 and can not use usual antipsychotics. On 4/23 pt endorsed some chest pain so transferred to [address] [place] [address], found to have acute PE.     #New onset HFrEF (April 2025 TTE EF 30-35%)  #New hypoxic respiratory failure   #NSVT   #Acute PE  #Urinary retention -- started at home  #Gross Hematuria (traumatic - patient pulled anaya) -- resolved  #Worsening dementia   #Agitation   #H/o Multiple fall, deconditioning   #Multiple Anaya removal by pt w/ trauma and hematuria   #elevated liver enzymes likely DILI vs congestion -- resolved  #Constipation  #Incidental CT scan findings:      Patient is currently CMO. Palliative consulted, recs noted. MOLST form signed.

## 2025-05-10 NOTE — PROGRESS NOTE ADULT - ATTENDING COMMENTS
89 yo M pmhx htn presenting to the ED for evaluation. pt son at bedside endorsing he has been more confused and speaking "gibberish". pt fell 2 weeks ago, diagnosed with 4 Right sided rib fractures outpatient. Pt also with urinary retention. Pt had anaya placed in the ER on admission and he removed it and it was replaced again and then he removed again. Now with some light hematuria.   Psychiatry consulted to assist with medication management for Dementia with Agitation vs Delirium as QTc > 500 and can not use usual antipsychotics. On 4/23 pt endorsed some chest pain so transferred to  tele, found to have acute PE.       # New onset HFrEF   - TTE: Global LV systolic function was moderately decreased. Left ventricular ejection fraction, by visual estimation, is 30 to 35%.   - start GDMT --> introduced losartan and metoprolol 4/27  - discontinued nifedipine and start ACEI and BB   - not a candidate for LifeVest or AICD due to age, dementia and poor functional status   - case discussed with cardio fellow. patient is not a candidate for ischemic work up. can check TSH and thiamine levels.  consider official cardio consult if any other questions.    # NSVT   - started BB  - keep K >4 and Mg > 2   - keep on tele     # Acute low risk PE  - CTA noted. PE, no right heart strain  - on RA  - Duplex negative  - c/w Lovenox therapeutic, will need to transition to DOAC on dc though will need good dispo as pt has hx of recurrent falls, ideally nursing home, would not be safe on DOAC at home     # Urinary retention started at home  # Gross Hematuria ( traumatic - patient pulled anaya) - resolved   - Possible causes of obstruction include BPH or constipation  - c/w Flomax   - US w no hydro or obstruction   - anaya replaced   - c/w mittens     # Worsening dementia   # Agitation   # H/o Multiple fall, deconditioning   # Multiple Anaya removal by pt w/ trauma and hematuria   - Per family - he lives upstairs and was independant with most ADLs (bathing, walking, eating). This decline represents a significant change from baseline  -- trauma w/up negative    -- Fall precautions   -- Psychiatry consultation appreciated - will follow med recs, started abilify and depakote, add seroquel 25 mg at bedtime.  -- currently on 1:1 --> d/c 1:1 if patient comfortable tomorrow.    # Constipation  - C/w bowel regimen     # incidental CT scan findings:  - Focal area of subcortical white matter hypoattenuation in the left frontoparietal region with questionable associated cortical involvement.    Findings are nonspecific could represent chronic infarct, focal white matter changes including chronic microvascular ischemic changes, focal   gliosis, focal demyelination, If symptoms persist nonemergent outpatient brain MRI can be considered.  -Few ill-defined though nonaggressive appearing lucencies in the calvarium largest in the left frontal region.   - Few scattered lucencies throughout the vertebral column in conjunction with the above findings, nonspecific though can consider multiple myeloma  - if within goc as outpt     DVT prophylaxis Lovenox     DNR/DNI    Pending: SNF placement, needs to be off 1:1
See resident note for detail briefly: 89 yo man with PMH of HTN and dementia presented with AMS and s/p fall admitted for HFrEF, AUR, dementia with behavioral problem, acute PE and NSVT. Patient remained agitated requiring close monitoring. Intermittent agitation likely 2/2 dementia with behavior problem, continue close monitoring, seen by psych recs appreciated. New onset fo HFrEF, continue GDMT, not a candidate for ischemic workup as per as per cardiology. NSVT, continue BB, replete electrolytes as needed. Acute PE, will hold AC given hematuria, will discuss with family about risk and benefit of AC and will switch to DOAC once hematuria resolved.  AUR, continue with Flores, tamsulosin. Hematuria likely from Flores trauma and AC use, will hold AC for now, continue to monitor H/H, seen by urology no acute intervention  Pending: SNF placement, improvement in agitation.
See resident note for detail briefly: 89 yo man with PMH of HTN and dementia presented with AMS and s/p fall admitted for HFrEF, AUR, dementia with behavioral problem, acute PE and NSVT. Patient remained agitated requiring close monitoring. Intermittent agitation likely 2/2 dementia with behavior problem, continue close monitoring, seen by psych recs appreciated. New onset fo HFrEF, continue GDMT, not a candidate for ischemic workup as per as per cardiology. NSVT, continue BB, replete electrolytes as needed. Acute PE, continue AC, will discuss with family about risk and benefit of AC and will switch to DOAC, AUR, continue with Flores, tamsulosin, noted some hematuria likely from Flores trauma, continue to monitor H/H, seen by urology recs appreciated.  Pending: SNF placement, improvement in agitation.
See resident note for detail briefly: 89 yo man with PMH of HTN and dementia presented with AMS and s/p fall admitted for HFrEF, AUR, dementia with behavioral problem, acute PE and NSVT. Patient remained agitated requiring close monitoring. Intermittent agitation likely 2/2 dementia with behavior problem, continue close monitoring, seen by psych recs appreciated. New onset fo HFrEF, continue GDMT, not a candidate for ischemic workup as per as per cardiology. NSVT, continue BB, replete electrolytes as needed. Acute PE, will hold AC given hematuria, will discuss with family about risk and benefit of AC and will switch to DOAC once hematuria resolved.  AUR, continue with Flores, tamsulosin. Hematuria likely from Flores trauma and AC use, improving, will continue to hold AC for now, continue to monitor H/H, seen by urology no acute intervention  Pending: SNF placement, improvement in agitation. Plan of care discussed with daughter by the bedside in detail.
patient seen and examined earlier today on rounds with residents and primary nurse.  patient is currently on comfort measures per family after discussion of goals of care and palliative consult.  patient is 89 y/o with dementia.  also with PE, multiple falls at home.  today patient looks comfortable.  resting quietly.  not easily arousable, but not grimacing or calling out.  rest of A & P as noted in resident note.
89 yo M pmhx htn presenting to the ED for evaluation. pt son at bedside endorsing he has been more confused and speaking "gibberish". pt fell 2 weeks ago, diagnosed with 4 Right sided rib fractures outpatient. Pt also with urinary retention. Pt had anaya placed in the ER on admission and he removed it and it was replaced again and then he removed again. Now with some light hematuria.   Psychiatry consulted to assist with medication management for Dementia with Agitation vs Delirium as QTc > 500 and can not use usual antipsychotics. On 4/23 pt endorsed some chest pain so transferred to  tele, found to have acute PE.     # New onset HFrEF   - TTE: Global LV systolic function was moderately decreased. Left ventricular ejection fraction, by visual estimation, is 30 to 35%.   - start GDMT --> introduced losartan and metoprolol 4/27  - discontinued nifedipine and start ACEI and BB   - not a candidate for LifeVest or AICD due to age, dementia and poor functional status   - case discussed with cardio fellow. patient is not a candidate for ischemic work up. TSH = wnl, thiamine level ordered. Consider official cardio consult if any other questions.    # NSVT   - started BB  - keep K >4 and Mg > 2   - keep on tele     # Acute low risk PE  - CTA noted. PE, no right heart strain  - on RA  - Duplex negative  - c/w Lovenox therapeutic, will need to transition to DOAC on dc though will need good dispo as pt has hx of recurrent falls, ideally nursing home, would not be safe on DOAC at home     # Urinary retention started at home  # Gross Hematuria ( traumatic - patient pulled anaya) - resolved   - Possible causes of obstruction include BPH or constipation  - c/w Flomax   - US w no hydro or obstruction   - anaya replaced   - c/w mittens     # Worsening dementia   # Agitation   # H/o Multiple fall, deconditioning   # Multiple Anaya removal by pt w/ trauma and hematuria   - Per family - he lives upstairs and was independant with most ADLs (bathing, walking, eating). This decline represents a significant change from baseline  -- trauma w/up negative    -- Fall precautions   -- Psychiatry consultation appreciated - will follow med recs, started abilify and depakote, added seroquel 25 mg at bedtime on 04/29 -> time changed to 6 pm daily.  -- on bilateral mittens -> can d/c if patient is comfortable tomorrow.    #Transaminitis  - ? 2/2 medications  - monitor LFTs --> if trending up adjust Depakote dose and further w/up including RUQ sono    # Constipation  - C/w bowel regimen     # incidental CT scan findings:  - Focal area of subcortical white matter hypoattenuation in the left frontoparietal region with questionable associated cortical involvement.    Findings are nonspecific could represent chronic infarct, focal white matter changes including chronic microvascular ischemic changes, focal   gliosis, focal demyelination, If symptoms persist nonemergent outpatient brain MRI can be considered.  -Few ill-defined though nonaggressive appearing lucencies in the calvarium largest in the left frontal region.   - Few scattered lucencies throughout the vertebral column in conjunction with the above findings, nonspecific though can consider multiple myeloma  - if within goc as outpt     DVT prophylaxis Lovenox     DNR/DNI    Pending: SNF placement, improvement in agitation.
Patient seen and examined. Patient alert and oriented to self only. Pleasant. Gross hematuria in Anaya bag.   Continue to flush anaya qshift. Urology f/u. C/w AC for now. C/w Flomax.   Rest of plan as above.
89 yo M pmhx htn presenting to the ED for evaluation. pt son at bedside endorsing he has been more confused and speaking "gibberish". pt fell 2 weeks ago, diagnosed with 4 Right sided rib fractures outpatient. Pt also with urinary retention. Pt had anaya placed in the ER on admission and he removed it and it was replaced again and then he removed again. Now with some light hematuria.   Psychiatry consulted to assist with medication management for Dementia with Agitation vs Delirium as QTc > 500 and can not use usual antipsychotics.      # Prolong QTc / Chest Pain possibly   - EKG w/ incomplete RBBB and Bradycardia  - Check: D-dimer   - Check: TTE (none in sunrise)  - Check: Duplex legs   - At this time not desaturating and pain resolved - no need for CTA at this time complete above w/up   - Transfer to Telemetry     # worsening dementia   # reported Agitation   # H/o Multiple fall, deconditioning   # Multiple anaya removal by pt w/ trauma and light hematuria   -- trauma w/up negative    -- Fall precautions   -- Gabapentin 100mg tid   -- Psychiatry consultation appreciated   -- started - Ativan 0.5 mg q8 prn, Depakote 250 q12, Abilify 2.5 q8 PRN    # Urinary retention started at home  # Hematuria ( traumatic - patient pulled anaya)  - c/w Flomax   - US w no hydro or obstruction   - May need anaya   - IBC q6h for Vol > 300cc   - If need will need mittents to discourage from pulling out anaya if replaced     # incidental CT scan findings:    --  Focal area of subcortical white matter hypoattenuation in the left frontoparietal region with questionable associated cortical involvement.    Findings are nonspecific could represent chronic infarct, focal white matter changes including chronic microvascular ischemic changes, focal   gliosis, focal demyelination, If symptoms persist nonemergent outpatient brain MRI can be considered.  -- Few ill-defined though nonaggressive appearing lucencies in the calvarium largest in the left frontal region.   -- Few scattered lucencies throughout the vertebral column in conjunction with the above findings, nonspecific though can consider multiple myeloma if within goc as outpt       HTN   -c/w Nifedipine XL 30mg    DVT prophylaxis lovenox     DNR/DNI    - PT/ Rehab eval   -  / CM to assess     Dispo: Acute / Tx 3C Telemetry f/u Cm/Sw/Retention and MS

## 2025-05-10 NOTE — PROGRESS NOTE ADULT - SUBJECTIVE AND OBJECTIVE BOX
24H events:    Patient is a 89y old Male who presents with a chief complaint of hematuria (09 May 2025 19:16)    Primary diagnosis of Confusion  Today is hospital day 21d. This morning patient was seen and examined at bedside, resting comfortably in bed.    No acute or major events overnight.    Code Status: CMO    PAST MEDICAL & SURGICAL HISTORY  Hypertension      SOCIAL HISTORY:  Social History:      ALLERGIES:  No Known Allergies    MEDICATIONS:  STANDING MEDICATIONS  chlorhexidine 2% Cloths 1 Application(s) Topical daily  divalproex  milliGRAM(s) Oral <User Schedule>  divalproex  milliGRAM(s) Oral <User Schedule>  glycopyrrolate Injectable 0.2 milliGRAM(s) IV Push every 6 hours  morphine  - Injectable 4 milliGRAM(s) IV Push every 4 hours    PRN MEDICATIONS  LORazepam   Injectable 1 milliGRAM(s) IV Push every 4 hours PRN  morphine  - Injectable 2 milliGRAM(s) IV Push every 1 hour PRN    VITALS:   T(F): --  HR: --  BP: --  RR: 22  SpO2: 93%    PHYSICAL EXAM:  Seems comfortable, not arousable    LABS:              ABG - ( 09 May 2025 09:03 )  pH, Arterial: 7.52  pH, Blood: x     /  pCO2: 26    /  pO2: 54    / HCO3: 21    / Base Excess: -0.3  /  SaO2: 89.4                      RADIOLOGY:

## 2025-05-11 NOTE — PROGRESS NOTE ADULT - SUBJECTIVE AND OBJECTIVE BOX
patient seen and examined earlier today with primary nurse.  patient is currently on comfort measures per family after discussion of goals of care and palliative consult.  patient is 89 y/o with dementia.  also with PE, multiple falls at home.  today patient looks comfortable.  resting quietly.  not easily arousable, but not grimacing or calling out.  on round the clock morphine every 4 hours.  will decrease to every 6 hours since patient is well sedated and looks comfortable.

## 2025-05-11 NOTE — PROGRESS NOTE ADULT - NUTRITIONAL ASSESSMENT
This patient has been assessed with a concern for Malnutrition and has been determined to have a diagnosis/diagnoses of Severe protein-calorie malnutrition.    This patient is being managed with:   Diet DASH/TLC-  Sodium & Cholesterol Restricted  Supplement Feeding Modality:  Oral  Ensure Plus High Protein Cans or Servings Per Day:  1       Frequency:  Two Times a day  Entered: Apr 29 2025  3:53PM  

## 2025-05-12 NOTE — PROGRESS NOTE ADULT - TIME BILLING
time spent on review of labs, imaging studies, old records, obtaining history, personally examining patient, counselling and communicating with patient, entering orders for medications/tests/etc, discussions with other health care providers, documentation in electronic health records, independent interpretation of labs, imaging/procedure results and care coordination.
time spent on review of labs, imaging studies, old records, obtaining history, personally examining patient, counselling and communicating with patient, entering orders for medications/tests/etc, discussions with other health care providers, documentation in electronic health records, independent interpretation of labs, imaging/procedure results and care coordination.
40 minutes spent to complete patient's bedside assessment, review medical chart, discuss medical plan of care with covering medical team with >50% of time spent face to face with patient, discussion with patient/family and/or coordination of care.
40 minutes spent to complete patient's bedside assessment, review medical chart, discuss medical plan of care with covering medical team with >50% of time spent face to face with patient, discussion with patient/family and/or coordination of care.
direct pt care and IDR / Chart and Imaging and EKG reviewed
Time above includes time spent preparing to see the patient, obtaining and/or reviewing separately obtained history, performing a medically appropriate examination and/or evaluation, counseling and educating the patient/family/caregiver, referring and communicating with other health care professionals (when not separately reported), documenting clinical information in the electronic or other health record, independently interpreting results (not separately reported) and communicating results to the patient/family/caregiver, and/or care coordination (not separately reported).

## 2025-05-12 NOTE — PROGRESS NOTE ADULT - PROVIDER SPECIALTY LIST ADULT
Internal Medicine
Hospitalist
Internal Medicine
Urology
Urology
Hospitalist
Internal Medicine
Neuropsychology
Urology
Hospitalist
Internal Medicine
Palliative Care

## 2025-05-12 NOTE — PROGRESS NOTE ADULT - REASON FOR ADMISSION
hematuria

## 2025-05-12 NOTE — PROGRESS NOTE ADULT - ASSESSMENT
89 yo M pmhx htn presenting to the ED for evaluation. pt son at bedside endorsing he has been more confused and speaking "gibberish". pt fell 2 weeks ago, diagnosed with 4 Right sided rib fractures outpatient. Pt also with urinary retention. Pt had anaya placed in the ER on admission and he removed it and it was replaced again and then he removed again. Now with some light hematuria.   Psychiatry consulted to assist with medication management for Dementia with Agitation vs Delirium as QTc > 500 and can not use usual antipsychotics. On  pt endorsed some chest pain so transferred to [address] [place] [address], found to have acute PE.     #New onset HFrEF (2025 TTE EF 30-35%)  #New hypoxic respiratory failure   #Acute PE  - 25 TTE: Global LV systolic function was moderately decreased. Left ventricular ejection fraction, by visual estimation, is 30 to 35%.   - started GDMT --> introduced lisinopril 10 mg qd and metoprolol succ 25 mg qd   - discontinued nifedipine  - not a candidate for LifeVest or AICD due to age, dementia and poor functional status   - case discussed with cardio fellow. patient is not a candidate for ischemic work up. TSH = wnl, thiamine level ordered. Consider official cardio consult if any other questions.  - CXR stable -- got one dose of lasix 5/3--   - increased O2 reqs and desats even on HF. Made CMO    GOC:  d/w son who confirmed DNR/DNI and wants pt comfortable. Son agreed to CMO. Started Ativan and Morphine.     #Code status: DNR/DNI/CMO    Pt  later in the day.    My note supersedes the residents note should a discrepancy arise.    Chart and notes personally reviewed.  Care Discussed with Consultants/Other Providers/ Housestaff [ x] YES [ ] NO   Radiology, labs, old records personally reviewed.    discussed w/ housestaff, nursing, case management, son, DIL    Time-based billing (NON-critical care).     35 minutes spent on total encounter. The necessity of the time spent during the encounter on this date of service was due to:     time spent on review of labs, imaging studies, old records, obtaining history, personally examining patient, counselling and communicating with patient/ family, entering orders for medications/tests/etc, discussions with other health care providers, documentation in electronic health records, independent interpretation of labs, imaging/procedure results and care coordination.

## 2025-05-12 NOTE — DISCHARGE NOTE FOR THE EXPIRED PATIENT - HOSPITAL COURSE
89 year Male with PMHx of HTN presenting to the ED for evaluation. pt son at bedside endorsing he has been more confused and speaking "gibberish". pt fell 2 weeks ago, diagnosed with 4 Right sided rib fractures outpatient. Pt also with urinary retention. Pt had anaya placed in the ER on admission and he removed it and it was replaced again and then he removed again. Now with some light hematuria. On 4/23 pt endorsed some chest pain, found to have acute PE. TTE showed Global LV systolic function was moderately decreased. Left ventricular ejection fraction, by visual estimation, is 30 to 35%. Was started on GDMT. Cardio stated not a candidate for LifeVest or AICD due to age, dementia and poor functional status. Patient declined and had worsening PO intake. Family did not want feeding tubes placed so decision was made to make patient CMO.  89 year Male with PMHx of HTN presenting to the ED for evaluation. pt son at bedside endorsing he has been more confused and speaking "gibberish". pt fell 2 weeks ago, diagnosed with 4 Right sided rib fractures outpatient. Pt also with urinary retention. Pt had anaya placed in the ER on admission and he removed it and it was replaced again and then he removed again. Now with some light hematuria. On  pt endorsed some chest pain, found to have acute PE. TTE showed Global LV systolic function was moderately decreased. Left ventricular ejection fraction, by visual estimation, is 30 to 35%. Was started on GDMT. Cardio stated not a candidate for LifeVest or AICD due to age, dementia and poor functional status. Patient declined and had worsening PO intake. Family did not want feeding tubes placed so decision was made to make patient CMO. Patient  due to cardiopulmonary arrest. 89 year Male with PMHx of HTN, hearing loss, Dementia baseline AOx1 who presented to the ED for evaluation. Patient son at bedside endorsing he has been more confused and speaking "gibberish". Patient fell 2 weeks ago, diagnosed with 4 Right sided rib fractures outpatient. Pt also with urinary retention. Pt had anaya placed in the ER on admission and he removed it and it was replaced again and then he removed again. Now with some light hematuria. On  pt endorsed some chest pain, found to have acute PE. TTE showed Global LV systolic function was moderately decreased. Left ventricular ejection fraction, by visual estimation, is 30 to 35%. Was started on GDMT. Cardio stated not a candidate for LifeVest or AICD due to age, dementia and poor functional status. Patient declined and had worsening PO intake. Family did not want feeding tubes placed so decision was made to make patient CMO. Patient  due to cardiopulmonary arrest. 89 year Male with PMHx of HTN, hearing loss, Dementia baseline AOx1 who presented to the ED for evaluation. Patient son at bedside endorsing he has been more confused and speaking "gibberish". Patient fell 2 weeks ago, diagnosed with 4 Right sided rib fractures outpatient. Pt also with urinary retention. Pt had anaya placed in the ER on admission and he removed it and it was replaced again and then he removed again. Now with some light hematuria. On  pt endorsed some chest pain, found to have acute PE. TTE showed Global LV systolic function was moderately decreased. Left ventricular ejection fraction, by visual estimation, is 30 to 35%. Was started on GDMT. Cardio stated not a candidate for LifeVest or AICD due to age, dementia and poor functional status. Patient declined and had worsening PO intake. Family did not want feeding tubes placed, so decision was made to make patient CMO. Patient  due to cardiopulmonary arrest.

## 2025-05-12 NOTE — PROGRESS NOTE ADULT - ASSESSMENT
89 yo M pmhx htn presenting to the ED for evaluation. pt son at bedside endorsing he has been more confused and speaking "gibberish".  Patient with new onset HFrEF, new hypoxic respiratory failure, acute PE, gross hematuria. Patent's family decided on CMO earlier today.  Palliative care consulted for GOC and symptom management.    Patient seen at bedside with family. He was agonally breathing and actively dying.  All questions from family answered.    Plan  -DNR/DNI  -Comfort measures  -No additional IVF, artificial nutrition, blood draws, vital signs, pressors, antibiotics, escalation of oxygen, escalation of care  -Comfort feeds OK  -morphine 4mg IV q6h ATC  -morphine 4mg IV q1h PRN for pain/dyspnea  -lorazepam 1mg IV q4h PRN for anxiety/agitation  -glycopyrrolate 0.2mg IV q6h ATC  -will follow      Please call [phone] with questions or concerns 24/7.    N/A

## 2025-05-12 NOTE — PROGRESS NOTE ADULT - SUBJECTIVE AND OBJECTIVE BOX
Patient is a 89y old  Male who presents with a chief complaint of hematuria (05 May 2025 06:55)    INTERVAL HPI/OVERNIGHT EVENTS: Patient was examined and seen at bedside. This morning pt is comfortably resting in bed and staff report no events.     InitialHPI:   89 yo M pmhx htn presenting to the ED for evaluation. pt son at bedside endorsing he has been more confused and speaking "gibberish". pt fell 2 weeks ago, diagnosed with 4 R sided rib fractures outpatient. pt also with urinary retention, seen in the ED earlier today and had anaya placed. Denies fever, chills, abd pain, chest pain, calf pain, nausea, vomiting, headache.      · BP Systolic	125 mm Hg  · BP Diastolic	73 mm Hg  · Heart Rate	 101 /min  · Respiration Rate (breaths/min)	18 /min  · Temp (F)	97.7 Degrees F  · Temp (C)	36.5 Degrees C  · Temp site	oral  · SpO2 (%)	96 %  · O2 Delivery/Oxygen Delivery Method	room air  · Temp at ED Arrival (C)	36.5 Degrees C    Labs: Hb 15.2> 13.2,  Troponin 48>40, UA : bloody (traumatic )     CT head:    Bones are diffusely osteopenic.    No evidence of acute compression deformity or facet subluxation.    Dens is intact. No thickening of the prevertebral soft tissues.    Multilevel degenerative changes.    Small scattered slightly ill-defined lucencies throughout the vertebral   column. For example posterior aspect of the dens left aspect of C2.    Heterogeneous left thyroid lobe goiter, partially imaged       (19 Apr 2025 03:48)    PAST MEDICAL & SURGICAL HISTORY:  Hypertension          General: obtunded  CV: S1 S2  Resp: decreased breath sounds at bases  GI: NT/ND/S +BS  MS: no clubbing/cyanosis/edema, + pulses b/l  Neuro: unable to access          Home Medications:  brimonidine 0.2% ophthalmic solution: 1 drop(s) to each affected eye 2 times a day (21 Apr 2025 09:54)  enoxaparin: 40 milligram(s) subcutaneous once a day (21 Apr 2025 09:54)  latanoprost 0.005% ophthalmic solution: 1 drop(s) to each affected eye once a day (at bedtime) (21 Apr 2025 09:54)  NIFEdipine 30 mg oral tablet, extended release: 1 tab(s) orally once a day (21 Apr 2025 09:54)  tamsulosin 0.4 mg oral capsule: 1 cap(s) orally once a day (at bedtime) (21 Apr 2025 09:54)  timolol maleate 0.5% ophthalmic solution: 1 drop(s) to each affected eye 2 times a day (21 Apr 2025 09:54)    MEDICATIONS  (STANDING):  chlorhexidine 2% Cloths 1 Application(s) Topical daily  divalproex  milliGRAM(s) Oral <User Schedule>  divalproex  milliGRAM(s) Oral <User Schedule>  glycopyrrolate Injectable 0.2 milliGRAM(s) IV Push every 6 hours  morphine  - Injectable 4 milliGRAM(s) IV Push every 6 hours    MEDICATIONS  (PRN):  LORazepam   Injectable 1 milliGRAM(s) IV Push every 4 hours PRN agitation/anxiety  morphine  - Injectable 2 milliGRAM(s) IV Push every 1 hour PRN agitation, air hunger    Vital Signs Last 24 Hrs  T(C): --  T(F): --  HR: --  BP: --  BP(mean): --  RR: --  SpO2: --      CAPILLARY BLOOD GLUCOSE        LABS:                            Consultant Notes Reviewed:  [x ] YES  [ ] NO  Care Discussed with Consultants/Other Providers/ Housestaff [ x] YES  [ ] NO  Radiology, labs, EKGs, new studies personally reviewed.

## 2025-05-12 NOTE — PROGRESS NOTE ADULT - SUBJECTIVE AND OBJECTIVE BOX
HPI:   89 yo M pmhx htn presenting to the ED for evaluation. pt son at bedside endorsing he has been more confused and speaking "gibberish". pt fell 2 weeks ago, diagnosed with 4 R sided rib fractures outpatient. pt also with urinary retention, seen in the ED earlier today and had anaya placed. Denies fever, chills, abd pain, chest pain, calf pain, nausea, vomiting, headache.    Interval history  -Patient seen at bedside with family  -He was agonally breathing and unable to participate in exam  -All questions answered     ADVANCE DIRECTIVES:     [ ] Full Code [ x] DNR  MOLST  [ ]  Living Will  [ ]   DECISION MAKER(s):  [ ] Health Care Proxy(s)  [x ] Surrogate(s)  [ ] Guardian           Name(s): Phone Number(s):  Children      BASELINE (I)ADL(s) (prior to admission):    [place]: [ ]Total  [ ] Moderate [ ]Dependent  Palliative Performance Status Version 2:         %    http://npcrc.org/files/news/palliative_performance_scale_[email or web address].pdf    Allergies    No Known Allergies    Intolerances    MEDICATIONS  (STANDING):  chlorhexidine 2% Cloths 1 Application(s) Topical daily  divalproex  milliGRAM(s) Oral <User Schedule>  divalproex  milliGRAM(s) Oral <User Schedule>  glycopyrrolate Injectable 0.2 milliGRAM(s) IV Push every 6 hours  morphine  - Injectable 4 milliGRAM(s) IV Push every 6 hours    MEDICATIONS  (PRN):  LORazepam   Injectable 1 milliGRAM(s) IV Push every 4 hours PRN agitation/anxiety  morphine  - Injectable 2 milliGRAM(s) IV Push every 1 hour PRN agitation, air hunger      PRESENT SYMPTOMS: [x ]Unable to obtain due to poor mentation   Source if other than patient:  [ ]Family   [ ]Team     Pain: [ ]yes [ ]no  ALL PAIN ASSESSMENT COMPONENTS WERE ASKED ABOUT UNLESS OTHERWISE NOTED  QOL impact -   Location -                    Aggravating factors -  Quality -  Radiation -  Timing-  Severity (0-10 scale):  Minimal acceptable level (0-10 scale):     CPOT:  0  https://www.scc.org/getattachment/mdu03n04-8k1r-9u4b-9g7g-4234d3469n4n/Critical-Care-Pain-Observation-Tool-(CPOT)    PAIN AD Score:   http://[email or web address]/painad.pdf (press ctrl +  left click to view)    Dyspnea:                           [ ]None[ ]Mild [ ]Moderate [ ]Severe     Respiratory Distress Observation Scale (RDOS): 0  A score of 0 to 2 signifies little or no respiratory distress, 3 signifies mild distress, scores 4 to 6 indicate moderate distress, and scores greater than 7 signify severe distress  [email or web address]/sites/default/files/PDFS/930811-wkqilnyfptz-oicskvee-gxxihilizhq-dmbir.pdf    Anxiety:                             [ ]None[ ]Mild [ ]Moderate [ ]Severe   Fatigue:                             [ ]None[ ]Mild [ ]Moderate [ ]Severe   Nausea:                             [ ]None[ ]Mild [ ]Moderate [ ]Severe   Loss of appetite:              [ ]None[ ]Mild [ ]Moderate [ ]Severe   Constipation:                    [ ]None[ ]Mild [ ]Moderate [ ]Severe    Other Symptoms:  [ ]All other review of systems negative     Palliative Performance Status Version 2:        10 %    [email or web address]/news/palliative_performance_scale_[email or web address].pdf    PHYSICAL EXAM:  Vital Signs Last 24 Hrs  T(C): --  T(F): --  HR: --  BP: --  BP(mean): --  RR: --  SpO2: --          GENERAL:  [ ] No acute distress [x ]Lethargic  [x ]Unarousable  [ ]Verbal  [ ]Non-Verbal [ ]Cachexia    BEHAVIORAL/PSYCH:  [ ]Alert and Oriented x  [ ] Anxiety [ ] Delirium [ ] Agitation [ x] Calm   EYES: [ ] No scleral icterus [ ] Scleral icterus [ ] Closed  ENMT:  [ ]Dry mouth  [x ]No external oral lesions [ ] No external ear or nose lesions  CARDIOVASCULAR:  [ ]Regular [ ]Irregular [ ]Tachy [ ]Not Tachy  [ ]Braydon [ ] Edema [ ] No edema  PULMONARY:  [ ]Tachypnea  [ ]Audible excessive secretions [ ] No labored breathing [ ] labored breathing [x]agonal  GASTROINTESTINAL: [ ]Soft  [ ]Distended  [ ]Not distended [ ]Non tender [ ]Tender  MUSCULOSKELETAL: [ ]No clubbing [ ] clubbing  [ ] No cyanosis [ ] cyanosis  NEUROLOGIC: [ ]No focal deficits  [ ]Follows commands  [ x]Does not follow commands  [ x]Cognitive impairment  [ ]Dysphagia  [ ]Dysarthria  [ ]Paresis   SKIN: [ ] Jaundiced [ x] Non-jaundiced [ ]Rash [ ]No Rash [ ] Warm [ ] Dry  MISC/LINES: [ ] ET tube [ ] Trach [ ]NGT/OGT [ ]PEG [ ]Anaya    LABS: Interpreted by merline GERBER from 5/7 shows sodium 148, CBC shows leukocytosis, LFTs grossly within normal limites        RADIOLOGY & ADDITIONAL STUDIES: Interpreted by me - WNL    < from: Xray Chest 1 View- PORTABLE-Urgent (Xray Chest 1 View- PORTABLE-Urgent .) (05.09.25 @ 10:37) >  IMPRESSION:    No radiographic evidence of acute cardiopulmonary disease.    < end of copied text >      EKG: Interpreted by me - RBBHANU WATTSR    < from: 12 Lead ECG (04.28.25 @ 10:40) >    Ventricular Rate 79 BPM    Atrial Rate 79 BPM    P-R Interval 176 ms    QRS Duration 166 ms    Q-T Interval 460 ms    QTC Calculation(Bazett) 527 ms    P Axis 59 degrees    R Axis -71 degrees    T Axis 59 degrees    Diagnosis Line Normal sinus rhythm  Right bundle branch block  Left anterior fascicular block  *** Bifascicular block ***  Abnormal ECG    < end of copied text >      PROTEIN CALORIE MALNUTRITION PRESENT: [ ]mild [ ]moderate [ ]severe [ ]underweight [ ]morbid obesity  https://www.andeal.org/vault/2440/web/files/ONC/Table_Clinical%20Characteristics%20to%20Document%20Malnutrition-White%20JV%20et%20al%710021.pdf    Height (cm): 167.6 (04-29-25 @ 16:05)  Weight (kg): 79.4 (04-19-25 @ 02:51)  BMI (kg/m2): 28.3 (04-29-25 @ 16:05)  [ ]PPSV2 < or = to 30% [ ]significant weight loss  [ ]poor nutritional intake  [ ]anasarca      [ ]Artificial Nutrition      Palliative Care Spiritual/Emotional Screening Tool Question  Severity (0-4):                    OR                    [ x] Unable to determine. Will assess at later time if appropriate.  Score of 2 or greater indicates recommendation of Chaplaincy and/or SW referral  Chaplaincy Referral: [ ] Yes [ ] Refused [ ] Following     Caregiver [place]:  [ ] Yes [ ] No    OR    [x ] Unable to determine. Will assess at later time if appropriate.  Social Work Referral [ ]  Patient and Family Centered Care Referral [ ]    Anticipatory Grief Present: [ ] Yes [ ] No    OR     [ x] Unable to determine. Will assess at later time if appropriate.  Social Work Referral [ ]  Patient and Family Centered Care Referral [ ]    Patient discussed with primary medical team MD  Palliative care education provided to patient and/or family

## 2025-05-19 DIAGNOSIS — R29.6 REPEATED FALLS: ICD-10-CM

## 2025-05-19 DIAGNOSIS — J96.91 RESPIRATORY FAILURE, UNSPECIFIED WITH HYPOXIA: ICD-10-CM

## 2025-05-19 DIAGNOSIS — Z78.1 PHYSICAL RESTRAINT STATUS: ICD-10-CM

## 2025-05-19 DIAGNOSIS — F05 DELIRIUM DUE TO KNOWN PHYSIOLOGICAL CONDITION: ICD-10-CM

## 2025-05-19 DIAGNOSIS — H91.90 UNSPECIFIED HEARING LOSS, UNSPECIFIED EAR: ICD-10-CM

## 2025-05-19 DIAGNOSIS — R33.9 RETENTION OF URINE, UNSPECIFIED: ICD-10-CM

## 2025-05-19 DIAGNOSIS — R74.01 ELEVATION OF LEVELS OF LIVER TRANSAMINASE LEVELS: ICD-10-CM

## 2025-05-19 DIAGNOSIS — E43 UNSPECIFIED SEVERE PROTEIN-CALORIE MALNUTRITION: ICD-10-CM

## 2025-05-19 DIAGNOSIS — I26.99 OTHER PULMONARY EMBOLISM WITHOUT ACUTE COR PULMONALE: ICD-10-CM

## 2025-05-19 DIAGNOSIS — R94.31 ABNORMAL ELECTROCARDIOGRAM [ECG] [EKG]: ICD-10-CM

## 2025-05-19 DIAGNOSIS — I50.20 UNSPECIFIED SYSTOLIC (CONGESTIVE) HEART FAILURE: ICD-10-CM

## 2025-05-19 DIAGNOSIS — I11.0 HYPERTENSIVE HEART DISEASE WITH HEART FAILURE: ICD-10-CM

## 2025-05-19 DIAGNOSIS — S37.30XA UNSPECIFIED INJURY OF URETHRA, INITIAL ENCOUNTER: ICD-10-CM

## 2025-05-19 DIAGNOSIS — E87.3 ALKALOSIS: ICD-10-CM

## 2025-05-19 DIAGNOSIS — R50.9 FEVER, UNSPECIFIED: ICD-10-CM
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
## 2025-05-19 DIAGNOSIS — K59.00 CONSTIPATION, UNSPECIFIED: ICD-10-CM

## 2025-05-19 DIAGNOSIS — I47.20 VENTRICULAR TACHYCARDIA, UNSPECIFIED: ICD-10-CM

## 2025-05-19 DIAGNOSIS — R47.1 DYSARTHRIA AND ANARTHRIA: ICD-10-CM

## 2025-05-19 DIAGNOSIS — Z51.5 ENCOUNTER FOR PALLIATIVE CARE: ICD-10-CM

## 2025-05-19 DIAGNOSIS — Z66 DO NOT RESUSCITATE: ICD-10-CM

## 2025-05-19 DIAGNOSIS — F03.C11 UNSPECIFIED DEMENTIA, SEVERE, WITH AGITATION: ICD-10-CM
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
## 2025-05-19 DIAGNOSIS — I45.10 UNSPECIFIED RIGHT BUNDLE-BRANCH BLOCK: ICD-10-CM

## 2025-06-04 ENCOUNTER — APPOINTMENT (OUTPATIENT)
Dept: CARDIOLOGY | Facility: CLINIC | Age: 89
End: 2025-06-04